# Patient Record
Sex: FEMALE | Race: WHITE | NOT HISPANIC OR LATINO | Employment: OTHER | URBAN - METROPOLITAN AREA
[De-identification: names, ages, dates, MRNs, and addresses within clinical notes are randomized per-mention and may not be internally consistent; named-entity substitution may affect disease eponyms.]

---

## 2023-04-01 ENCOUNTER — APPOINTMENT (OUTPATIENT)
Dept: RADIOLOGY | Facility: MEDICAL CENTER | Age: 33
End: 2023-04-01
Attending: EMERGENCY MEDICINE
Payer: MEDICAID

## 2023-04-01 ENCOUNTER — HOSPITAL ENCOUNTER (EMERGENCY)
Facility: MEDICAL CENTER | Age: 33
End: 2023-04-01
Attending: EMERGENCY MEDICINE
Payer: MEDICAID

## 2023-04-01 VITALS
WEIGHT: 115 LBS | DIASTOLIC BLOOD PRESSURE: 88 MMHG | TEMPERATURE: 99 F | SYSTOLIC BLOOD PRESSURE: 136 MMHG | OXYGEN SATURATION: 100 % | HEART RATE: 90 BPM | RESPIRATION RATE: 15 BRPM

## 2023-04-01 DIAGNOSIS — H53.9 VISUAL DISTURBANCE: ICD-10-CM

## 2023-04-01 DIAGNOSIS — R20.0 NUMBNESS: ICD-10-CM

## 2023-04-01 LAB
ABO GROUP BLD: NORMAL
ALBUMIN SERPL BCP-MCNC: 4.3 G/DL (ref 3.2–4.9)
ALBUMIN/GLOB SERPL: 1.3 G/DL
ALP SERPL-CCNC: 41 U/L (ref 30–99)
ALT SERPL-CCNC: 13 U/L (ref 2–50)
ANION GAP SERPL CALC-SCNC: 15 MMOL/L (ref 7–16)
APTT PPP: 25.5 SEC (ref 24.7–36)
AST SERPL-CCNC: 19 U/L (ref 12–45)
BASOPHILS # BLD AUTO: 0.7 % (ref 0–1.8)
BASOPHILS # BLD: 0.07 K/UL (ref 0–0.12)
BILIRUB SERPL-MCNC: 0.3 MG/DL (ref 0.1–1.5)
BLD GP AB SCN SERPL QL: NORMAL
BUN SERPL-MCNC: 10 MG/DL (ref 8–22)
CALCIUM ALBUM COR SERPL-MCNC: 9.1 MG/DL (ref 8.5–10.5)
CALCIUM SERPL-MCNC: 9.3 MG/DL (ref 8.5–10.5)
CHLORIDE SERPL-SCNC: 105 MMOL/L (ref 96–112)
CO2 SERPL-SCNC: 20 MMOL/L (ref 20–33)
CREAT SERPL-MCNC: 0.71 MG/DL (ref 0.5–1.4)
EKG IMPRESSION: NORMAL
EOSINOPHIL # BLD AUTO: 0.12 K/UL (ref 0–0.51)
EOSINOPHIL NFR BLD: 1.2 % (ref 0–6.9)
ERYTHROCYTE [DISTWIDTH] IN BLOOD BY AUTOMATED COUNT: 43.3 FL (ref 35.9–50)
GFR SERPLBLD CREATININE-BSD FMLA CKD-EPI: 113 ML/MIN/1.73 M 2
GLOBULIN SER CALC-MCNC: 3.4 G/DL (ref 1.9–3.5)
GLUCOSE SERPL-MCNC: 87 MG/DL (ref 65–99)
HCG SERPL QL: NEGATIVE
HCT VFR BLD AUTO: 43.3 % (ref 37–47)
HGB BLD-MCNC: 14.8 G/DL (ref 12–16)
IMM GRANULOCYTES # BLD AUTO: 0.03 K/UL (ref 0–0.11)
IMM GRANULOCYTES NFR BLD AUTO: 0.3 % (ref 0–0.9)
INR PPP: 1 (ref 0.87–1.13)
LYMPHOCYTES # BLD AUTO: 2.28 K/UL (ref 1–4.8)
LYMPHOCYTES NFR BLD: 23.4 % (ref 22–41)
MCH RBC QN AUTO: 31.9 PG (ref 27–33)
MCHC RBC AUTO-ENTMCNC: 34.2 G/DL (ref 33.6–35)
MCV RBC AUTO: 93.3 FL (ref 81.4–97.8)
MONOCYTES # BLD AUTO: 0.51 K/UL (ref 0–0.85)
MONOCYTES NFR BLD AUTO: 5.2 % (ref 0–13.4)
NEUTROPHILS # BLD AUTO: 6.74 K/UL (ref 2–7.15)
NEUTROPHILS NFR BLD: 69.2 % (ref 44–72)
NRBC # BLD AUTO: 0 K/UL
NRBC BLD-RTO: 0 /100 WBC
PLATELET # BLD AUTO: 236 K/UL (ref 164–446)
PMV BLD AUTO: 11.2 FL (ref 9–12.9)
POTASSIUM SERPL-SCNC: 3.9 MMOL/L (ref 3.6–5.5)
PROT SERPL-MCNC: 7.7 G/DL (ref 6–8.2)
PROTHROMBIN TIME: 13.1 SEC (ref 12–14.6)
RBC # BLD AUTO: 4.64 M/UL (ref 4.2–5.4)
RH BLD: NORMAL
SODIUM SERPL-SCNC: 140 MMOL/L (ref 135–145)
TROPONIN T SERPL-MCNC: <6 NG/L (ref 6–19)
WBC # BLD AUTO: 9.8 K/UL (ref 4.8–10.8)

## 2023-04-01 PROCEDURE — 86900 BLOOD TYPING SEROLOGIC ABO: CPT

## 2023-04-01 PROCEDURE — 700105 HCHG RX REV CODE 258: Performed by: EMERGENCY MEDICINE

## 2023-04-01 PROCEDURE — 85730 THROMBOPLASTIN TIME PARTIAL: CPT

## 2023-04-01 PROCEDURE — 99285 EMERGENCY DEPT VISIT HI MDM: CPT

## 2023-04-01 PROCEDURE — 71045 X-RAY EXAM CHEST 1 VIEW: CPT

## 2023-04-01 PROCEDURE — 84703 CHORIONIC GONADOTROPIN ASSAY: CPT

## 2023-04-01 PROCEDURE — 84484 ASSAY OF TROPONIN QUANT: CPT

## 2023-04-01 PROCEDURE — 86901 BLOOD TYPING SEROLOGIC RH(D): CPT

## 2023-04-01 PROCEDURE — 70496 CT ANGIOGRAPHY HEAD: CPT

## 2023-04-01 PROCEDURE — 36415 COLL VENOUS BLD VENIPUNCTURE: CPT

## 2023-04-01 PROCEDURE — 0042T CT-CEREBRAL PERFUSION ANALYSIS: CPT

## 2023-04-01 PROCEDURE — 80053 COMPREHEN METABOLIC PANEL: CPT

## 2023-04-01 PROCEDURE — 85025 COMPLETE CBC W/AUTO DIFF WBC: CPT

## 2023-04-01 PROCEDURE — 85610 PROTHROMBIN TIME: CPT

## 2023-04-01 PROCEDURE — 94760 N-INVAS EAR/PLS OXIMETRY 1: CPT

## 2023-04-01 PROCEDURE — 700111 HCHG RX REV CODE 636 W/ 250 OVERRIDE (IP): Performed by: EMERGENCY MEDICINE

## 2023-04-01 PROCEDURE — 96365 THER/PROPH/DIAG IV INF INIT: CPT | Mod: XU

## 2023-04-01 PROCEDURE — 70450 CT HEAD/BRAIN W/O DYE: CPT

## 2023-04-01 PROCEDURE — 70498 CT ANGIOGRAPHY NECK: CPT

## 2023-04-01 PROCEDURE — 96366 THER/PROPH/DIAG IV INF ADDON: CPT

## 2023-04-01 PROCEDURE — 700117 HCHG RX CONTRAST REV CODE 255

## 2023-04-01 PROCEDURE — 96375 TX/PRO/DX INJ NEW DRUG ADDON: CPT | Mod: XU

## 2023-04-01 PROCEDURE — 86850 RBC ANTIBODY SCREEN: CPT

## 2023-04-01 PROCEDURE — 93005 ELECTROCARDIOGRAM TRACING: CPT

## 2023-04-01 RX ORDER — KETOROLAC TROMETHAMINE 30 MG/ML
15 INJECTION, SOLUTION INTRAMUSCULAR; INTRAVENOUS ONCE
Status: COMPLETED | OUTPATIENT
Start: 2023-04-01 | End: 2023-04-01

## 2023-04-01 RX ORDER — MAGNESIUM SULFATE HEPTAHYDRATE 40 MG/ML
2 INJECTION, SOLUTION INTRAVENOUS ONCE
Status: COMPLETED | OUTPATIENT
Start: 2023-04-01 | End: 2023-04-01

## 2023-04-01 RX ORDER — SODIUM CHLORIDE 9 MG/ML
1000 INJECTION, SOLUTION INTRAVENOUS ONCE
Status: COMPLETED | OUTPATIENT
Start: 2023-04-01 | End: 2023-04-01

## 2023-04-01 RX ADMIN — IOHEXOL 80 ML: 350 INJECTION, SOLUTION INTRAVENOUS at 16:47

## 2023-04-01 RX ADMIN — IOHEXOL 40 ML: 350 INJECTION, SOLUTION INTRAVENOUS at 16:40

## 2023-04-01 RX ADMIN — MAGNESIUM SULFATE HEPTAHYDRATE 2 G: 40 INJECTION, SOLUTION INTRAVENOUS at 17:37

## 2023-04-01 RX ADMIN — KETOROLAC TROMETHAMINE 15 MG: 30 INJECTION, SOLUTION INTRAMUSCULAR at 19:00

## 2023-04-01 RX ADMIN — SODIUM CHLORIDE 1000 ML: 9 INJECTION, SOLUTION INTRAVENOUS at 17:32

## 2023-04-01 NOTE — ED TRIAGE NOTES
Pt to triage .  Chief Complaint   Patient presents with    Visual Problems     Visual changes right eye     Numbness     Pt c/o numbness to bilateral hands     Other     Difficulty speaking since 2 pm

## 2023-04-01 NOTE — ED PROVIDER NOTES
ER Provider Note    Scribed for Shira Ma M.d. by Johnathan Myrick. 4/1/2023  4:02 PM    Primary Care Provider: No primary care provider noted.    CHIEF COMPLAINT  Chief Complaint   Patient presents with    Visual Problems     Visual changes right eye     Numbness     Pt c/o numbness to bilateral hands     Other     Difficulty speaking since 2 pm      EXTERNAL RECORDS REVIEWED  Outpatient Notes reviewed outpatient note from January 22.  Reports patient has a history of recurrent UTIs.  Reviewed notes from 2018 from a neurology office in [place].  There is a diagnosis of migraine with aura.  She has a history of myofascial pain as well she did physical therapy for pain in her neck.  She had daily headaches at that time.  She was on magnesium for migraine prophylaxis.  Note from 2017 from neurology.  In this note she was referred to neurology for a transient neurological episode.  The prior November she had crystallize colors in her right eye and then lost half of her vision in her right eye.  Then she had numbness in her arm and tingling in her hand and shoulder.  She had difficulties with her speech at that time as well and was aware of this issue.  Tonight hours after this episode she had significant pounding in her head which lasted multiple hours.  The arm numbness lasted 6 hours.  She had associated neck pain at that time as well.  At that time they were concerned for cervical radiculopathy and referred her to physical therapy.  She did have MRIs of her C-spine and brain at that time that showed some minimal central disc protrusion without stenosis at C4 5-6 7.  MRI of the brain showed sinusitis otherwise normal.  EMG was normal.    HPI/ROS  LIMITATION TO HISTORY   Select: : None  OUTSIDE HISTORIAN(S):  Significant other boyfriend at bedside to confirm sequence of events and collateral information as detailed above    Aminata Lorenzana is a 33 y.o. female with a history of stroke in 2017 who presents to the ED  as a Stroke Assessment complaining of for evaluation of resolved right sided visual changes onset 1400 today. The patient states she also has bilateral hand numbness, left sided neck pain, left sided jaw pain, nausea, and difficulty speaking, but denies any vomiting, or changes in sensation to her legs. She notes that her symptoms began with seeing black in her right eye. Her boyfriend attempted to assess her pupils, and notes that her right eye was unresponsive to light for a short time. She was slow to respond to questions of orientation, but was able to answer all of the questions. Her visual changes have now resolved. She states that her previous stroke, which occurred in [place], affected her left side. She is unsure if her left sided symptoms are new or chronic. She has a distant history of migraines, but has not had any for a few years. Per nursing, she had equal  strength in triage, but was brought back as a code stroke due to her visual changes and difficulty speaking. No alleviating or exacerbating factors were noted. Her only daily medications include birth control. She has no known drug allergies. She denies any recent illness.    PAST MEDICAL HISTORY  Past Medical History:   Diagnosis Date    Stroke (HCC)      SURGICAL HISTORY  History reviewed. No pertinent surgical history.    FAMILY HISTORY  History reviewed. No pertinent family history.    SOCIAL HISTORY   reports that she has never smoked. She has never used smokeless tobacco. She reports current alcohol use. She reports that she does not use drugs.    CURRENT MEDICATIONS  No current outpatient medications    ALLERGIES  Patient has no known allergies.    PHYSICAL EXAM  BP (!) 142/103   Pulse (!) 104   Temp 36.8 °C (98.2 °F) (Temporal)   Resp 16   Wt 52.2 kg (115 lb)   LMP 01/27/2023 (Approximate) Comment: irregular due to birth control  SpO2 100%   Constitutional: Alert in no apparent distress.  HENT: No signs of trauma, Bilateral  external ears normal, Nose normal.   Eyes: Pupils are equal and reactive, Conjunctiva normal, Non-icteric.   Neck:  No stridor.   Cardiovascular: Regular rate and rhythm, no murmurs.   Thorax & Lungs: Normal breath sounds, No respiratory distress, No wheezing, No chest tenderness.   Abdomen: Bowel sounds normal, Soft, No tenderness, No masses, No peritoneal signs.  Skin: Warm, Dry, No erythema, No rash.   Musculoskeletal:  No major deformities noted.   Neurologic: Alert, cranial nerves intact strength in all 4 extremities intact patient reports altered sensation of her bilateral hands.  Her NIH stroke scale is 1.    DIAGNOSTIC STUDIES    Labs:   Results for orders placed or performed during the hospital encounter of 04/01/23   CBC WITH DIFFERENTIAL   Result Value Ref Range    WBC 9.8 4.8 - 10.8 K/uL    RBC 4.64 4.20 - 5.40 M/uL    Hemoglobin 14.8 12.0 - 16.0 g/dL    Hematocrit 43.3 37.0 - 47.0 %    MCV 93.3 81.4 - 97.8 fL    MCH 31.9 27.0 - 33.0 pg    MCHC 34.2 33.6 - 35.0 g/dL    RDW 43.3 35.9 - 50.0 fL    Platelet Count 236 164 - 446 K/uL    MPV 11.2 9.0 - 12.9 fL    Neutrophils-Polys 69.20 44.00 - 72.00 %    Lymphocytes 23.40 22.00 - 41.00 %    Monocytes 5.20 0.00 - 13.40 %    Eosinophils 1.20 0.00 - 6.90 %    Basophils 0.70 0.00 - 1.80 %    Immature Granulocytes 0.30 0.00 - 0.90 %    Nucleated RBC 0.00 /100 WBC    Neutrophils (Absolute) 6.74 2.00 - 7.15 K/uL    Lymphs (Absolute) 2.28 1.00 - 4.80 K/uL    Monos (Absolute) 0.51 0.00 - 0.85 K/uL    Eos (Absolute) 0.12 0.00 - 0.51 K/uL    Baso (Absolute) 0.07 0.00 - 0.12 K/uL    Immature Granulocytes (abs) 0.03 0.00 - 0.11 K/uL    NRBC (Absolute) 0.00 K/uL   COMP METABOLIC PANEL   Result Value Ref Range    Sodium 140 135 - 145 mmol/L    Potassium 3.9 3.6 - 5.5 mmol/L    Chloride 105 96 - 112 mmol/L    Co2 20 20 - 33 mmol/L    Anion Gap 15.0 7.0 - 16.0    Glucose 87 65 - 99 mg/dL    Bun 10 8 - 22 mg/dL    Creatinine 0.71 0.50 - 1.40 mg/dL    Calcium 9.3 8.5 - 10.5  mg/dL    AST(SGOT) 19 12 - 45 U/L    ALT(SGPT) 13 2 - 50 U/L    Alkaline Phosphatase 41 30 - 99 U/L    Total Bilirubin 0.3 0.1 - 1.5 mg/dL    Albumin 4.3 3.2 - 4.9 g/dL    Total Protein 7.7 6.0 - 8.2 g/dL    Globulin 3.4 1.9 - 3.5 g/dL    A-G Ratio 1.3 g/dL   PROTHROMBIN TIME   Result Value Ref Range    PT 13.1 12.0 - 14.6 sec    INR 1.00 0.87 - 1.13   APTT   Result Value Ref Range    APTT 25.5 24.7 - 36.0 sec   COD (ADULT)   Result Value Ref Range    ABO Grouping Only A     Rh Grouping Only NEG     Antibody Screen-Cod NEG    TROPONIN   Result Value Ref Range    Troponin T <6 6 - 19 ng/L   CORRECTED CALCIUM   Result Value Ref Range    Correct Calcium 9.1 8.5 - 10.5 mg/dL   ESTIMATED GFR   Result Value Ref Range    GFR (CKD-EPI) 113 >60 mL/min/1.73 m 2   BETA-HCG QUALITATIVE SERUM   Result Value Ref Range    Beta-Hcg Qualitative Serum Negative Negative   EKG (NOW)   Result Value Ref Range    Report       [facility] Emergency Dept.    Test Date:  2023  Pt Name:    KIRBY PIERRE                 Department: ER  MRN:        [id number]                      Room:        07  Gender:     Female                       Technician: [id number]  :        1990                   Requested By:JUVENTINO HANKINS  Order #:    [id number]                    Reading MD: JUVENTINO HANKINS    Measurements  Intervals                                Axis  Rate:       86                           P:          12  AR:         113                          QRS:        71  QRSD:       68                           T:          43  QT:         375  QTc:        449    Interpretive Statements  Sinus rhythm  Borderline short AR interval  No previous ECG available for comparison  Impression: Sinus rhythm without evidence of ischemia.  Electronically Signed On 2023 17:48:42 PDT by JUVENTINO HANKINS         EKG:   I have independently interpreted this EKG as detailed above.      Radiology:   The attending emergency physician has  independently interpreted the diagnostic imaging associated with this visit and am waiting the final reading from the radiologist.   Preliminary interpretation is a follows: CT-Head appears within normal limits.  Radiologist interpretation:   DX-CHEST-PORTABLE (1 VIEW)   Final Result      1.  There is no acute cardiopulmonary process.      CT-CTA NECK WITH & W/O-POST PROCESSING   Final Result      1.  Normal CTA of the neck.   2.  Incidental 3 mm right lobe thyroid gland nodule. Due to the small subcentimeter size no follow-up imaging is indicated per ACR guidelines.      CT-CTA HEAD WITH & W/O-POST PROCESS   Final Result      CT angiogram of the [place] within normal limits.      CT-CEREBRAL PERFUSION ANALYSIS   Final Result      1.  Cerebral blood flow less than 30% likely representing completed infarct = 0 mL.      2.  T Max more than 6 seconds likely representing combination of completed infarct and ischemia = 0 mL.      3.  Mismatched volume likely representing ischemic brain/penumbra = 0 mL.      Please note that the cerebral perfusion was performed on the limited brain tissue around the basal ganglia region. Infarct/ischemia outside the CT perfusion sections can be missed in this study.      CT-HEAD W/O   Final Result      1.  Head CT without contrast within normal limits. No evidence of acute cerebral infarction, hemorrhage or mass lesion.           COURSE & MEDICAL DECISION MAKING     4:07 PM - Patient was evaluated at the charge desk. Ordered for Dx-Chest, CT-Head w/o, Ct-Cerebral perfusion Analysis, CT-CTA Head w/ & w/o post process, CT-CTA Neck w/ & w/o post process, CBC w/diff, CMP, PTT,  APTT, COD, Troponin, and EKG to evaluate. Patient verbalizes understanding and support with my plan of care.     ED Observation Status? Yes; I am placing the patient in to an observation status due to a diagnostic uncertainty as well as therapeutic intensity. Patient placed in observation status at 4:07 PM,  4/1/2023.     Observation plan is as follows: Imaging, interventions reassessment.    Upon Reevaluation, the patient's condition has: Improved; and will be discharged.    Patient discharged from ED Observation status at 5:30 PM (Time) 4/1/2023 (Date).     INITIAL ASSESSMENT, COURSE AND PLAN  Care Narrative: This is a 33-year-old female that presents for loss of vision of her right eye and altered sensation in bilateral hands.  Differential includes but is not limited to complex migraine, demyelinating disease less likely stroke.  CTA of the head neck will be performed given she has neck pain to evaluate for any possible vertebral artery dissection or abnormality.    4:41 PM - The patient will be medicated with Magnesium Sulfate IVPB 2 g, Toradol 15 mg injection, and NS infusion 1000 mL for her symptoms.    5:06 PM - Preliminary review of CT-Head performed by myself at this time as detailed above.    5:30 PM - Patient was reevaluated at bedside. She states that she is feeling improved, her symptoms have significantly improved her speech is much better, but still has a mild headache. Informed the patient that I was able to access her records from [place]. Discussed lab and radiology results with the patient and informed them that her CT shows no acute evidence of a stroke, and given her current symptomatic presentation there is no indication for MRI. I discussed plan for discharge and follow up as outlined below. The patient is stable for discharge at this time and will return for any new or worsening symptoms. Patient verbalizes understanding and support with my plan for discharge.      BP (!) 157/94   Pulse 94   Temp 36.8 °C (98.2 °F) (Temporal)   Resp 16   Wt 52.2 kg (115 lb)   LMP 01/27/2023 (Approximate) Comment: irregular due to birth control  SpO2 99%       HYDRATION: Based on the patient's presentation of Dehydration and Other headache the patient was given IV fluids. IV Hydration was used because oral  hydration was not adequate alone. Upon recheck following hydration, the patient was improved.         DISPOSITION AND DISCUSSIONS    Labs are obtained she has a normal CMP, normal LFTs, normal CBC without evidence of anemia or infection.  Chest x-ray does not show any evidence of cardiopulmonary process.  CTA of the head and neck did not show any evidence of large vessel occlusion.  On reevaluation her symptoms had essentially disappeared except for now having a headache.  I do think that her symptoms are more consistent with a complex migraine.  She was treated for this with some pain medication and magnesium.  She was improved even before these interventions from a neurologic standpoint and therefore I do think she can be discharged.  I did note patient to be ambulating out of the department on her own without difficulty.  I did refer patient to outpatient primary care as she does not have one established.  She is agreeable to this plan.    I have discussed management of the patient with the following physicians and JIGNESH's:  None    Discussion of management with other QHP or appropriate source(s): None     Escalation of care considered, and ultimately not performed: acute inpatient care management, however at this time, the patient is most appropriate for outpatient management.    Barriers to care at this time, including but not limited to: Patient does not have established PCP.     Decision tools and prescription drugs considered including, but not limited to: NIH Stroke Scale 1 .    The patient will return for new or worsening symptoms and is stable at the time of discharge. Patient was given return precautions. Patient and/or family member verbalizes understanding and will comply. I reviewed the [place] Prescription Monitoring Program, and the patient had no records available.     DISPOSITION:  Patient will be discharged home in stable condition.    FOLLOW UP:  [facility], Emergency Dept  [id number]  [facility] [address]  [phone]    Return to the emergency department for worsening or recurrent symptoms, severe headaches, persistent vomiting or other concerns.    FINAL DIANGOSIS  1. Numbness    2. Visual disturbance         IJohnathan (Scribe), am scribing for, and in the presence of, Shira Ma M.D..    Electronically signed by: Johnathan Myrick (Raizaiblashanda), 4/1/2023    IShira M.D. personally performed the services described in this documentation, as scribed by Johnathan Myrick in my presence, and it is both accurate and complete.     The note accurately reflects work and decisions made by me.  Shira Ma M.D.  4/1/2023  10:29 PM

## 2023-04-25 ENCOUNTER — OFFICE VISIT (OUTPATIENT)
Dept: MEDICAL GROUP | Facility: MEDICAL CENTER | Age: 33
End: 2023-04-25
Attending: NURSE PRACTITIONER
Payer: MEDICAID

## 2023-04-25 VITALS
OXYGEN SATURATION: 98 % | DIASTOLIC BLOOD PRESSURE: 78 MMHG | HEART RATE: 91 BPM | WEIGHT: 117.5 LBS | BODY MASS INDEX: 17.81 KG/M2 | RESPIRATION RATE: 18 BRPM | TEMPERATURE: 97.8 F | HEIGHT: 68 IN | SYSTOLIC BLOOD PRESSURE: 104 MMHG

## 2023-04-25 DIAGNOSIS — Z76.89 ENCOUNTER TO ESTABLISH CARE: ICD-10-CM

## 2023-04-25 DIAGNOSIS — G43.909 MIGRAINE WITHOUT STATUS MIGRAINOSUS, NOT INTRACTABLE, UNSPECIFIED MIGRAINE TYPE: ICD-10-CM

## 2023-04-25 DIAGNOSIS — M54.2 NECK PAIN: ICD-10-CM

## 2023-04-25 DIAGNOSIS — Z01.419 WELL WOMAN EXAM: ICD-10-CM

## 2023-04-25 DIAGNOSIS — R39.9 UTI SYMPTOMS: ICD-10-CM

## 2023-04-25 LAB
APPEARANCE UR: YELLOW
BILIRUB UR STRIP-MCNC: NORMAL MG/DL
COLOR UR AUTO: NORMAL
GLUCOSE UR STRIP.AUTO-MCNC: NORMAL MG/DL
KETONES UR STRIP.AUTO-MCNC: NORMAL MG/DL
LEUKOCYTE ESTERASE UR QL STRIP.AUTO: NORMAL
NITRITE UR QL STRIP.AUTO: NORMAL
PH UR STRIP.AUTO: 5 [PH] (ref 5–8)
PROT UR QL STRIP: NORMAL MG/DL
RBC UR QL AUTO: NORMAL
SP GR UR STRIP.AUTO: 1.03
UROBILINOGEN UR STRIP-MCNC: 0.2 MG/DL

## 2023-04-25 PROCEDURE — 99214 OFFICE O/P EST MOD 30 MIN: CPT | Performed by: NURSE PRACTITIONER

## 2023-04-25 PROCEDURE — 99204 OFFICE O/P NEW MOD 45 MIN: CPT | Performed by: NURSE PRACTITIONER

## 2023-04-25 PROCEDURE — 81002 URINALYSIS NONAUTO W/O SCOPE: CPT | Performed by: NURSE PRACTITIONER

## 2023-04-25 RX ORDER — SUMATRIPTAN 50 MG/1
50 TABLET, FILM COATED ORAL
Qty: 10 TABLET | Refills: 3 | Status: SHIPPED | OUTPATIENT
Start: 2023-04-25

## 2023-04-25 ASSESSMENT — PATIENT HEALTH QUESTIONNAIRE - PHQ9: CLINICAL INTERPRETATION OF PHQ2 SCORE: 0

## 2023-04-25 ASSESSMENT — FIBROSIS 4 INDEX: FIB4 SCORE: 0.78

## 2023-04-25 NOTE — LETTER
[facility]  TRISH Galan.  [address]  [place] [address]  Fax: [phone]   Authorization for Release/Disclosure of   Protected Health Information   Name: KIRBY PIERRE : 1990 SSN: [id number]   Address: [facility] [place] [address] Phone:    [phone] (home)    I authorize the entity listed below to release/disclose the PHI below to:   [facility]/ALIYA Galan and ALIYA Galan   Provider or Entity Name:  [facility]   Address   City, [facility], [place], [place]  Phone:      Fax:     Reason for request: continuity of care   Information to be released:    [  ] LAST COLONOSCOPY,  including any PATH REPORT and follow-up  [  ] LAST FIT/COLOGUARD RESULT [  ] LAST DEXA  [  ] LAST MAMMOGRAM  [  ] LAST PAP  [  ] LAST LABS [  ] RETINA EXAM REPORT  [  ] IMMUNIZATION RECORDS  [  xx] Release all info      [  ] Check here and initial the line next to each item to release ALL health information INCLUDING  _____ Care and treatment for drug and / or alcohol abuse  _____ HIV testing, infection status, or AIDS  _____ Genetic Testing    DATES OF SERVICE OR TIME PERIOD TO BE DISCLOSED: _____________  I understand and acknowledge that:  * This Authorization may be revoked at any time by you in writing, except if your health information has already been used or disclosed.  * Your health information that will be used or disclosed as a result of you signing this authorization could be re-disclosed by the recipient. If this occurs, your re-disclosed health information may no longer be protected by State or Federal laws.  * You may refuse to sign this Authorization. Your refusal will not affect your ability to obtain treatment.  * This Authorization becomes effective upon signing and will  on (date) __________.      If no date is indicated, this Authorization will  one (1) year from the signature date.    Name: Kirby Pierre  Signature: Date:   2023      PLEASE FAX REQUESTED RECORDS BACK TO: [phone]

## 2023-04-26 PROBLEM — G43.909 MIGRAINE WITHOUT STATUS MIGRAINOSUS, NOT INTRACTABLE: Status: ACTIVE | Noted: 2023-04-26

## 2023-04-26 PROBLEM — Z76.89 ENCOUNTER TO ESTABLISH CARE: Status: ACTIVE | Noted: 2023-04-26

## 2023-04-26 PROBLEM — M54.2 NECK PAIN: Status: ACTIVE | Noted: 2023-04-26

## 2023-04-26 PROBLEM — R39.9 UTI SYMPTOMS: Status: ACTIVE | Noted: 2023-04-26

## 2023-04-26 NOTE — PROGRESS NOTES
No chief complaint on file.      Subjective:     HPI:   Aminata Lorenzana is a 33 y.o. female here to discuss the evaluation and management of:        Problem   Neck Pain    Patient states she has a history of her neck condition that makes her neck a little unstable at times and gets hard to hold her head up.  Patient would like to go to physical therapy to see exercises to strengthen the area     Uti Symptoms    Patient states she has longstanding history of UTIs and kidney infections.  Patient states she can usually feel when it is coming on and did have a little bit of burning sensation with urination so she would like to see if a UTI is showing in her urine.     Encounter to Establish Care    Patient here to establish care.  Patient states she was previously seen in [place] for primary care and that all of her records are there.  Patient states that she has been diagnosed with some neck instability and had previously gone to physical therapy for this.  She also has migraines, and frequent UTIs.     Migraine Without Status Migrainosus, Not Intractable    Patient states she has history of migraines and has had a stroke previously.  Patient states she has never been tried on anything for migraine  however she has tried Topamax in the past for preventative measures and did not tolerate this medication.  She did see a neurologist at the time of her stroke but has not been followed by neurologist for headaches.         ROS  See HPI       No Known Allergies    Current medicines (including changes today)  Current Outpatient Medications   Medication Sig Dispense Refill    SUMAtriptan (IMITREX) 50 MG Tab Take 1 Tablet by mouth one time as needed for Migraine for up to 1 dose. May repeat dose one time after 2 hours 10 Tablet 3     No current facility-administered medications for this visit.       Social History     Tobacco Use    Smoking status: Never    Smokeless tobacco: Never   Vaping Use    Vaping Use:  "Never used   Substance Use Topics    Alcohol use: Yes     Comment: [email or web address]    Drug use: Yes     Types: Marijuana     Comment: ocass       Patient Active Problem List    Diagnosis Date Noted    Neck pain 04/26/2023    UTI symptoms 04/26/2023    Encounter to establish care 04/26/2023    Migraine without status migrainosus, not intractable 04/26/2023       Family History   Problem Relation Age of Onset    Diabetes Maternal Grandmother     Diabetes Paternal Grandfather           Objective:     /78 (BP Location: Right arm, Patient Position: Sitting, BP Cuff Size: Adult)   Pulse 91   Temp 36.6 °C (97.8 °F) (Temporal)   Resp 18   Ht 1.727 m (5' 8\")   Wt 53.3 kg (117 lb 8 oz)   SpO2 98%  Body mass index is 17.87 kg/m².    Physical Exam:  Physical Exam  Vitals reviewed.   Constitutional:       General: She is awake.      Appearance: Normal appearance. She is well-developed.   HENT:      Head: Normocephalic.   Eyes:      Conjunctiva/sclera: Conjunctivae normal.   Cardiovascular:      Rate and Rhythm: Normal rate and regular rhythm.      Heart sounds: Normal heart sounds.   Pulmonary:      Effort: Pulmonary effort is normal. No respiratory distress.      Breath sounds: Normal breath sounds. No wheezing.   Musculoskeletal:      Cervical back: Neck supple.   Skin:     General: Skin is warm and dry.   Neurological:      Mental Status: She is alert and oriented to person, place, and time.   Psychiatric:         Mood and Affect: Mood normal.         Behavior: Behavior normal. Behavior is cooperative.       Assessment and Plan:     The following treatment plan was discussed:    Problem List Items Addressed This Visit       Neck pain     Ongoing-  Referral to physical therapy placed to help with neck strengthening exercises         Relevant Orders    Referral to Physical Therapy    UTI symptoms     Ongoing-  POCT urinalysis completed in clinic and was negative for infection  Encourage patient to continue to drink extra " water and stay well-hydrated  We will follow-up with me for any changes.         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  Patient would like to discuss her options with an OB/GYN and if possible get a second confirmation that this is not a condition that will allow her to have children and then proceed with possible hysterectomy.  Preventative screening labs will be completed when patient follows up with me in 6 months  Referral to OB/GYN placed to discuss diagnosis and possible intervention/birth control options  Records release signed for previous records from [place]           Migraine without status migrainosus, not intractable     Ongoing-  Patient described her headaches/migraines being accompanied with an aura and would like to try some abortive medications  Sumatriptan prescribed for patient and instructions provided for use  Patient will follow back up with me if this is not effective we will consider referral to neurology headache clinic  Encourage patient to avoid any triggers for her migraines         Relevant Medications    SUMAtriptan (IMITREX) 50 MG Tab     Other Visit Diagnoses       Well woman exam        Relevant Orders    Referral to OB/Gyn            Any change or worsening of signs or symptoms, patient encouraged to follow-up or report to emergency room for further evaluation. Patient verbalizes understanding and agrees.    Follow-Up: Follow-up with me in 6 months      PLEASE NOTE: This dictation was created using voice recognition software. I have made every reasonable attempt to correct obvious errors, but I expect that there are errors of grammar and  possibly content that I did not discover before finalizing the note.

## 2023-04-26 NOTE — ASSESSMENT & PLAN NOTE
Ongoing-  POCT urinalysis completed in clinic and was negative for infection  Encourage patient to continue to drink extra water and stay well-hydrated  We will follow-up with me for any changes.

## 2023-04-26 NOTE — ASSESSMENT & PLAN NOTE
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  Patient would like to discuss her options with an OB/GYN and if possible get a second confirmation that this is not a condition that will allow her to have children and then proceed with possible hysterectomy.  Preventative screening labs will be completed when patient follows up with me in 6 months  Referral to OB/GYN placed to discuss diagnosis and possible intervention/birth control options  Records release signed for previous records from [place]

## 2023-04-26 NOTE — ASSESSMENT & PLAN NOTE
Ongoing-  Patient described her headaches/migraines being accompanied with an aura and would like to try some abortive medications  Sumatriptan prescribed for patient and instructions provided for use  Patient will follow back up with me if this is not effective we will consider referral to neurology headache clinic  Encourage patient to avoid any triggers for her migraines

## 2023-05-03 ENCOUNTER — PHYSICAL THERAPY (OUTPATIENT)
Dept: PHYSICAL THERAPY | Facility: MEDICAL CENTER | Age: 33
End: 2023-05-03
Attending: NURSE PRACTITIONER
Payer: MEDICAID

## 2023-05-03 DIAGNOSIS — M54.2 NECK PAIN: ICD-10-CM

## 2023-05-03 PROCEDURE — 97163 PT EVAL HIGH COMPLEX 45 MIN: CPT

## 2023-05-03 ASSESSMENT — ENCOUNTER SYMPTOMS
PAIN TIMING: ALL DAY
QUALITY: SHOOTING
PAIN SCALE AT HIGHEST: 10
ALLEVIATING FACTORS: MASSAGE
PAIN SCALE AT LOWEST: 5
QUALITY: ACHING
PAIN SCALE: 7
MIGRAINE HEADACHES: 1
EXACERBATED BY: LIFTING
ALLEVIATING FACTORS: HEATING PAD
QUALITY: DULL ACHE
EXACERBATED BY: SITTING
QUALITY: KNIFE-LIKE
QUALITY: SHARP
QUALITY: PRESSURE

## 2023-05-03 NOTE — OP THERAPY EVALUATION
"  Outpatient Physical Therapy  INITIAL EVALUATION    [facility] Outpatient Physical Therapy  [address]  [place] [address]  Phone:  [phone]  Fax:  [phone]    Date of Evaluation: 2023    Patient: Rashmi Lorenzana  YOB: 1990  MRN: [id number]     Referring Provider: ALIYA Galan  [address] [address]  [place] [address]   Referring Diagnosis Neck pain [M54.2]     Time Calculation                 Chief Complaint: No chief complaint on file.    Visit Diagnoses     ICD-10-CM   1. Neck pain  M54.2       Date of onset of impairment: No data found    Subjective:   History of Present Illness:     Date of onset:  5/3/1994    Mechanism of injury:  Pt reports she went to the ED on the  which she reports having had migraines ever since the . Pt reports neck issues her whole life. She reports her \"ribs going out of place and feels like it is hitting her heart\". Pt reports neck pain for the vast majority of her life. Pt reports she has a \"reverse curvature of her spine\" and other curvature of her spine. Pt reports jaw pain and hearing issues (fullness of the ear). Pt reports long standing hx of aura migraines. Pt reports an electric pain from her finger to her heart.   Headaches:  migraine headaches  Pain:     Current pain ratin    At best pain ratin    At worst pain rating:  10    Quality:  Aching, knife-like, sharp, pressure, dull ache and shooting    Pain timing:  All day    Relieving factors:  Heating pad and massage    Aggravating factors:  Sitting and lifting    Pain Comments::  Stretching of the lower extremities will increase back and neck pain, specifically putting her legs up against the wall.   Social Support:     Lives in:  Multiple-level home  Hand dominance:  Right  Patient Goals:     Patient goals for therapy:  Decreased pain    Past Medical History:   Diagnosis Date    Stroke (HCC)      No past surgical " "history on file.  Social History     Tobacco Use    Smoking status: Never    Smokeless tobacco: Never   Substance Use Topics    Alcohol use: Yes     Comment: ocass     Family and Occupational History     Socioeconomic History    Marital status: Unknown     Spouse name: Not on file    Number of children: Not on file    Years of education: Not on file    Highest education level: Not on file   Occupational History    Not on file       Objective     Observations   Central spine     Positive for cervical kyphosis.    Neurological Testing     Myotome testing   Cervical (left)   All left cervical myotomes within normal limits    Cervical (right)   All right cervical myotomes within normal limits    General Comments     Spine Comments   DNF test: 11.37          Therapeutic Exercises (CPT 65789):     1. Cervical AROM, Flexion, Ext, SB, Rotation (x10 ea. direction), Within pain free/near pain free limits (HEP)    2. Rows, OTB 2x12, HEP    3. Chin tuck with lift, 10x5\" holds, HEP      Therapeutic Exercise Summary: Pt was educated today regarding current condition, expectations for rehab potential and appropriate exercise program for home. HEP was given to patient in print out form and instructions were communicated to pt.       Access Code: [email or web address]    Exercises  - Seated Cervical Rotation AROM  - 1-2 x daily - 4-5 x weekly - 2 sets - 10-15 reps  - Seated Cervical Extension AROM  - 1-2 x daily - 4-5 x weekly - 2 sets - 10-15 reps  - Seated Cervical Flexion AROM  - 1-2 x daily - 4-5 x weekly - 2 sets - 10-15 reps  - Seated Cervical Sidebending AROM  - 1-2 x daily - 4-5 x weekly - 2 sets - 10-15 reps  - Supine Deep Neck Flexor Training - Repetitions  - 1-2 x daily - 4-5 x weekly - 2 sets - 10-15 reps - 5 hold  - Standing Shoulder Row with Anchored Resistance  - 1-2 x daily - 4-5 x weekly - 2 sets - 15 reps    Time-based treatments/modalities:           Assessment, Response and Plan:   Impairments: hypersensitivity    Assessment " details:  Pt is a 34 y/o F presenting to PT with signs and symptoms consistent with Chronic neck and low back pain. Pt has deficits of hypersensitivity, aura migraines, fear avoidant behavior, and reduced cervical spine deep neck flexor strength and stability. Given pt's age, overall health, current condition and adherence to PT recommendations, anticipated duration of episode is 15 weeks.      Prognosis: fair    Goals:   Short Term Goals:   1. Pt will achieve a 20 or lower on NDI  2. Pt will be able to achieve arom of cervical spine within normal limits in all planes pain free or near pain free (2/10 or lower on numeric pain rating scale)  3. Pt will be able to perform DNF test for 15 seconds or greater  Short term goal time span:  4-6 weeks      Long Term Goals:    1. Pt will achieve a 10 or lower on NDI  2. Pt will be able to perform DNF test within age appropriate normative value or greater  Long term goal time span:  2-4 months    Plan:   Therapy options:  Physical therapy treatment to continue  Planned therapy interventions:  Neuromuscular Re-education (CPT 25740), Therapeutic Activities (CPT 02331) and Therapeutic Exercise (CPT 78779)  Frequency:  2x week  Duration in weeks:  15  Discussed with:  Patient    Functional Assessment Used        Referring provider co-signature:  I have reviewed this plan of care and my co-signature certifies the need for services.    Certification Period: 05/03/2023 to  Other 8/16/23    Physician Signature: ________________________________ Date: ______________

## 2023-05-16 ENCOUNTER — HOSPITAL ENCOUNTER (OUTPATIENT)
Facility: MEDICAL CENTER | Age: 33
End: 2023-05-16
Attending: NURSE PRACTITIONER
Payer: MEDICAID

## 2023-05-16 ENCOUNTER — GYNECOLOGY VISIT (OUTPATIENT)
Dept: OBGYN | Facility: CLINIC | Age: 33
End: 2023-05-16
Payer: MEDICAID

## 2023-05-16 VITALS
DIASTOLIC BLOOD PRESSURE: 68 MMHG | HEIGHT: 68 IN | WEIGHT: 118 LBS | SYSTOLIC BLOOD PRESSURE: 102 MMHG | BODY MASS INDEX: 17.88 KG/M2

## 2023-05-16 DIAGNOSIS — G43.109 MIGRAINE WITH AURA AND WITHOUT STATUS MIGRAINOSUS, NOT INTRACTABLE: ICD-10-CM

## 2023-05-16 DIAGNOSIS — N94.6 PAINFUL MENSTRUAL PERIODS: ICD-10-CM

## 2023-05-16 DIAGNOSIS — Z01.419 WELL WOMAN EXAM WITH ROUTINE GYNECOLOGICAL EXAM: Primary | ICD-10-CM

## 2023-05-16 DIAGNOSIS — N88.9 ABNORMALITY OF UTERINE CERVIX: ICD-10-CM

## 2023-05-16 DIAGNOSIS — N92.0 MENORRHAGIA WITH REGULAR CYCLE: ICD-10-CM

## 2023-05-16 PROBLEM — G43.909 MIGRAINE WITHOUT STATUS MIGRAINOSUS, NOT INTRACTABLE: Status: RESOLVED | Noted: 2023-04-26 | Resolved: 2023-05-16

## 2023-05-16 PROCEDURE — 87491 CHLMYD TRACH DNA AMP PROBE: CPT

## 2023-05-16 PROCEDURE — 87624 HPV HI-RISK TYP POOLED RSLT: CPT

## 2023-05-16 PROCEDURE — 3074F SYST BP LT 130 MM HG: CPT | Performed by: NURSE PRACTITIONER

## 2023-05-16 PROCEDURE — 87591 N.GONORRHOEAE DNA AMP PROB: CPT

## 2023-05-16 PROCEDURE — G0101 CA SCREEN;PELVIC/BREAST EXAM: HCPCS | Performed by: NURSE PRACTITIONER

## 2023-05-16 PROCEDURE — 3078F DIAST BP <80 MM HG: CPT | Performed by: NURSE PRACTITIONER

## 2023-05-16 PROCEDURE — 88175 CYTOPATH C/V AUTO FLUID REDO: CPT

## 2023-05-16 RX ORDER — LEVONORGESTREL AND ETHINYL ESTRADIOL 0.1-0.02MG
1 KIT ORAL DAILY
Qty: 28 TABLET | Refills: 1 | Status: SHIPPED | OUTPATIENT
Start: 2023-05-16 | End: 2023-07-25

## 2023-05-16 RX ORDER — LEVONORGESTREL AND ETHINYL ESTRADIOL 0.1-0.02MG
1 KIT ORAL DAILY
COMMUNITY
End: 2023-05-16 | Stop reason: SDUPTHER

## 2023-05-16 RX ORDER — LEVONORGESTREL AND ETHINYL ESTRADIOL 0.1-0.02MG
1 KIT ORAL
COMMUNITY
Start: 2023-02-07 | End: 2023-07-25

## 2023-05-16 RX ORDER — ACETAMINOPHEN 500 MG
1 TABLET ORAL EVERY EVENING
Status: ON HOLD | COMMUNITY
End: 2024-01-18

## 2023-05-16 ASSESSMENT — FIBROSIS 4 INDEX: FIB4 SCORE: 0.78

## 2023-05-16 NOTE — PROGRESS NOTES
Pt here for new pt appt  Pt states she is experiencing heavy periods and would like to discuss get a hysterectomy  Pharmacy verified  Good #: [phone] (home)    LMP: 4/10  PAP: today  BC: OCP

## 2023-05-16 NOTE — PROGRESS NOTES
"Aminata Lorenzana is a 33 y.o. y.o. female who presents for her Gynecologic Exam        HPI Comments: Pt presents for well woman exam. Pt has complaints related to heavy bleeding with periods and pain. Patient's last menstrual period was 04/10/2023 (exact date).    Rashmi is here today to establish care and discuss heavy painful periods.  She is a G0. Desires children, but was told she couldn't have kids. Unsure of etiology.  She is currently sexually active with 1 male partner. No concerns.  Denies any breast changes, although states she has always had fibrous breasts.  Denies any hx of STI or other infection.  She states she has irregular periods. She uses Aviane for period regulation. States she gets a period about every 2-3 months and that they are heavy and crampy. States she has painful cramping 1 week before her period, then has a 7 day period with heavy bleeding (changing a super tampon every 30 min-1 hour) for the entire cycle. She has painful cramping during her period, and then also has 1 week of painful cramping after bleeding. States that this medication has helped her the most.    States her mother had similar issues and had a partial hysterectomy which she says has helped.  She was told in [place] that she has a \"septate or bicornuate uterus\", but is unsure which one.    She has a history of migraines, possible aura. Takes medication for this. States her BURAK doesn't affect her headaches or migraines at all. She does state that she went to the ED 2 times due to neurologic symptoms, but was told everything was normal.    Review of Systems   Pertinent positives documented in HPI and all other systems reviewed & are negative    All PMH, PSH, allergies, social history and FH reviewed and updated today:  Past Medical History:   Diagnosis Date    Stroke (HCC)      History reviewed. No pertinent surgical history.  [place] (diagnostic), [place] meal, Camphor, and Cinnamon  Social History     Socioeconomic " "History    Marital status: Unknown   Tobacco Use    Smoking status: Never    Smokeless tobacco: Never   Vaping Use    Vaping Use: Never used   Substance and Sexual Activity    Alcohol use: Yes     Comment: ocass    Drug use: Yes     Types: Marijuana     Comment: ocass    Sexual activity: Yes     Partners: Male     Birth control/protection: Pill     Family History   Problem Relation Age of Onset    Stroke Father     Diabetes Maternal Grandmother     Diabetes Paternal Grandfather      Medications:   Current Outpatient Medications Ordered in Epic   Medication Sig Dispense Refill    Probiotic, Lactobacillus, Cap Probiotic      levonorgestrel-ethinyl estradiol (AVIANE) 0.1-20 MG-MCG per tablet Take 1 Tablet by mouth every day.      SUMAtriptan (IMITREX) 50 MG Tab Take 1 Tablet by mouth one time as needed for Migraine for up to 1 dose. May repeat dose one time after 2 hours 10 Tablet 3    levonorgestrel-ethinyl estradiol (LUTERA) 0.1-20 MG-MCG per tablet Take 1 Tablet by mouth every day. (Patient not taking: Reported on 5/16/2023)       No current [facility]-ordered facility-administered medications on file.          Objective:   Vital measurements:  /68   Ht 5' 8\"   Wt 118 lb   Body mass index is 17.94 kg/m². (Goal BM I>18 <25)    Physical Exam     Chaperone offered: yes    Nursing note and vitals reviewed.  Constitutional: She is oriented to person, place, and time. She appears well-developed and well-nourished. No distress.     HEENT:   Head: Normocephalic and atraumatic.   Right Ear: External ear normal.   Left Ear: External ear normal.   Nose: Nose normal.   Eyes: Conjunctivae and EOM are normal. Pupils are equal, round, and reactive to light. No scleral icterus.     Neck: Normal range of motion. Neck supple. No tracheal deviation present. No thyromegaly present.     Pulmonary/Chest: Effort normal and breath sounds normal. No respiratory distress. She has no wheezes. She has no rales. She exhibits no tenderness. "     Cardiovascular: Regular, rate and rhythm. No JVD.    Abdominal: Soft. Bowel sounds are normal. She exhibits no distension and no mass. No tenderness. She has no rebound and no guarding.     Breast:  Symmetrical, normal consistency without masses., No dimpling or skin changes, Normal nipples without discharge, no axillary lymphadenopathy, negative, Inspection negative. No nipple discharge or bleeding, No masses, positive fibrocystic changes    Genitourinary:  Pelvic exam was performed with patient supine.  External genitalia with no abnormal pigmentation, labial fusion,rash, tenderness, lesion or injury to the labia bilaterally.  Vagina is moist with no lesions, foul discharge, erythema, tenderness or bleeding. No foreign body around the vagina or signs of injury.   Cervix exhibits no motion tenderness, no discharge and no friability.   Uterus is slightly ante-verted, not deviated, not enlarged, not fixed and not tender.  Right adnexum displays no mass, no tenderness and no fullness. Left adnexum displays no mass, no tenderness and no fullness.   Bladder exhibits no tenderness and is normal is size and contour. No prolapse noted  Urethra is non tender and no discharge noted.  Anus is without hemorrhoids or prolapse noted. No bleeding on exam.    Musculoskeletal: Normal range of motion. She exhibits no edema and no tenderness.     Lymphadenopathy: She has no cervical adenopathy.     Neurological: She is alert and oriented to person, place, and time. She exhibits normal muscle tone.     Skin: Skin is warm and dry. No rash noted. She is not diaphoretic. No erythema. No pallor.     Psychiatric: She has a normal mood and affect. Her behavior is normal. Judgment and thought content normal.        Assessment:     1. Well woman exam with routine gynecological exam  THINPREP PAP W/HPV AND CTNG      2. Migraine with aura and without status migrainosus, not intractable        3. Menorrhagia with regular cycle  US-PELVIC  COMPLETE (TRANSABDOMINAL/TRANSVAGINAL) (COMBO)    TSH+PRL+FSH+TESTT+LH+T4F+DH      4. Painful menstrual periods  US-PELVIC COMPLETE (TRANSABDOMINAL/TRANSVAGINAL) (COMBO)    TSH+PRL+FSH+TESTT+LH+T4F+DH      5. Abnormality of uterine cervix  US-PELVIC COMPLETE (TRANSABDOMINAL/TRANSVAGINAL) (COMBO)        Reviewed other methods to help with bleeding. She is trying to avoid too many medications. She really would like a partial hysterectomy.  She has some desire for child bearing if possible, but doesn't want to go to great lengths or workup for the fertility. Would rather take care of the periods.    Discussed that she should attempt to change her BCM to a progesterone only method based on her history, but she states she has seen many many other doctors who have endorsed the low dose estrogen.  Options reviewed. Will provide refill x 2, and no others. She will need to see an MD to discuss her options.    Plan:   Pap and physical exam performed  Monthly SBE.  Counseling: breast self exam, STD prevention, use and side effects of OCPs, family planning choices, osteoporosis, and adequate intake of calcium and vitamin D  Encourage exercise and proper diet.  Mammograms starting @ age 40 annually.  See medications and orders placed in encounter report.    Will call with abnormal results  Follow up with MD for treatment options    Magda INGRAMM, APRN

## 2023-05-17 DIAGNOSIS — Z01.419 WELL WOMAN EXAM WITH ROUTINE GYNECOLOGICAL EXAM: ICD-10-CM

## 2023-05-18 LAB
C TRACH DNA GENITAL QL NAA+PROBE: NEGATIVE
CYTOLOGY REG CYTOL: NORMAL
HPV HR 12 DNA CVX QL NAA+PROBE: NEGATIVE
HPV16 DNA SPEC QL NAA+PROBE: NEGATIVE
HPV18 DNA SPEC QL NAA+PROBE: NEGATIVE
N GONORRHOEA DNA GENITAL QL NAA+PROBE: NEGATIVE
SPECIMEN SOURCE: NORMAL
SPECIMEN SOURCE: NORMAL

## 2023-05-23 ENCOUNTER — PHYSICAL THERAPY (OUTPATIENT)
Dept: PHYSICAL THERAPY | Facility: MEDICAL CENTER | Age: 33
End: 2023-05-23
Attending: NURSE PRACTITIONER
Payer: MEDICAID

## 2023-05-23 DIAGNOSIS — M54.2 NECK PAIN: ICD-10-CM

## 2023-05-23 PROCEDURE — 97110 THERAPEUTIC EXERCISES: CPT

## 2023-05-23 PROCEDURE — 97112 NEUROMUSCULAR REEDUCATION: CPT

## 2023-05-23 NOTE — OP THERAPY DAILY TREATMENT
"  Outpatient Physical Therapy  DAILY TREATMENT     [facility] Outpatient Physical Therapy  [address]  [place] [address]  Phone:  [phone]  Fax:  [phone]    Date: 05/23/2023    Patient: Rashmi Lorenzana  YOB: 1990  MRN: [id number]     Time Calculation                   Chief Complaint: Neck Problem    Visit #: 2    SUBJECTIVE:  Pt reports minimal improvement since their last PT session. She reports that she is going through a phase of her migraine \"cycle\" which she reports is limiting her ability to comfortably move her cervical spine. Pt reports home exercise program is going ok.            Therapeutic Exercises (CPT 69760):     1. Cervical AROM, Flexion, Ext, SB, Rotation (x10 ea. direction), Within pain free/near pain free limits (HEP)    2. Rows, OTB 2x12, HEP    3. Chin tuck with lift, 10x5\" holds, HEP    4. SNAGs, HEP    5. Levator scap stretch, 3x10\" holds, HEP    6. shrugs, 3# 2x10, HEP      Therapeutic Exercise Summary: Pt was educated today regarding expectations for \"poking into pain\" and avoiding 6-10/10 painful motions.      Access Code: [email or web address]    Exercises  - Seated Cervical Rotation AROM  - 1-2 x daily - 4-5 x weekly - 2 sets - 10-15 reps  - Seated Cervical Extension AROM  - 1-2 x daily - 4-5 x weekly - 2 sets - 10-15 reps  - Seated Cervical Flexion AROM  - 1-2 x daily - 4-5 x weekly - 2 sets - 10-15 reps  - Seated Cervical Sidebending AROM  - 1-2 x daily - 4-5 x weekly - 2 sets - 10-15 reps  - Supine Deep Neck Flexor Training - Repetitions  - 1-2 x daily - 4-5 x weekly - 2 sets - 10-15 reps - 5 hold  - Standing Shoulder Row with Anchored Resistance  - 1-2 x daily - 4-5 x weekly - 2 sets - 15 reps    Therapeutic Treatments and Modalities:     1. Neuromuscular Re-education (CPT 29919), manual facilitation techniques, Shoulder shrugs manually resisted, manual prom with manual resisted holds.    Time-based treatments/modalities:     "       Pain rating (1-10) before treatment:  5    ASSESSMENT:   Response to treatment: Pt had good tolerance for today's PT session. Pt will continue to benefit from skilled PT to address aforementioned deficits.      PLAN/RECOMMENDATIONS:   Plan for treatment: therapy treatment to continue next visit.  Planned interventions for next visit: continue with current treatment, neuromuscular re-education (CPT 14131), therapeutic activities (CPT 86540), and therapeutic exercise (CPT 57781).

## 2023-05-25 ENCOUNTER — APPOINTMENT (OUTPATIENT)
Dept: PHYSICAL THERAPY | Facility: MEDICAL CENTER | Age: 33
End: 2023-05-25
Attending: NURSE PRACTITIONER
Payer: MEDICAID

## 2023-05-30 ENCOUNTER — APPOINTMENT (OUTPATIENT)
Dept: PHYSICAL THERAPY | Facility: MEDICAL CENTER | Age: 33
End: 2023-05-30
Attending: NURSE PRACTITIONER
Payer: MEDICAID

## 2023-06-08 ENCOUNTER — APPOINTMENT (OUTPATIENT)
Dept: RADIOLOGY | Facility: MEDICAL CENTER | Age: 33
End: 2023-06-08
Attending: NURSE PRACTITIONER
Payer: MEDICAID

## 2023-06-08 DIAGNOSIS — N92.0 MENORRHAGIA WITH REGULAR CYCLE: ICD-10-CM

## 2023-06-08 DIAGNOSIS — N94.6 PAINFUL MENSTRUAL PERIODS: ICD-10-CM

## 2023-06-08 DIAGNOSIS — N88.9 ABNORMALITY OF UTERINE CERVIX: ICD-10-CM

## 2023-06-08 PROCEDURE — 76830 TRANSVAGINAL US NON-OB: CPT

## 2023-06-09 DIAGNOSIS — N83.202 LEFT OVARIAN CYST: ICD-10-CM

## 2023-06-12 ENCOUNTER — TELEPHONE (OUTPATIENT)
Dept: OBGYN | Facility: CLINIC | Age: 33
End: 2023-06-12
Payer: MEDICAID

## 2023-06-12 ENCOUNTER — HOSPITAL ENCOUNTER (EMERGENCY)
Facility: MEDICAL CENTER | Age: 33
End: 2023-06-12
Attending: EMERGENCY MEDICINE
Payer: MEDICAID

## 2023-06-12 VITALS
RESPIRATION RATE: 17 BRPM | SYSTOLIC BLOOD PRESSURE: 116 MMHG | WEIGHT: 119.93 LBS | DIASTOLIC BLOOD PRESSURE: 79 MMHG | HEART RATE: 87 BPM | TEMPERATURE: 97.9 F | HEIGHT: 68 IN | OXYGEN SATURATION: 97 % | BODY MASS INDEX: 18.18 KG/M2

## 2023-06-12 DIAGNOSIS — B08.4 HAND, FOOT AND MOUTH DISEASE: ICD-10-CM

## 2023-06-12 DIAGNOSIS — M79.10 MYALGIA: ICD-10-CM

## 2023-06-12 DIAGNOSIS — B34.9 VIRAL ILLNESS: ICD-10-CM

## 2023-06-12 PROCEDURE — A9270 NON-COVERED ITEM OR SERVICE: HCPCS | Performed by: EMERGENCY MEDICINE

## 2023-06-12 PROCEDURE — 99283 EMERGENCY DEPT VISIT LOW MDM: CPT

## 2023-06-12 PROCEDURE — 700102 HCHG RX REV CODE 250 W/ 637 OVERRIDE(OP): Performed by: EMERGENCY MEDICINE

## 2023-06-12 RX ORDER — NAPROXEN 500 MG/1
500 TABLET ORAL 2 TIMES DAILY WITH MEALS
Qty: 10 TABLET | Refills: 0 | Status: SHIPPED | OUTPATIENT
Start: 2023-06-12 | End: 2023-06-17

## 2023-06-12 RX ADMIN — LIDOCAINE HYDROCHLORIDE 30 ML: 20 SOLUTION OROPHARYNGEAL at 01:42

## 2023-06-12 ASSESSMENT — FIBROSIS 4 INDEX: FIB4 SCORE: 0.78

## 2023-06-12 NOTE — ED TRIAGE NOTES
"Chief Complaint   Patient presents with    Tingling    Hand Pain     Painful bumps on hands started yesterday. The bumps started spreading on the feet and mouth. The pt also reports associated whole body tingling and nausea. The pt denies any other flu-like symptoms. The pt denies coming into contact with allergen. Bumps are red, small, but non-swelling. Tongue is painful, no mouth swelling normal.       Pt ambulatory to triage. Pt A&Ox4, for the above complaint.     Pt to lobby . Pt educated on alerting staff in changes to condition. Pt verbalized understanding.     BP (!) 140/90   Pulse 91   Temp 36.3 °C (97.4 °F) (Oral)   Resp 18   Ht 1.727 m (5' 8\")   Wt 54.4 kg (119 lb 14.9 oz)   LMP 04/10/2023 (Exact Date) Comment: Irregular, heavy periods  SpO2 96%   BMI 18.24 kg/m²     "

## 2023-06-12 NOTE — ED PROVIDER NOTES
ED Provider Note    CHIEF COMPLAINT  Chief Complaint   Patient presents with    Tingling    Hand Pain     Painful bumps on hands started yesterday. The bumps started spreading on the feet and mouth. The pt also reports associated whole body tingling and nausea. The pt denies any other flu-like symptoms. The pt denies coming into contact with allergen. Bumps are red, small, but non-swelling. Tongue is painful, no mouth swelling normal.     EXTERNAL RECORDS REVIEWED  Outpatient Notes prior outpatient encounters and gynecological visits for work-up of some left ovarian cyst pain/discomfort.    HPI/ROS  LIMITATION TO HISTORY   Select: : None  OUTSIDE HISTORIAN(S):  Significant other at bedside    Aminata Lorenzana is a 33 y.o. female who presents to the emergency room for evaluation of several areas of painful lesions on her hands and feet in addition to development of painful lesions in her mouth.  She has had small amount of nauseousness, body aches and chills with no measured fevers.  She did have sick exposure to a child within the last week who had similar like symptoms.  She has not had any dysuria, no vomiting, no headaches, neck pain.    PAST MEDICAL HISTORY   has a past medical history of Stroke ([facility]).    SURGICAL HISTORY  patient denies any surgical history    FAMILY HISTORY  Family History   Problem Relation Age of Onset    Stroke Father     Diabetes Maternal Grandmother     Diabetes Paternal Grandfather        SOCIAL HISTORY  Social History     Tobacco Use    Smoking status: Never    Smokeless tobacco: Never   Vaping Use    Vaping Use: Never used   Substance and Sexual Activity    Alcohol use: Yes     Comment: ocass    Drug use: Yes     Types: Marijuana     Comment: ocass    Sexual activity: Yes     Partners: Male     Birth control/protection: Pill       CURRENT MEDICATIONS  Home Medications       Reviewed by Eliud Nolan R.N. (Registered Nurse) on 06/12/23 at 0049  Med List Status: Partial     Medication  "Last Dose Status   levonorgestrel-ethinyl estradiol (AVIANE) 0.1-20 MG-MCG per tablet  Active   levonorgestrel-ethinyl estradiol (LUTERA) 0.1-20 MG-MCG per tablet  Active   Probiotic, Lactobacillus, Cap  Active   SUMAtriptan (IMITREX) 50 MG Tab  Active                    ALLERGIES  Allergies   Allergen Reactions    [place] (Diagnostic) Anxiety, Hives, Itching, Rash, Shortness of Breath and Swelling     Other reaction(s): Unspecified    [place] Meal Unspecified    Camphor      Other reaction(s): Unspecified    Cinnamon Anxiety, Hives, Itching, Rash, Shortness of Breath and Swelling       PHYSICAL EXAM  VITAL SIGNS: /79   Pulse 84   Temp 36.3 °C (97.4 °F) (Oral)   Resp 18   Ht 1.727 m (5' 8\")   Wt 54.4 kg (119 lb 14.9 oz)   LMP 04/10/2023 (Exact Date) Comment: Irregular, heavy periods  SpO2 97%   BMI 18.24 kg/m²    Genl: F sitting in chair comfortably, speaking clearly, appears in no acute distress   Head: NC/AT   ENT: Mucous membranes moist, posterior pharynx notable for multiple areas of herpangina on the soft palate.  Posterior oropharynx does not have any asymmetry, sublingual tissues are soft, uvula midline, nares patent bilaterally   Eyes: Normal sclera, pupils equal round reactive to light  Neck: Supple, FROM, subtle submandibular bilateral LAD appreciated   Pulmonary: Lungs are clear to auscultation bilaterally  Chest: No TTP  CV:  RRR, no murmur appreciated, pulses 2+ in both upper and lower extremities,  Abdomen: soft, NT/ND; no rebound/guarding  Musculoskeletal: Pain free ROM of the neck. Moving upper and lower extremities and spontaneous in coordinated fashion  Skin: Central clear lesions with surrounding red ring are noted on palms of hands and feet.  No purpura, no petechiae, no lesions are noted over joint surfaces.  No pallor or jaundice.  No cyanosis.  Warm and dry.     DIAGNOSTIC STUDIES / PROCEDURES    COURSE & MEDICAL DECISION MAKING    ED Observation Status? No; Patient does not meet " criteria for ED Observation.     INITIAL ASSESSMENT, COURSE AND PLAN  Care Narrative: Patient presents emergency room for symptoms as described above.  She is nontoxic, afebrile, had recent viral-like symptomology and now presents with lesions that appear to be both herpangina and proximal like on hands and feet.  This fits with a likely hand-foot-and-mouth distribution and I do not see other evolving findings to suggest that there is signs of an acute concurrent bacterial infection.  She remains nontoxic, she tolerates p.o. GI cocktail well with some alleviation of her throat pain.  She will be given prescription for Magic mouthwash, advised about the likely timing and natural course of this and the lack of current need for any antibiosis.  Outpatient follow-up would be recommended if she has pain and discomfort that continues past the usual 7 days, she finds that she is having any neck pain, headaches, vision changes or any evolving changes with fevers and increased heart rate I would recommend reevaluation here in the emergency department.  Questions are addressed with her and left one at bedside and they are discharged home in stable condition.    DISPOSITION AND DISCUSSIONS  I have discussed management of the patient with the following physicians and JIGNESH's:  none    Discussion of management with other QHP or appropriate source(s): None     Escalation of care considered, and ultimately not performed:IV fluids, blood analysis, and diagnostic imaging    FINAL DIAGNOSIS  1. Hand, foot and mouth disease    2. Viral illness    3. Myalgia         Electronically signed by: Kang Luis M.D., 6/12/2023 1:20 AM

## 2023-06-12 NOTE — ED NOTES
Pt signed and given d/c papers. Discussed x1 rx sent to pref pharmacy and 1 paper rx going w/ pt. Also discussed f/u and standard hygiene to prevent viral spread. Pt denies questions/concerns. Pt ambulated out of the dept w/ steady gait A&O x4 w/ all belongings accompanied by SO

## 2023-06-12 NOTE — TELEPHONE ENCOUNTER
----- Message from Magda Pepper C.N.M. sent at 6/9/2023  8:18 AM PDT -----  I see she has follow up already.  Her ultrasound did show the bicornuate uterus. She also has a left sided ovarian cyst that needs to be rechecked. I am putting in another US order to rechecked. Have her schedule this repeat US in 1 month.  Thanks    Magda      06/12/23  7413 Left message for pt to call back regarding US results.   1601 pt called back and notified as above. Pt verbalized understanding and stated she was aware of the bicornuate uterus. [facility]/u [facility], phone# provided . Pt agreed and verbalized understanding.

## 2023-07-05 ENCOUNTER — GYNECOLOGY VISIT (OUTPATIENT)
Dept: OBGYN | Facility: CLINIC | Age: 33
End: 2023-07-05
Payer: MEDICAID

## 2023-07-05 VITALS — SYSTOLIC BLOOD PRESSURE: 98 MMHG | DIASTOLIC BLOOD PRESSURE: 68 MMHG | BODY MASS INDEX: 17.7 KG/M2 | WEIGHT: 116.4 LBS

## 2023-07-05 DIAGNOSIS — G43.109 MIGRAINE WITH AURA AND WITHOUT STATUS MIGRAINOSUS, NOT INTRACTABLE: ICD-10-CM

## 2023-07-05 DIAGNOSIS — Q51.9 UTERINE ANOMALY: ICD-10-CM

## 2023-07-05 DIAGNOSIS — N92.0 MENORRHAGIA WITH REGULAR CYCLE: Primary | ICD-10-CM

## 2023-07-05 DIAGNOSIS — N94.6 DYSMENORRHEA: ICD-10-CM

## 2023-07-05 PROCEDURE — 3074F SYST BP LT 130 MM HG: CPT | Performed by: OBSTETRICS & GYNECOLOGY

## 2023-07-05 PROCEDURE — 99214 OFFICE O/P EST MOD 30 MIN: CPT | Performed by: OBSTETRICS & GYNECOLOGY

## 2023-07-05 PROCEDURE — 3078F DIAST BP <80 MM HG: CPT | Performed by: OBSTETRICS & GYNECOLOGY

## 2023-07-05 ASSESSMENT — FIBROSIS 4 INDEX: FIB4 SCORE: 0.78

## 2023-07-05 NOTE — PROGRESS NOTES
Patient here for GYN   Last seen on: 5/16/23  Pt states is here for lab results and more information on hysterectomy.   Pharmacy verified:  # [phone]

## 2023-07-05 NOTE — PROGRESS NOTES
"GYN Surgical Consult Note    CC:   Considering hysterectomy    HPI:   Patient is a 33 y.o.  who presents complaining of heavy and painful menses all her life.  She describes 1 week of pain prior to menses, intense pain and \"waterfall\" bleeding during menses and then persistent pain afterwards.  She does report that these problems are \"better\" on her current combined oral contraceptive however, she carries a diagnosis of migraines with aura and has been told multiple times she shouldn't even be on COCs.  Several migraines have presented with \"stroke like symptoms\" where patient was unable to form appropriate words and sentences. She had extensive workup in [place] and it seems complex migraine was the final diagnosis. She does not see neurology and \"doesn't want to take medications\" but states the migraines have not really been better or worse during the time she's been on contraceptives.    She has a suspected uterine anomaly (stated to be bicornuate on US however uterine septum cannot be excluded on basic US and appearance of images does look more concerning for  cavities) so this would make IUD contraindicated for her.  She admits she has never tried depo but \"has no interest in that\" and expressly states today she does not want to be on hormonal contraception the rest of her life.      She has not had any children and in some sense does desire pregnancy but has been told \"She is unable to get pregnant\" though she is not sure why other than the uterine anomaly.  She does not seem to have seen YOBANI in the past or actually explored complete fertility workup and uterine evaluation and we discussed today this is a consideration.    Nonetheless, she comes to me today stating she does NOT desire children and is ready to be off hormonal contraception but does not want to go back to the severe pain and bleeding that comes with being off contraception.    She is in a relationship of 9mo and states her " "partner has 2 kids of his own so she is satisfied with this as her form of parenthood.  She does question me at conclusion of visit however if I agreed that she could \"never get pregnant.\"      GYNECOLOGIC HISTORY:   Current Sexual Activity: yes  History of sexually transmitted diseases? No  Abnormal discharge? No     Menstrual History  Patient's last menstrual period was No LMP recorded.  Periods are regular  q 28 days, lasting 4 days on contraception but 7-9 days off    Clots or heavy flow:  when not on contraception  Intermenstrual bleeding/spotting: No  Sexual problems: No  Significant pelvic pain: No  Dysmenorrhea: Yes, severe, when not on contraception  Bothersome menopausal symptoms: No     Contraception  Aviane     Pap History  Last Pap: 2023 - wnl  Hx Moderate or Severe Dysplasia : No     Sexually active:    Social History     Substance and Sexual Activity   Sexual Activity Yes    Partners: Male    Birth control/protection: Pill       OBSTETRIC HISTORY:  OB History    Para Term  AB Living   0 0 0 0 0 0   SAB IAB Ectopic Molar Multiple Live Births   0 0 0 0 0 0       MEDICAL HISTORY:  Past Medical History:   Diagnosis Date    Migraine with aura     presented with \"stroke like symptoms\"    Stroke ([facility]) 2017       SURGICAL HISTORY:  No past surgical history on file.    FAMILY HISTORY:  Family History   Problem Relation Age of Onset    Stroke Father     Diabetes Maternal Grandmother     Diabetes Paternal Grandfather        ALLERGIES / REACTIONS:  Allergies   Allergen Reactions    [place] (Diagnostic) Anxiety, Hives, Itching, Rash, Shortness of Breath and Swelling     Other reaction(s): Unspecified    [place] Meal Unspecified    Camphor      Other reaction(s): Unspecified    Cinnamon Anxiety, Hives, Itching, Rash, Shortness of Breath and Swelling       SOCIAL HISTORY:   reports that she has never smoked. She has never used smokeless tobacco. She reports current alcohol use. She reports current drug " use. Drug: Marijuana.    ROS:   Positive ROS: none  Gen: no fevers or chills, no significant weight loss or gain, excessive fatigue  Respiratory:  no cough or dyspnea  Cardiac:  no chest pain, no palpitations, no syncope  Breast: no breast discharge, pain, lump or skin changes  GI:  no heartburn, no abdominal pain, no nausea or vomiting  Urinary: no dysuria, urgency, frequency, incontinence   Psych: no depression or anxiety  Neuro: no migraines with aura, fainting spells, numbness or tingling  Extremities: no joint pain, persistently swollen ankles, recurrent leg cramps       PHYSICAL EXAMINATION:  Vital Signs:   Vitals:    07/05/23 1103   BP: 98/68   Weight: 116 lb 6.4 oz     Body mass index is 17.7 kg/m².  Constitutional: The patient is well developed and well nourished.  Psychiatric: Patient is oriented to time place and person.   Skin: No rash observed.  Neck: Neck appears symmetric.  Respiratory: normal effort  Abdomen: Soft, non-tender.  Pelvic Exam: deferred today  Extremeties: Legs are symmetric and without tenderness. There is no edema present.    IMAGING:  HISTORY/REASON FOR EXAM:  Pain; Painful, heavy bleeding. Possible septate/bicornuate uterus (        TECHNIQUE/EXAM DESCRIPTION:  Transabdominal and transvaginal pelvic ultrasound.     COMPARISON:   None     FINDINGS:  Both transabdominal and transvaginal scanning were performed to optimally visualize the pelvis.     UTERUS:  The uterus is bicornuate and measures 3.61 cm x 8.57 cm x 6.48 cm.  The uterine myometrium is within normal limits.  The endometrial echo complex measures 0.33 cm on the right and 0.25 cm on the left  The endometrium is unremarkable in appearance and thickness for age and menstrual status.        OVARIES:  The right ovary measures 1.07 cm x 2.63 cm x 1.16 cm. Duplex Doppler examination of the right ovary shows normal waveforms.     The left ovary measures 4.25 cm x 4.34 cm x 4.66 cm. Duplex Doppler examination of the left ovary  "shows normal waveforms. There is a 3.8 x 4.1 x 4.1 cm left ovarian cyst    There is no free fluid seen.     IMPRESSION:     1.  Bicornuate uterus.     2.  4.1 cm left ovarian cyst. Consider follow-up ultrasound in one month.      ASSESSMENT AND PLAN:  33 y.o.       1. Menorrhagia with regular cycle   - while pt is controlled on combine OCPs, these are CONTRAINDICATED with her complex migraine with aura history   - progestin only pills or depo or implant would be next option for her but pt seems resistant to the idea of wanting to control this with hormones for the rest of her reproductive life   - she expresses certainly (initially) to be done childbearing and this was discussed in detail today   - discussed that a comprehensive (best with YOBANI) workup with be needed to determine the extent of her uterine anomaly and whether it truly precludes pregnancy and/or could be treated. Encouraged patient to consider this thoughtfully as hysterectomy is obviously a form of permanent sterilization.     - sterilization consent signed today. Discussed this has to be on file for 30 days prior to surgery per Medicaid   - IUD contraindicated and due to uterine anomaly and specific complaints, I do not think ablation is appropriate for this patient   -pt encouraged to consider childbearing carefully or consider complete workup if she wants her question of \"possibility\" answered more thoroughly prior to hysterectomy   - reviewed robotic hysterectomy route and recovery. Will review in detail at preop visit    2. Dysmenorrhea   - as above. Controlled on combined OCP which are contraindicated. Does not want to stop at this time unless stopping for hysterectomy. At this time, she declines trial of progestin only hormonal forms    3. Uterine anomaly   - discussed HSG or hysteroscopy needed to determine presence of or extent of cavity abnormality    4. Migraine with aura and without status migrainosus, not intractable   - should not " be on estrogen containing contraceptives        Kiarra Branch D.O.

## 2023-07-11 ENCOUNTER — PATIENT MESSAGE (OUTPATIENT)
Dept: OBGYN | Facility: CLINIC | Age: 33
End: 2023-07-11
Payer: MEDICAID

## 2023-07-11 DIAGNOSIS — N92.0 MENORRHAGIA WITH REGULAR CYCLE: ICD-10-CM

## 2023-07-11 DIAGNOSIS — Z30.41 ENCOUNTER FOR SURVEILLANCE OF CONTRACEPTIVE PILLS: ICD-10-CM

## 2023-07-11 DIAGNOSIS — Q51.9 UTERINE ANOMALY: Primary | ICD-10-CM

## 2023-07-11 DIAGNOSIS — N94.6 PAINFUL MENSTRUAL PERIODS: ICD-10-CM

## 2023-07-25 RX ORDER — LEVONORGESTREL AND ETHINYL ESTRADIOL 0.1-0.02MG
1 KIT ORAL DAILY
Qty: 28 TABLET | Refills: 5 | Status: SHIPPED | OUTPATIENT
Start: 2023-07-25 | End: 2023-09-12 | Stop reason: SDUPTHER

## 2023-07-31 ENCOUNTER — APPOINTMENT (OUTPATIENT)
Dept: RADIOLOGY | Facility: MEDICAL CENTER | Age: 33
End: 2023-07-31
Attending: NURSE PRACTITIONER
Payer: MEDICAID

## 2023-08-22 ENCOUNTER — TELEPHONE (OUTPATIENT)
Dept: PHYSICAL THERAPY | Facility: MEDICAL CENTER | Age: 33
End: 2023-08-22
Payer: MEDICAID

## 2023-08-22 NOTE — OP THERAPY DISCHARGE SUMMARY
Outpatient Physical Therapy  DISCHARGE SUMMARY NOTE      [facility] Outpatient Physical Therapy  [address]  [place] [address]  Phone:  [phone]  Fax:  [phone]    Date of Visit: 08/22/2023    Patient: Rashmi Lorenzana  YOB: 1990  MRN: [id number]     Referring Provider: No referring provider defined for this encounter.   Referring Diagnosis No admission diagnoses are documented for this encounter.         Functional Assessment Used        Your patient is being discharged from Physical Therapy with the following comments:   Patient cancelled or missed more than 2 scheduled appointments/non-compliant    Comments:  Pt failed to comply with attendance policy.     Limitations Remaining:  Pt status unknown at this time.    Recommendations:  Discharge    Musa Mckeon, PT    Date: 8/22/2023

## 2023-09-12 DIAGNOSIS — Z30.41 ENCOUNTER FOR SURVEILLANCE OF CONTRACEPTIVE PILLS: ICD-10-CM

## 2023-09-12 DIAGNOSIS — N92.0 MENORRHAGIA WITH REGULAR CYCLE: ICD-10-CM

## 2023-09-12 DIAGNOSIS — N94.6 PAINFUL MENSTRUAL PERIODS: ICD-10-CM

## 2023-09-15 ENCOUNTER — PATIENT MESSAGE (OUTPATIENT)
Dept: OBGYN | Facility: CLINIC | Age: 33
End: 2023-09-15
Payer: MEDICAID

## 2023-09-18 ENCOUNTER — PATIENT MESSAGE (OUTPATIENT)
Dept: OBGYN | Facility: CLINIC | Age: 33
End: 2023-09-18
Payer: MEDICAID

## 2023-09-18 DIAGNOSIS — Q51.9 UTERINE ANOMALY: ICD-10-CM

## 2023-09-18 RX ORDER — LEVONORGESTREL AND ETHINYL ESTRADIOL 0.1-0.02MG
1 KIT ORAL DAILY
Qty: 28 TABLET | Refills: 5 | Status: SHIPPED | OUTPATIENT
Start: 2023-09-18 | End: 2023-10-25 | Stop reason: SDUPTHER

## 2023-10-25 DIAGNOSIS — Z30.41 ENCOUNTER FOR SURVEILLANCE OF CONTRACEPTIVE PILLS: ICD-10-CM

## 2023-10-25 DIAGNOSIS — N92.0 MENORRHAGIA WITH REGULAR CYCLE: ICD-10-CM

## 2023-10-25 DIAGNOSIS — N94.6 PAINFUL MENSTRUAL PERIODS: ICD-10-CM

## 2023-11-02 RX ORDER — LEVONORGESTREL AND ETHINYL ESTRADIOL 0.1-0.02MG
1 KIT ORAL DAILY
Qty: 28 TABLET | Refills: 5 | Status: SHIPPED | OUTPATIENT
Start: 2023-11-02 | End: 2024-01-09 | Stop reason: SDUPTHER

## 2023-11-09 ENCOUNTER — HOSPITAL ENCOUNTER (OUTPATIENT)
Dept: LAB | Facility: MEDICAL CENTER | Age: 33
End: 2023-11-09
Attending: OBSTETRICS & GYNECOLOGY
Payer: MEDICAID

## 2023-11-09 ENCOUNTER — HOSPITAL ENCOUNTER (OUTPATIENT)
Dept: RADIOLOGY | Facility: MEDICAL CENTER | Age: 33
End: 2023-11-09
Attending: OBSTETRICS & GYNECOLOGY
Payer: MEDICAID

## 2023-11-09 DIAGNOSIS — Q51.9 UTERINE ANOMALY: ICD-10-CM

## 2023-11-09 LAB — HCG SERPL QL: NEGATIVE

## 2023-11-09 PROCEDURE — 36415 COLL VENOUS BLD VENIPUNCTURE: CPT

## 2023-11-09 PROCEDURE — 84703 CHORIONIC GONADOTROPIN ASSAY: CPT

## 2023-11-09 PROCEDURE — 76830 TRANSVAGINAL US NON-OB: CPT

## 2023-11-10 ENCOUNTER — GYNECOLOGY VISIT (OUTPATIENT)
Dept: OBGYN | Facility: CLINIC | Age: 33
End: 2023-11-10
Payer: MEDICAID

## 2023-11-10 VITALS — BODY MASS INDEX: 17.94 KG/M2 | WEIGHT: 118 LBS

## 2023-11-10 DIAGNOSIS — Q51.9 UTERINE ANOMALY: Primary | ICD-10-CM

## 2023-11-10 PROCEDURE — 99212 OFFICE O/P EST SF 10 MIN: CPT | Performed by: OBSTETRICS & GYNECOLOGY

## 2023-11-10 ASSESSMENT — FIBROSIS 4 INDEX: FIB4 SCORE: 0.78

## 2023-11-10 NOTE — PROGRESS NOTES
"GYN FOLLOW UP VISIT    CC:  US followup       HPI: Patient is a 33 y.o.  who presents for follow up for after anticipated SHG she was supposed to have yesterday.  However I received call from Radiology stating SHG would not be able to determine difference between septate and bicornuate uterus and MRI was recommend.  Patient still just \"wants to know\" whether her uterine cavity would be amenable to pregnancy or not (she was once told it wouldn't be) before she decides to have hysterectomy.  I requested radiology perform the test as I believed it would answer our question but pt was additionally counseled by radiology that the test was painful and invasive and would not give her the answer she desired so pt then opted to defer. She is here today requesting MRI.       ROS:   General: denies fever / chills  HEENT: denies sore throat:  CV: denies chest pain:  Repiratory: denies shortness of breath  GI: denies abdominal pain  : denies dysuria:    PFSH:  I personally reviewed the past medical and surgical histories.       PHYSICAL EXAMINATION:  Vital Signs:   Vitals:    11/10/23 1120   Weight: 118 lb     Body mass index is 17.94 kg/m².    Gen: appears well, NAD  Respiratory: normal effort    Exam deferred    ASSESSMENT AND PLAN:  33 y.o.    1. Uterine anomaly   - pt still wants better understanding of her uterine cavity so we elected today via shared decision making to order the pelvic MRI   - she is still strongly considering hysterectomy but just wants to make sure she has all her answers before making that choice   - she is continuing her current OCP    - MR-PELVIS WITH; Future      Kiarra Branch D.O.    "

## 2023-12-10 ENCOUNTER — HOSPITAL ENCOUNTER (OUTPATIENT)
Dept: RADIOLOGY | Facility: MEDICAL CENTER | Age: 33
End: 2023-12-10
Attending: OBSTETRICS & GYNECOLOGY
Payer: MEDICAID

## 2023-12-10 DIAGNOSIS — Q51.9 UTERINE ANOMALY: ICD-10-CM

## 2023-12-14 ENCOUNTER — HOSPITAL ENCOUNTER (OUTPATIENT)
Dept: RADIOLOGY | Facility: MEDICAL CENTER | Age: 33
End: 2023-12-14
Attending: OBSTETRICS & GYNECOLOGY
Payer: MEDICAID

## 2023-12-14 PROCEDURE — 700117 HCHG RX CONTRAST REV CODE 255: Mod: UD | Performed by: OBSTETRICS & GYNECOLOGY

## 2023-12-14 PROCEDURE — A9579 GAD-BASE MR CONTRAST NOS,1ML: HCPCS | Mod: UD | Performed by: OBSTETRICS & GYNECOLOGY

## 2023-12-14 PROCEDURE — 72197 MRI PELVIS W/O & W/DYE: CPT

## 2023-12-14 RX ADMIN — GADOTERIDOL 10 ML: 279.3 INJECTION, SOLUTION INTRAVENOUS at 15:04

## 2023-12-19 ENCOUNTER — OFFICE VISIT (OUTPATIENT)
Dept: MEDICAL GROUP | Facility: MEDICAL CENTER | Age: 33
End: 2023-12-19
Attending: NURSE PRACTITIONER
Payer: MEDICAID

## 2023-12-19 ENCOUNTER — HOSPITAL ENCOUNTER (OUTPATIENT)
Dept: RADIOLOGY | Facility: MEDICAL CENTER | Age: 33
End: 2023-12-19
Attending: NURSE PRACTITIONER
Payer: MEDICAID

## 2023-12-19 VITALS
HEART RATE: 113 BPM | BODY MASS INDEX: 19.22 KG/M2 | TEMPERATURE: 99 F | RESPIRATION RATE: 16 BRPM | SYSTOLIC BLOOD PRESSURE: 100 MMHG | OXYGEN SATURATION: 97 % | HEIGHT: 65 IN | WEIGHT: 115.4 LBS | DIASTOLIC BLOOD PRESSURE: 60 MMHG

## 2023-12-19 DIAGNOSIS — R52 BODY ACHES: ICD-10-CM

## 2023-12-19 DIAGNOSIS — R05.9 COUGH, UNSPECIFIED TYPE: ICD-10-CM

## 2023-12-19 DIAGNOSIS — J06.9 UPPER RESPIRATORY TRACT INFECTION, UNSPECIFIED TYPE: ICD-10-CM

## 2023-12-19 DIAGNOSIS — H92.09 EARACHE: ICD-10-CM

## 2023-12-19 DIAGNOSIS — R50.9 FEVER, UNSPECIFIED FEVER CAUSE: ICD-10-CM

## 2023-12-19 DIAGNOSIS — R09.89 RUNNY NOSE: ICD-10-CM

## 2023-12-19 LAB
FLUAV RNA SPEC QL NAA+PROBE: NEGATIVE
FLUBV RNA SPEC QL NAA+PROBE: NEGATIVE
RSV RNA SPEC QL NAA+PROBE: NEGATIVE
SARS-COV-2 RNA RESP QL NAA+PROBE: NEGATIVE

## 2023-12-19 PROCEDURE — 71046 X-RAY EXAM CHEST 2 VIEWS: CPT

## 2023-12-19 PROCEDURE — 3078F DIAST BP <80 MM HG: CPT | Performed by: NURSE PRACTITIONER

## 2023-12-19 PROCEDURE — 99213 OFFICE O/P EST LOW 20 MIN: CPT | Performed by: NURSE PRACTITIONER

## 2023-12-19 PROCEDURE — 0241U POCT CEPHEID COV-2, FLU A/B, RSV - PCR: CPT | Performed by: NURSE PRACTITIONER

## 2023-12-19 PROCEDURE — 99214 OFFICE O/P EST MOD 30 MIN: CPT | Performed by: NURSE PRACTITIONER

## 2023-12-19 PROCEDURE — 3074F SYST BP LT 130 MM HG: CPT | Performed by: NURSE PRACTITIONER

## 2023-12-19 RX ORDER — IPRATROPIUM BROMIDE AND ALBUTEROL SULFATE 2.5; .5 MG/3ML; MG/3ML
3 SOLUTION RESPIRATORY (INHALATION) EVERY 6 HOURS PRN
Qty: 50 EACH | Refills: 1 | Status: SHIPPED | OUTPATIENT
Start: 2023-12-19

## 2023-12-19 RX ORDER — BENZONATATE 100 MG/1
100 CAPSULE ORAL 2 TIMES DAILY PRN
Qty: 20 CAPSULE | Refills: 0 | Status: SHIPPED | OUTPATIENT
Start: 2023-12-19 | End: 2024-01-16

## 2023-12-19 RX ORDER — GUAIFENESIN 1200 MG/1
1200 TABLET, EXTENDED RELEASE ORAL EVERY 12 HOURS
Qty: 20 TABLET | Refills: 0 | Status: SHIPPED | OUTPATIENT
Start: 2023-12-19

## 2023-12-19 RX ORDER — ALBUTEROL SULFATE 90 UG/1
2 AEROSOL, METERED RESPIRATORY (INHALATION) EVERY 6 HOURS PRN
Qty: 8.5 G | Refills: 1 | Status: SHIPPED | OUTPATIENT
Start: 2023-12-19

## 2023-12-19 RX ORDER — ALBUTEROL SULFATE 90 UG/1
2 AEROSOL, METERED RESPIRATORY (INHALATION) EVERY 6 HOURS PRN
Qty: 8.5 G | Refills: 1 | Status: SHIPPED | OUTPATIENT
Start: 2023-12-19 | End: 2023-12-19

## 2023-12-19 ASSESSMENT — FIBROSIS 4 INDEX: FIB4 SCORE: 0.78

## 2023-12-19 NOTE — LETTER
December 19, 2023    To Whom It May Concern:         This is confirmation that Aminata Lorenzana attended her scheduled appointment with THU GalanRАЛЕКСАНДР on 12/19/23. Please excuse from work 12/18- 12/19. She may return to work when she is afebrile and has improved.          If you have any questions please do not hesitate to call me at the phone number listed below.    Sincerely,          PATRICIO GalanP.RJbN.  [phone]

## 2023-12-26 ENCOUNTER — OFFICE VISIT (OUTPATIENT)
Dept: MEDICAL GROUP | Facility: MEDICAL CENTER | Age: 33
End: 2023-12-26
Attending: FAMILY MEDICINE
Payer: MEDICAID

## 2023-12-26 ENCOUNTER — HOSPITAL ENCOUNTER (OUTPATIENT)
Facility: MEDICAL CENTER | Age: 33
End: 2023-12-26
Attending: FAMILY MEDICINE
Payer: MEDICAID

## 2023-12-26 VITALS
WEIGHT: 114 LBS | RESPIRATION RATE: 16 BRPM | OXYGEN SATURATION: 99 % | TEMPERATURE: 98.6 F | SYSTOLIC BLOOD PRESSURE: 100 MMHG | DIASTOLIC BLOOD PRESSURE: 70 MMHG | HEART RATE: 84 BPM | BODY MASS INDEX: 18.99 KG/M2 | HEIGHT: 65 IN

## 2023-12-26 DIAGNOSIS — T36.95XA ANTIBIOTIC-INDUCED YEAST INFECTION: ICD-10-CM

## 2023-12-26 DIAGNOSIS — N30.00 ACUTE CYSTITIS WITHOUT HEMATURIA: ICD-10-CM

## 2023-12-26 DIAGNOSIS — J20.9 ACUTE BRONCHITIS, UNSPECIFIED ORGANISM: ICD-10-CM

## 2023-12-26 DIAGNOSIS — B37.9 ANTIBIOTIC-INDUCED YEAST INFECTION: ICD-10-CM

## 2023-12-26 LAB
APPEARANCE UR: ABNORMAL
BACTERIA #/AREA URNS HPF: ABNORMAL /HPF
BILIRUB UR QL STRIP.AUTO: NEGATIVE
COLOR UR: YELLOW
EPI CELLS #/AREA URNS HPF: ABNORMAL /HPF
GLUCOSE UR STRIP.AUTO-MCNC: NEGATIVE MG/DL
HYALINE CASTS #/AREA URNS LPF: ABNORMAL /LPF
KETONES UR STRIP.AUTO-MCNC: 15 MG/DL
LEUKOCYTE ESTERASE UR QL STRIP.AUTO: ABNORMAL
MICRO URNS: ABNORMAL
NITRITE UR QL STRIP.AUTO: NEGATIVE
PH UR STRIP.AUTO: 6 [PH] (ref 5–8)
PROT UR QL STRIP: NEGATIVE MG/DL
RBC # URNS HPF: ABNORMAL /HPF
RBC UR QL AUTO: NEGATIVE
SP GR UR STRIP.AUTO: 1.02
UROBILINOGEN UR STRIP.AUTO-MCNC: 0.2 MG/DL
WBC #/AREA URNS HPF: ABNORMAL /HPF

## 2023-12-26 PROCEDURE — 3074F SYST BP LT 130 MM HG: CPT | Performed by: FAMILY MEDICINE

## 2023-12-26 PROCEDURE — 3078F DIAST BP <80 MM HG: CPT | Performed by: FAMILY MEDICINE

## 2023-12-26 PROCEDURE — 81001 URINALYSIS AUTO W/SCOPE: CPT

## 2023-12-26 PROCEDURE — 99214 OFFICE O/P EST MOD 30 MIN: CPT | Performed by: FAMILY MEDICINE

## 2023-12-26 PROCEDURE — 99213 OFFICE O/P EST LOW 20 MIN: CPT | Performed by: FAMILY MEDICINE

## 2023-12-26 RX ORDER — LEVOFLOXACIN 750 MG/1
750 TABLET, FILM COATED ORAL DAILY
Qty: 7 TABLET | Refills: 0 | Status: ON HOLD | OUTPATIENT
Start: 2023-12-26 | End: 2024-01-18

## 2023-12-26 RX ORDER — FLUCONAZOLE 150 MG/1
TABLET ORAL
Qty: 2 TABLET | Refills: 0 | Status: ON HOLD | OUTPATIENT
Start: 2023-12-26 | End: 2024-01-18

## 2023-12-26 ASSESSMENT — FIBROSIS 4 INDEX: FIB4 SCORE: 0.78

## 2023-12-26 NOTE — PROGRESS NOTES
"Subjective   Chief Complaint   Patient presents with    Cough     Fever,nasal congestion    Dysuria    Pharyngitis    Nausea        HPI:   Patient presents today for persistent cough and fever.  States she is on week 3 of illness.  Patient was evaluated by her PCP a week ago and was prescribed symptomatic treatment with Mucinex, Tessalon Perles, and DuoNeb.  She reports symptoms have been minimally effective.  She endorses previous history of frequent upper respiratory infections since childhood.  States fever of 102 overnight.  Improved with over-the-counter antipyretics.  She is tolerating p.o.  She is also concerned about UTI due to malodor.  States symptoms developed 4 days ago.  She does have a history of recurrent UTI and symptoms are similar to what she has been experiencing.  Cultures and sensitivity information available in chart.  States trigger includes sexual activity.  She also has a history of antibiotic induced yeast infections.    Health Maintenance Due   Topic Date Due    HIV Screening  Never done    Hepatitis B Vaccine (Hep B) (1 of 3 - 3-dose series) Never done    Hepatitis C Screening  Never done    COVID-19 Vaccine (1) Never done    Chickenpox Vaccine (Varicella) (1 of 2 - 2-dose childhood series) Never done    Influenza Vaccine (1) 09/01/2023       Objective   Social History     Tobacco Use    Smoking status: Never    Smokeless tobacco: Never   Vaping Use    Vaping Use: Never used   Substance Use Topics    Alcohol use: Yes     Comment: ocass    Drug use: Yes     Types: Marijuana     Comment: ocass       Exam:  /70   Pulse 84   Temp 37 °C (98.6 °F)   Resp 16   Ht 1.651 m (5' 5\")   Wt 51.7 kg (114 lb)   SpO2 99%   BMI 18.97 kg/m²     Physical Exam  Constitutional: Alert, no distress  Skin: No rashes in visible areas  Eye: Conjunctiva clear, lids normal  Ears: TM within normal limits   Respiratory: Unlabored respiratory effort, intermittent cough  MSK: Normal gait, moves all " extremities  Psych: Alert and oriented x3, normal affect and mood    Allergies   Allergen Reactions    [place] (Diagnostic) Anxiety, Hives, Itching, Rash, Shortness of Breath and Swelling     Other reaction(s): Unspecified    [place] Meal Unspecified    Camphor      Other reaction(s): Unspecified    Cinnamon Anxiety, Hives, Itching, Rash, Shortness of Breath and Swelling       [id number] - [place], [place] - [address]  [address] [place] [address]  Phone: [phone] Fax: [phone]    Current Outpatient Medications   Medication Sig Dispense Refill    fluconazole (DIFLUCAN) 150 MG tablet Take one tablet orally for yeast infection, if symptoms persist, may repeat treatment after 72 hours. 2 Tablet 0    levoFLOXacin (LEVAQUIN) 750 MG tablet Take 1 Tablet by mouth every day. 7 Tablet 0    levonorgestrel-ethinyl estradiol (AVIANE) 0.1-20 MG-MCG per tablet Take 1 Tablet by mouth every day. 28 Tablet 5    ipratropium-albuterol (DUONEB) 0.5-2.5 (3) MG/3ML nebulizer solution Take 3 mL by nebulization every 6 hours as needed for Shortness of Breath. 50 Each 1    guaiFENesin ER 1200 MG TABLET SR 12 HR Take 1 Tablet by mouth every 12 hours. 20 Tablet 0    benzonatate (TESSALON) 100 MG Cap Take 1 Capsule by mouth 2 times a day as needed for Cough. 20 Capsule 0    albuterol 108 (90 Base) MCG/ACT Aero Soln inhalation aerosol Inhale 2 Puffs every 6 hours as needed for Shortness of Breath. 8.5 g 1    Probiotic, Lactobacillus, Cap Probiotic      SUMAtriptan (IMITREX) 50 MG Tab Take 1 Tablet by mouth one time as needed for Migraine for up to 1 dose. May repeat dose one time after 2 hours 10 Tablet 3     No current facility-administered medications for this visit.       Assessment & Plan    33 y.o. female with the following -     1. Acute cystitis without hematuria  - Acute on chronic  - Based on history and presentation recommended treatment with Levaquin for empiric treatment of UTI as well as bronchitis  - URINALYSIS,CULTURE IF  INDICATED; Future  - levoFLOXacin (LEVAQUIN) 750 MG tablet; Take 1 Tablet by mouth every day.  Dispense: 7 Tablet; Refill: 0    2. Acute bronchitis, unspecified organism  - Acute and persistent. Reviewed recent Cxr and respiratory panel within normal limits. However given persistent symptoms and fevers discussed reasonable to treat with above given concern for concurrent UTI    3. Antibiotic-induced yeast infection  - fluconazole (DIFLUCAN) 150 MG tablet; Take one tablet orally for yeast infection, if symptoms persist, may repeat treatment after 72 hours.  Dispense: 2 Tablet; Refill: 0    ER/Call/Return precautions discussed. She verbalized understanding and agreed with plan.    Return if symptoms worsen or fail to improve.    Please note that this dictation was created using voice recognition software. I have made every reasonable attempt to correct obvious errors, but I expect that there are errors of grammar and possibly content that I did not discover before finalizing the note.

## 2023-12-30 ENCOUNTER — HOSPITAL ENCOUNTER (EMERGENCY)
Facility: MEDICAL CENTER | Age: 33
End: 2023-12-30
Attending: EMERGENCY MEDICINE
Payer: MEDICAID

## 2023-12-30 VITALS
DIASTOLIC BLOOD PRESSURE: 73 MMHG | WEIGHT: 112.88 LBS | BODY MASS INDEX: 18.81 KG/M2 | HEART RATE: 79 BPM | TEMPERATURE: 98.3 F | RESPIRATION RATE: 18 BRPM | HEIGHT: 65 IN | SYSTOLIC BLOOD PRESSURE: 122 MMHG | OXYGEN SATURATION: 97 %

## 2023-12-30 DIAGNOSIS — R10.9 LEFT FLANK PAIN: ICD-10-CM

## 2023-12-30 LAB
ALBUMIN SERPL BCP-MCNC: 4.3 G/DL (ref 3.2–4.9)
ALBUMIN/GLOB SERPL: 1.3 G/DL
ALP SERPL-CCNC: 51 U/L (ref 30–99)
ALT SERPL-CCNC: 15 U/L (ref 2–50)
ANION GAP SERPL CALC-SCNC: 14 MMOL/L (ref 7–16)
APPEARANCE UR: CLEAR
AST SERPL-CCNC: 15 U/L (ref 12–45)
BASOPHILS # BLD AUTO: 0.7 % (ref 0–1.8)
BASOPHILS # BLD: 0.06 K/UL (ref 0–0.12)
BILIRUB SERPL-MCNC: 0.3 MG/DL (ref 0.1–1.5)
BILIRUB UR QL STRIP.AUTO: NEGATIVE
BUN SERPL-MCNC: 12 MG/DL (ref 8–22)
CALCIUM ALBUM COR SERPL-MCNC: 9.1 MG/DL (ref 8.5–10.5)
CALCIUM SERPL-MCNC: 9.3 MG/DL (ref 8.5–10.5)
CHLORIDE SERPL-SCNC: 104 MMOL/L (ref 96–112)
CO2 SERPL-SCNC: 22 MMOL/L (ref 20–33)
COLOR UR: YELLOW
CREAT SERPL-MCNC: 0.68 MG/DL (ref 0.5–1.4)
EOSINOPHIL # BLD AUTO: 0.07 K/UL (ref 0–0.51)
EOSINOPHIL NFR BLD: 0.8 % (ref 0–6.9)
ERYTHROCYTE [DISTWIDTH] IN BLOOD BY AUTOMATED COUNT: 42.3 FL (ref 35.9–50)
GFR SERPLBLD CREATININE-BSD FMLA CKD-EPI: 116 ML/MIN/1.73 M 2
GLOBULIN SER CALC-MCNC: 3.3 G/DL (ref 1.9–3.5)
GLUCOSE SERPL-MCNC: 85 MG/DL (ref 65–99)
GLUCOSE UR STRIP.AUTO-MCNC: NEGATIVE MG/DL
HCG SERPL QL: NEGATIVE
HCT VFR BLD AUTO: 42.7 % (ref 37–47)
HGB BLD-MCNC: 14.4 G/DL (ref 12–16)
IMM GRANULOCYTES # BLD AUTO: 0.03 K/UL (ref 0–0.11)
IMM GRANULOCYTES NFR BLD AUTO: 0.4 % (ref 0–0.9)
KETONES UR STRIP.AUTO-MCNC: NEGATIVE MG/DL
LEUKOCYTE ESTERASE UR QL STRIP.AUTO: NEGATIVE
LIPASE SERPL-CCNC: 53 U/L (ref 11–82)
LYMPHOCYTES # BLD AUTO: 1.93 K/UL (ref 1–4.8)
LYMPHOCYTES NFR BLD: 23.3 % (ref 22–41)
MCH RBC QN AUTO: 31.2 PG (ref 27–33)
MCHC RBC AUTO-ENTMCNC: 33.7 G/DL (ref 32.2–35.5)
MCV RBC AUTO: 92.4 FL (ref 81.4–97.8)
MICRO URNS: NORMAL
MONOCYTES # BLD AUTO: 0.45 K/UL (ref 0–0.85)
MONOCYTES NFR BLD AUTO: 5.4 % (ref 0–13.4)
NEUTROPHILS # BLD AUTO: 5.74 K/UL (ref 1.82–7.42)
NEUTROPHILS NFR BLD: 69.4 % (ref 44–72)
NITRITE UR QL STRIP.AUTO: NEGATIVE
NRBC # BLD AUTO: 0 K/UL
NRBC BLD-RTO: 0 /100 WBC (ref 0–0.2)
PH UR STRIP.AUTO: 6.5 [PH] (ref 5–8)
PLATELET # BLD AUTO: 297 K/UL (ref 164–446)
PMV BLD AUTO: 11 FL (ref 9–12.9)
POTASSIUM SERPL-SCNC: 4 MMOL/L (ref 3.6–5.5)
PROT SERPL-MCNC: 7.6 G/DL (ref 6–8.2)
PROT UR QL STRIP: NEGATIVE MG/DL
RBC # BLD AUTO: 4.62 M/UL (ref 4.2–5.4)
RBC UR QL AUTO: NEGATIVE
SODIUM SERPL-SCNC: 140 MMOL/L (ref 135–145)
SP GR UR STRIP.AUTO: 1.02
UROBILINOGEN UR STRIP.AUTO-MCNC: 0.2 MG/DL
WBC # BLD AUTO: 8.3 K/UL (ref 4.8–10.8)

## 2023-12-30 PROCEDURE — 85025 COMPLETE CBC W/AUTO DIFF WBC: CPT

## 2023-12-30 PROCEDURE — 36415 COLL VENOUS BLD VENIPUNCTURE: CPT

## 2023-12-30 PROCEDURE — 84703 CHORIONIC GONADOTROPIN ASSAY: CPT

## 2023-12-30 PROCEDURE — 83690 ASSAY OF LIPASE: CPT

## 2023-12-30 PROCEDURE — 80053 COMPREHEN METABOLIC PANEL: CPT

## 2023-12-30 PROCEDURE — 99284 EMERGENCY DEPT VISIT MOD MDM: CPT

## 2023-12-30 PROCEDURE — 81003 URINALYSIS AUTO W/O SCOPE: CPT

## 2023-12-30 ASSESSMENT — FIBROSIS 4 INDEX: FIB4 SCORE: 0.78

## 2023-12-30 ASSESSMENT — PAIN DESCRIPTION - PAIN TYPE: TYPE: ACUTE PAIN

## 2023-12-31 PROBLEM — J06.9 UPPER RESPIRATORY TRACT INFECTION: Status: ACTIVE | Noted: 2023-12-31

## 2023-12-31 NOTE — ED TRIAGE NOTES
Chief Complaint   Patient presents with    Flank Pain     L sided. Pt reports  history of multiple UTI and kidney infections.      Pt ambulatory to triage for above complaint. Pt reports she got a UTI 5 days ago and that she got a kidney infection starting yesterday. Pt denies any painful urination but reports smell, and cloudy characteristics.

## 2023-12-31 NOTE — ED NOTES
Patient has been provided written and verbal education on flank pain and when to return to ED for worsening s/s including fever, chills, fainting, increased pain. She is ambulatory on DC.

## 2023-12-31 NOTE — ED PROVIDER NOTES
"ED Provider Note    CHIEF COMPLAINT  Chief Complaint   Patient presents with    Flank Pain     L sided. Pt reports  history of multiple UTI and kidney infections.        EXTERNAL RECORDS REVIEWED  12/26/2023, outpatient office visit, initiation of empiric Levaquin for UTI and bronchitis    HPI/ROS    Aminata Lorenzana is a 33 y.o. female who presents for evaluation of ongoing left-sided flank pain.  She reports a history of reoccurring \"kidney infections\" and went to her PCP where she was prescribed Levaquin a couple days ago.  She states her symptoms have not improved.  She only has a flank pain, no burning or blood with urination, she is been nauseated since taking the antibiotics.  No fever.    PAST MEDICAL HISTORY   has a past medical history of Migraine with aura and Stroke ([facility]) (2017).    SURGICAL HISTORY  patient denies any surgical history    FAMILY HISTORY  Family History   Problem Relation Age of Onset    Stroke Father     Diabetes Maternal Grandmother     Diabetes Paternal Grandfather        SOCIAL HISTORY  Social History     Tobacco Use    Smoking status: Never    Smokeless tobacco: Never   Vaping Use    Vaping Use: Never used   Substance and Sexual Activity    Alcohol use: Not Currently     Comment: ocass    Drug use: Yes     Types: Marijuana     Comment: ocass    Sexual activity: Yes     Partners: Male     Birth control/protection: Pill       CURRENT MEDICATIONS  Home Medications       Reviewed by Dee Vasquez R.N. (Registered Nurse) on 12/30/23 at 1625  Med List Status: Not Addressed     Medication Last Dose Status   albuterol 108 (90 Base) MCG/ACT Aero Soln inhalation aerosol  Active   benzonatate (TESSALON) 100 MG Cap  Active   fluconazole (DIFLUCAN) 150 MG tablet  Active   guaiFENesin ER 1200 MG TABLET SR 12 HR  Active   ipratropium-albuterol (DUONEB) 0.5-2.5 (3) MG/3ML nebulizer solution  Active   levoFLOXacin (LEVAQUIN) 750 MG tablet  Active   levonorgestrel-ethinyl estradiol (AVIANE) " "0.1-20 MG-MCG per tablet  Active   Probiotic, Lactobacillus, Cap  Active   SUMAtriptan (IMITREX) 50 MG Tab  Active                    ALLERGIES  Allergies   Allergen Reactions    [place] (Diagnostic) Anxiety, Hives, Itching, Rash, Shortness of Breath and Swelling     Other reaction(s): Unspecified    [place] Meal Unspecified    Camphor      Other reaction(s): Unspecified    Cinnamon Anxiety, Hives, Itching, Rash, Shortness of Breath and Swelling       PHYSICAL EXAM  VITAL SIGNS: /73   Pulse 79   Temp 36.8 °C (98.3 °F) (Temporal)   Resp 18   Ht 1.651 m (5' 5\")   Wt 51.2 kg (112 lb 14 oz)   LMP 10/16/2023   SpO2 97%   BMI 18.78 kg/m²    Constitutional: Well appearing patient in no acute distress.  Awake and alert, not toxic nor ill in appearance.  HENT: Normocephalic, no obvious evidence of acute trauma.  Eyes: No scleral icterus. Normal conjunctiva   Thorax & Lungs: Normal nonlabored respirations. I appreciate no wheezing, rhonchi or rales. There is normal air movement.  Upon cardiac ascultation I appreciate a regular heart rhythm and a normal rate.   Abdomen: The abdomen is not visibly distended. Upon palpation, I find it to be without tenderness.  No mass appreciated.  Tenderness to palpation involving the region of the left flank  Skin: The exposed portions of skin reveal no obvious rash or other abnormalities.      DIAGNOSTIC STUDIES / PROCEDURES    LABS  Results for orders placed or performed during the hospital encounter of 12/30/23   CBC WITH DIFFERENTIAL   Result Value Ref Range    WBC 8.3 4.8 - 10.8 K/uL    RBC 4.62 4.20 - 5.40 M/uL    Hemoglobin 14.4 12.0 - 16.0 g/dL    Hematocrit 42.7 37.0 - 47.0 %    MCV 92.4 81.4 - 97.8 fL    MCH 31.2 27.0 - 33.0 pg    MCHC 33.7 32.2 - 35.5 g/dL    RDW 42.3 35.9 - 50.0 fL    Platelet Count 297 164 - 446 K/uL    MPV 11.0 9.0 - 12.9 fL    Neutrophils-Polys 69.40 44.00 - 72.00 %    Lymphocytes 23.30 22.00 - 41.00 %    Monocytes 5.40 0.00 - 13.40 %    Eosinophils " 0.80 0.00 - 6.90 %    Basophils 0.70 0.00 - 1.80 %    Immature Granulocytes 0.40 0.00 - 0.90 %    Nucleated RBC 0.00 0.00 - 0.20 /100 WBC    Neutrophils (Absolute) 5.74 1.82 - 7.42 K/uL    Lymphs (Absolute) 1.93 1.00 - 4.80 K/uL    Monos (Absolute) 0.45 0.00 - 0.85 K/uL    Eos (Absolute) 0.07 0.00 - 0.51 K/uL    Baso (Absolute) 0.06 0.00 - 0.12 K/uL    Immature Granulocytes (abs) 0.03 0.00 - 0.11 K/uL    NRBC (Absolute) 0.00 K/uL   COMP METABOLIC PANEL   Result Value Ref Range    Sodium 140 135 - 145 mmol/L    Potassium 4.0 3.6 - 5.5 mmol/L    Chloride 104 96 - 112 mmol/L    Co2 22 20 - 33 mmol/L    Anion Gap 14.0 7.0 - 16.0    Glucose 85 65 - 99 mg/dL    Bun 12 8 - 22 mg/dL    Creatinine 0.68 0.50 - 1.40 mg/dL    Calcium 9.3 8.5 - 10.5 mg/dL    Correct Calcium 9.1 8.5 - 10.5 mg/dL    AST(SGOT) 15 12 - 45 U/L    ALT(SGPT) 15 2 - 50 U/L    Alkaline Phosphatase 51 30 - 99 U/L    Total Bilirubin 0.3 0.1 - 1.5 mg/dL    Albumin 4.3 3.2 - 4.9 g/dL    Total Protein 7.6 6.0 - 8.2 g/dL    Globulin 3.3 1.9 - 3.5 g/dL    A-G Ratio 1.3 g/dL   LIPASE   Result Value Ref Range    Lipase 53 11 - 82 U/L   HCG QUAL SERUM   Result Value Ref Range    Beta-Hcg Qualitative Serum Negative Negative   URINALYSIS,CULTURE IF INDICATED    Specimen: Urine   Result Value Ref Range    Color Yellow     Character Clear     Specific Gravity 1.023 <1.035    Ph 6.5 5.0 - 8.0    Glucose Negative Negative mg/dL    Ketones Negative Negative mg/dL    Protein Negative Negative mg/dL    Bilirubin Negative Negative    Urobilinogen, Urine 0.2 Negative    Nitrite Negative Negative    Leukocyte Esterase Negative Negative    Occult Blood Negative Negative    Micro Urine Req see below    ESTIMATED GFR   Result Value Ref Range    GFR (CKD-EPI) 116 >60 mL/min/1.73 m 2        COURSE & MEDICAL DECISION MAKING    ED Observation Status? No; Patient does not meet criteria for ED Observation.     INITIAL ASSESSMENT, COURSE AND PLAN  Care Narrative: 33-year-old female  with left flank pain that is reproducible on exam, 4 days ago started on an empiric course of Levaquin for UTI and bronchitis.  She otherwise has a benign examination.  Her vital signs are within normal limits, she did have some initial tachycardia but that resolved.  Her blood work is stone cold normal.  No leukocytosis.  Urinalysis is completely normal.  At this point, no further antibiotics are indicated.  No indication for renal imaging given the reproducibility of her flank pain on palpation.  She be discharged home in stable condition with strict return instructions to follow-up with a primary care physician    DISPOSITION AND DISCUSSIONS    Decision tools and prescription drugs considered including, but not limited to: I did consider a prescription for analgesia although at this point I think she can stick to over-the-counter analgesics.    FINAL DIAGNOSIS  1. Left flank pain           Electronically signed by: Luis Carlos Byrne M.D., 12/30/2023 6:47 PM

## 2024-01-01 NOTE — PROGRESS NOTES
Chief Complaint   Patient presents with    Runny Nose    Cough    Fever    Medication Refill       Subjective:     HPI:   Aminata Lorenzana is a 33 y.o. female here to discuss the evaluation and management of:      Problem   Upper Respiratory Tract Infection    Patient here with Uri symptoms. Complaints of runny nose, congestion, fever, body aches, and cough. She is concerned that she may be developing Pneumonia. These symptoms have been going on for a few days.          ROS  See HPI     Allergies   Allergen Reactions    [place] (Diagnostic) Anxiety, Hives, Itching, Rash, Shortness of Breath and Swelling     Other reaction(s): Unspecified    [place] Meal Unspecified    Camphor      Other reaction(s): Unspecified    [place] Anxiety, Hives, Itching, Rash, Shortness of Breath and Swelling       Current medicines (including changes today)  Current Outpatient Medications   Medication Sig Dispense Refill    ipratropium-albuterol (DUONEB) 0.5-2.5 (3) MG/3ML nebulizer solution Take 3 mL by nebulization every 6 hours as needed for Shortness of Breath. 50 Each 1    guaiFENesin ER 1200 MG TABLET SR 12 HR Take 1 Tablet by mouth every 12 hours. 20 Tablet 0    benzonatate (TESSALON) 100 MG Cap Take 1 Capsule by mouth 2 times a day as needed for Cough. 20 Capsule 0    albuterol 108 (90 Base) MCG/ACT Aero Soln inhalation aerosol Inhale 2 Puffs every 6 hours as needed for Shortness of Breath. 8.5 g 1    levonorgestrel-ethinyl estradiol (AVIANE) 0.1-20 MG-MCG per tablet Take 1 Tablet by mouth every day. 28 Tablet 5    fluconazole (DIFLUCAN) 150 MG tablet Take one tablet orally for yeast infection, if symptoms persist, may repeat treatment after 72 hours. 2 Tablet 0    levoFLOXacin (LEVAQUIN) 750 MG tablet Take 1 Tablet by mouth every day. 7 Tablet 0    Probiotic, Lactobacillus, Cap Probiotic      SUMAtriptan (IMITREX) 50 MG Tab Take 1 Tablet by mouth one time as needed for Migraine for up to 1 dose. May repeat dose one time after 2  "hours 10 Tablet 3     No current facility-administered medications for this visit.       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    Migraine with aura and without status migrainosus, not intractable 05/16/2023    Menorrhagia with regular cycle 05/16/2023    Painful menstrual periods 05/16/2023    Neck pain 04/26/2023    UTI symptoms 04/26/2023    Encounter to establish care 04/26/2023       Family History   Problem Relation Age of Onset    Stroke Father     Diabetes Maternal Grandmother     Diabetes Paternal Grandfather           Objective:     /60 (BP Location: Left arm, Patient Position: Sitting, BP Cuff Size: Adult)   Pulse (!) 113   Temp 37.2 °C (99 °F) (Temporal)   Resp 16   Ht 1.651 m (5' 5\")   Wt 52.3 kg (115 lb 6.4 oz)   SpO2 97%  Body mass index is 19.2 kg/m².    Physical Exam:  Physical Exam  Vitals reviewed.   Constitutional:       General: She is awake.      Appearance: Normal appearance. She is well-developed.   HENT:      Head: Normocephalic.      Nose: Congestion present.      Mouth/Throat:      Mouth: Mucous membranes are moist.      Pharynx: Posterior oropharyngeal erythema present. No oropharyngeal exudate.   Eyes:      Conjunctiva/sclera: Conjunctivae normal.   Cardiovascular:      Rate and Rhythm: Normal rate and regular rhythm.      Heart sounds: Normal heart sounds.   Pulmonary:      Effort: Pulmonary effort is normal. No respiratory distress.      Breath sounds: Normal breath sounds. No wheezing or rhonchi.   Musculoskeletal:      Cervical back: Neck supple.   Skin:     General: Skin is warm and dry.   Neurological:      Mental Status: She " is alert and oriented to person, place, and time.   Psychiatric:         Mood and Affect: Mood normal.         Behavior: Behavior normal. Behavior is cooperative.              Assessment and Plan:     The following treatment plan was discussed:    Problem List Items Addressed This Visit       Upper respiratory tract infection     Acute-  Discussed with patient that her symptoms are likely viral   Covid/ Flu/ and Rsv swab negative in clinic.   DX chest 2 view imaging - if suggestive of pneumonia will treat with antibiotics.   Discussed with patient that we will hold off on antibiotics at this time and treat symptoms as it is likely viral   Patient provided tessalon to help with cough at night.   Encouraged to use mucinex and stay well hydrated          Relevant Medications    ipratropium-albuterol (DUONEB) 0.5-2.5 (3) MG/3ML nebulizer solution    guaiFENesin ER 1200 MG TABLET SR 12 HR    albuterol 108 (90 Base) MCG/ACT Aero Soln inhalation aerosol    Other Relevant Orders    DX-CHEST-2 VIEWS (Completed)     Other Visit Diagnoses       Runny nose        Relevant Orders    POCT Cepheid CoV-2, Flu A/B, RSV - PCR (Completed)    Cough, unspecified type        Relevant Medications    benzonatate (TESSALON) 100 MG Cap    Other Relevant Orders    POCT Cepheid CoV-2, Flu A/B, RSV - PCR (Completed)    Fever, unspecified fever cause        Relevant Orders    POCT Cepheid CoV-2, Flu A/B, RSV - PCR (Completed)    Body aches        Relevant Orders    POCT Cepheid CoV-2, Flu A/B, RSV - PCR (Completed)    Earache        Relevant Orders    POCT Cepheid CoV-2, Flu A/B, RSV - PCR (Completed)            Any change or worsening of signs or symptoms, patient encouraged to follow-up or report to emergency room for further evaluation. Patient verbalizes understanding and agrees.    Follow-Up: Follow up as needed       PLEASE NOTE: This dictation was created using voice recognition software. I have made every reasonable attempt to correct  obvious errors, but I expect that there are errors of grammar and possibly content that I did not discover before finalizing the note.

## 2024-01-01 NOTE — ASSESSMENT & PLAN NOTE
Acute-  Discussed with patient that her symptoms are likely viral   Covid/ Flu/ and Rsv swab negative in clinic.   DX chest 2 view imaging - if suggestive of pneumonia will treat with antibiotics.   Discussed with patient that we will hold off on antibiotics at this time and treat symptoms as it is likely viral   Patient provided tessalon to help with cough at night.   Encouraged to use mucinex and stay well hydrated

## 2024-01-08 ENCOUNTER — PATIENT MESSAGE (OUTPATIENT)
Dept: OBGYN | Facility: CLINIC | Age: 34
End: 2024-01-08
Payer: MEDICAID

## 2024-01-08 DIAGNOSIS — N92.0 MENORRHAGIA WITH REGULAR CYCLE: ICD-10-CM

## 2024-01-08 DIAGNOSIS — Z30.41 ENCOUNTER FOR SURVEILLANCE OF CONTRACEPTIVE PILLS: ICD-10-CM

## 2024-01-08 DIAGNOSIS — N94.6 PAINFUL MENSTRUAL PERIODS: ICD-10-CM

## 2024-01-09 RX ORDER — LEVONORGESTREL AND ETHINYL ESTRADIOL 0.1-0.02MG
1 KIT ORAL DAILY
Qty: 84 TABLET | Refills: 5 | Status: SHIPPED | OUTPATIENT
Start: 2024-01-09 | End: 2024-02-10 | Stop reason: SDUPTHER

## 2024-01-16 ENCOUNTER — OFFICE VISIT (OUTPATIENT)
Dept: MEDICAL GROUP | Facility: MEDICAL CENTER | Age: 34
DRG: 193 | End: 2024-01-16
Attending: FAMILY MEDICINE
Payer: MEDICAID

## 2024-01-16 ENCOUNTER — HOSPITAL ENCOUNTER (INPATIENT)
Facility: MEDICAL CENTER | Age: 34
LOS: 2 days | DRG: 193 | End: 2024-01-18
Attending: EMERGENCY MEDICINE | Admitting: STUDENT IN AN ORGANIZED HEALTH CARE EDUCATION/TRAINING PROGRAM
Payer: MEDICAID

## 2024-01-16 ENCOUNTER — APPOINTMENT (OUTPATIENT)
Dept: RADIOLOGY | Facility: MEDICAL CENTER | Age: 34
DRG: 193 | End: 2024-01-16
Attending: EMERGENCY MEDICINE
Payer: MEDICAID

## 2024-01-16 VITALS
DIASTOLIC BLOOD PRESSURE: 78 MMHG | BODY MASS INDEX: 18.89 KG/M2 | HEART RATE: 156 BPM | WEIGHT: 113.4 LBS | OXYGEN SATURATION: 96 % | TEMPERATURE: 99.7 F | SYSTOLIC BLOOD PRESSURE: 140 MMHG | RESPIRATION RATE: 20 BRPM | HEIGHT: 65 IN

## 2024-01-16 DIAGNOSIS — R00.0 TACHYCARDIA: ICD-10-CM

## 2024-01-16 DIAGNOSIS — J18.9 PNEUMONIA DUE TO INFECTIOUS ORGANISM, UNSPECIFIED LATERALITY, UNSPECIFIED PART OF LUNG: ICD-10-CM

## 2024-01-16 DIAGNOSIS — J45.901 MILD ASTHMA WITH ACUTE EXACERBATION, UNSPECIFIED WHETHER PERSISTENT: ICD-10-CM

## 2024-01-16 DIAGNOSIS — J06.9 UPPER RESPIRATORY TRACT INFECTION, UNSPECIFIED TYPE: ICD-10-CM

## 2024-01-16 DIAGNOSIS — J21.0 RSV (ACUTE BRONCHIOLITIS DUE TO RESPIRATORY SYNCYTIAL VIRUS): ICD-10-CM

## 2024-01-16 LAB
ALBUMIN SERPL BCP-MCNC: 4.3 G/DL (ref 3.2–4.9)
ALBUMIN/GLOB SERPL: 1.1 G/DL
ALP SERPL-CCNC: 68 U/L (ref 30–99)
ALT SERPL-CCNC: 6 U/L (ref 2–50)
ANION GAP SERPL CALC-SCNC: 17 MMOL/L (ref 7–16)
APPEARANCE UR: CLEAR
AST SERPL-CCNC: 15 U/L (ref 12–45)
BACTERIA #/AREA URNS HPF: ABNORMAL /HPF
BASOPHILS # BLD AUTO: 0.5 % (ref 0–1.8)
BASOPHILS # BLD: 0.06 K/UL (ref 0–0.12)
BILIRUB SERPL-MCNC: 0.6 MG/DL (ref 0.1–1.5)
BILIRUB UR QL STRIP.AUTO: NEGATIVE
BUN SERPL-MCNC: 4 MG/DL (ref 8–22)
CALCIUM ALBUM COR SERPL-MCNC: 9.2 MG/DL (ref 8.5–10.5)
CALCIUM SERPL-MCNC: 9.4 MG/DL (ref 8.5–10.5)
CHLORIDE SERPL-SCNC: 100 MMOL/L (ref 96–112)
CO2 SERPL-SCNC: 19 MMOL/L (ref 20–33)
COLOR UR: YELLOW
CREAT SERPL-MCNC: 0.76 MG/DL (ref 0.5–1.4)
D DIMER PPP IA.FEU-MCNC: 1.39 UG/ML (FEU) (ref 0–0.5)
EKG IMPRESSION: NORMAL
EOSINOPHIL # BLD AUTO: 0.04 K/UL (ref 0–0.51)
EOSINOPHIL NFR BLD: 0.3 % (ref 0–6.9)
EPI CELLS #/AREA URNS HPF: ABNORMAL /HPF
ERYTHROCYTE [DISTWIDTH] IN BLOOD BY AUTOMATED COUNT: 43.3 FL (ref 35.9–50)
FLUAV RNA SPEC QL NAA+PROBE: NEGATIVE
FLUAV RNA SPEC QL NAA+PROBE: NEGATIVE
FLUBV RNA SPEC QL NAA+PROBE: NEGATIVE
FLUBV RNA SPEC QL NAA+PROBE: NEGATIVE
GFR SERPLBLD CREATININE-BSD FMLA CKD-EPI: 104 ML/MIN/1.73 M 2
GLOBULIN SER CALC-MCNC: 4 G/DL (ref 1.9–3.5)
GLUCOSE SERPL-MCNC: 99 MG/DL (ref 65–99)
GLUCOSE UR STRIP.AUTO-MCNC: NEGATIVE MG/DL
HCG SERPL QL: NEGATIVE
HCT VFR BLD AUTO: 43.9 % (ref 37–47)
HGB BLD-MCNC: 15.2 G/DL (ref 12–16)
HYALINE CASTS #/AREA URNS LPF: ABNORMAL /LPF
IMM GRANULOCYTES # BLD AUTO: 0.03 K/UL (ref 0–0.11)
IMM GRANULOCYTES NFR BLD AUTO: 0.2 % (ref 0–0.9)
KETONES UR STRIP.AUTO-MCNC: ABNORMAL MG/DL
LACTATE SERPL-SCNC: 1.9 MMOL/L (ref 0.5–2)
LACTATE SERPL-SCNC: 2.3 MMOL/L (ref 0.5–2)
LEUKOCYTE ESTERASE UR QL STRIP.AUTO: ABNORMAL
LYMPHOCYTES # BLD AUTO: 0.66 K/UL (ref 1–4.8)
LYMPHOCYTES NFR BLD: 5.5 % (ref 22–41)
MAGNESIUM SERPL-MCNC: 1.7 MG/DL (ref 1.5–2.5)
MCH RBC QN AUTO: 31.9 PG (ref 27–33)
MCHC RBC AUTO-ENTMCNC: 34.6 G/DL (ref 32.2–35.5)
MCV RBC AUTO: 92.2 FL (ref 81.4–97.8)
MICRO URNS: ABNORMAL
MONOCYTES # BLD AUTO: 0.47 K/UL (ref 0–0.85)
MONOCYTES NFR BLD AUTO: 3.9 % (ref 0–13.4)
NEUTROPHILS # BLD AUTO: 10.81 K/UL (ref 1.82–7.42)
NEUTROPHILS NFR BLD: 89.6 % (ref 44–72)
NITRITE UR QL STRIP.AUTO: NEGATIVE
NRBC # BLD AUTO: 0 K/UL
NRBC BLD-RTO: 0 /100 WBC (ref 0–0.2)
PH UR STRIP.AUTO: 7 [PH] (ref 5–8)
PLATELET # BLD AUTO: 163 K/UL (ref 164–446)
PMV BLD AUTO: 11.4 FL (ref 9–12.9)
POTASSIUM SERPL-SCNC: 3.4 MMOL/L (ref 3.6–5.5)
PROCALCITONIN SERPL-MCNC: 0.06 NG/ML
PROT SERPL-MCNC: 8.3 G/DL (ref 6–8.2)
PROT UR QL STRIP: NEGATIVE MG/DL
RBC # BLD AUTO: 4.76 M/UL (ref 4.2–5.4)
RBC # URNS HPF: ABNORMAL /HPF
RBC UR QL AUTO: NEGATIVE
RSV RNA SPEC QL NAA+PROBE: POSITIVE
RSV RNA SPEC QL NAA+PROBE: POSITIVE
SARS-COV-2 RNA RESP QL NAA+PROBE: NEGATIVE
SARS-COV-2 RNA RESP QL NAA+PROBE: NOTDETECTED
SODIUM SERPL-SCNC: 136 MMOL/L (ref 135–145)
SP GR UR STRIP.AUTO: 1.02
TROPONIN T SERPL-MCNC: <6 NG/L (ref 6–19)
TROPONIN T SERPL-MCNC: <6 NG/L (ref 6–19)
UROBILINOGEN UR STRIP.AUTO-MCNC: 0.2 MG/DL
WBC # BLD AUTO: 12.1 K/UL (ref 4.8–10.8)
WBC #/AREA URNS HPF: ABNORMAL /HPF

## 2024-01-16 PROCEDURE — 36415 COLL VENOUS BLD VENIPUNCTURE: CPT

## 2024-01-16 PROCEDURE — 84145 PROCALCITONIN (PCT): CPT

## 2024-01-16 PROCEDURE — 700117 HCHG RX CONTRAST REV CODE 255: Performed by: EMERGENCY MEDICINE

## 2024-01-16 PROCEDURE — 3077F SYST BP >= 140 MM HG: CPT | Performed by: FAMILY MEDICINE

## 2024-01-16 PROCEDURE — 93005 ELECTROCARDIOGRAM TRACING: CPT | Performed by: EMERGENCY MEDICINE

## 2024-01-16 PROCEDURE — 99214 OFFICE O/P EST MOD 30 MIN: CPT | Performed by: FAMILY MEDICINE

## 2024-01-16 PROCEDURE — 700101 HCHG RX REV CODE 250: Mod: UD | Performed by: EMERGENCY MEDICINE

## 2024-01-16 PROCEDURE — 87205 SMEAR GRAM STAIN: CPT

## 2024-01-16 PROCEDURE — 700105 HCHG RX REV CODE 258: Performed by: STUDENT IN AN ORGANIZED HEALTH CARE EDUCATION/TRAINING PROGRAM

## 2024-01-16 PROCEDURE — 87070 CULTURE OTHR SPECIMN AEROBIC: CPT

## 2024-01-16 PROCEDURE — 85025 COMPLETE CBC W/AUTO DIFF WBC: CPT

## 2024-01-16 PROCEDURE — 94640 AIRWAY INHALATION TREATMENT: CPT

## 2024-01-16 PROCEDURE — 770020 HCHG ROOM/CARE - TELE (206)

## 2024-01-16 PROCEDURE — 84703 CHORIONIC GONADOTROPIN ASSAY: CPT

## 2024-01-16 PROCEDURE — 99213 OFFICE O/P EST LOW 20 MIN: CPT | Performed by: FAMILY MEDICINE

## 2024-01-16 PROCEDURE — 81001 URINALYSIS AUTO W/SCOPE: CPT

## 2024-01-16 PROCEDURE — 84484 ASSAY OF TROPONIN QUANT: CPT

## 2024-01-16 PROCEDURE — 80053 COMPREHEN METABOLIC PANEL: CPT

## 2024-01-16 PROCEDURE — 0241U HCHG SARS-COV-2 COVID-19 NFCT DS RESP RNA 4 TRGT ED POC: CPT

## 2024-01-16 PROCEDURE — 0241U POCT CEPHEID COV-2, FLU A/B, RSV - PCR: CPT | Performed by: FAMILY MEDICINE

## 2024-01-16 PROCEDURE — 99285 EMERGENCY DEPT VISIT HI MDM: CPT

## 2024-01-16 PROCEDURE — A9270 NON-COVERED ITEM OR SERVICE: HCPCS | Mod: UD | Performed by: EMERGENCY MEDICINE

## 2024-01-16 PROCEDURE — 93005 ELECTROCARDIOGRAM TRACING: CPT

## 2024-01-16 PROCEDURE — 700105 HCHG RX REV CODE 258: Mod: UD | Performed by: EMERGENCY MEDICINE

## 2024-01-16 PROCEDURE — 87040 BLOOD CULTURE FOR BACTERIA: CPT | Mod: 91

## 2024-01-16 PROCEDURE — 83605 ASSAY OF LACTIC ACID: CPT

## 2024-01-16 PROCEDURE — 700111 HCHG RX REV CODE 636 W/ 250 OVERRIDE (IP): Mod: JZ,UD | Performed by: EMERGENCY MEDICINE

## 2024-01-16 PROCEDURE — 71045 X-RAY EXAM CHEST 1 VIEW: CPT

## 2024-01-16 PROCEDURE — 96365 THER/PROPH/DIAG IV INF INIT: CPT

## 2024-01-16 PROCEDURE — 700102 HCHG RX REV CODE 250 W/ 637 OVERRIDE(OP): Mod: UD | Performed by: EMERGENCY MEDICINE

## 2024-01-16 PROCEDURE — 83735 ASSAY OF MAGNESIUM: CPT

## 2024-01-16 PROCEDURE — 8E0ZXY6 ISOLATION: ICD-10-PCS | Performed by: EMERGENCY MEDICINE

## 2024-01-16 PROCEDURE — 87086 URINE CULTURE/COLONY COUNT: CPT

## 2024-01-16 PROCEDURE — 71275 CT ANGIOGRAPHY CHEST: CPT

## 2024-01-16 PROCEDURE — 85379 FIBRIN DEGRADATION QUANT: CPT

## 2024-01-16 PROCEDURE — 3078F DIAST BP <80 MM HG: CPT | Performed by: FAMILY MEDICINE

## 2024-01-16 RX ORDER — ENOXAPARIN SODIUM 100 MG/ML
40 INJECTION SUBCUTANEOUS DAILY
Status: DISCONTINUED | OUTPATIENT
Start: 2024-01-16 | End: 2024-01-18 | Stop reason: HOSPADM

## 2024-01-16 RX ORDER — SODIUM CHLORIDE, SODIUM LACTATE, POTASSIUM CHLORIDE, CALCIUM CHLORIDE 600; 310; 30; 20 MG/100ML; MG/100ML; MG/100ML; MG/100ML
INJECTION, SOLUTION INTRAVENOUS CONTINUOUS
Status: DISCONTINUED | OUTPATIENT
Start: 2024-01-16 | End: 2024-01-18 | Stop reason: HOSPADM

## 2024-01-16 RX ORDER — SODIUM CHLORIDE, SODIUM LACTATE, POTASSIUM CHLORIDE, AND CALCIUM CHLORIDE .6; .31; .03; .02 G/100ML; G/100ML; G/100ML; G/100ML
30 INJECTION, SOLUTION INTRAVENOUS ONCE
Status: COMPLETED | OUTPATIENT
Start: 2024-01-16 | End: 2024-01-16

## 2024-01-16 RX ORDER — AZITHROMYCIN 250 MG/1
500 TABLET, FILM COATED ORAL ONCE
Status: COMPLETED | OUTPATIENT
Start: 2024-01-16 | End: 2024-01-16

## 2024-01-16 RX ORDER — IBUPROFEN 800 MG/1
800 TABLET ORAL ONCE
Status: COMPLETED | OUTPATIENT
Start: 2024-01-16 | End: 2024-01-16

## 2024-01-16 RX ORDER — LEVALBUTEROL INHALATION SOLUTION 1.25 MG/3ML
1.25 SOLUTION RESPIRATORY (INHALATION)
Status: COMPLETED | OUTPATIENT
Start: 2024-01-16 | End: 2024-01-16

## 2024-01-16 RX ADMIN — SODIUM CHLORIDE, POTASSIUM CHLORIDE, SODIUM LACTATE AND CALCIUM CHLORIDE 1542 ML: 600; 310; 30; 20 INJECTION, SOLUTION INTRAVENOUS at 17:57

## 2024-01-16 RX ADMIN — LEVALBUTEROL 1.25 MG: 1.25 SOLUTION RESPIRATORY (INHALATION) at 18:56

## 2024-01-16 RX ADMIN — SODIUM CHLORIDE, POTASSIUM CHLORIDE, SODIUM LACTATE AND CALCIUM CHLORIDE: 600; 310; 30; 20 INJECTION, SOLUTION INTRAVENOUS at 23:06

## 2024-01-16 RX ADMIN — IBUPROFEN 800 MG: 800 TABLET, FILM COATED ORAL at 17:55

## 2024-01-16 RX ADMIN — AZITHROMYCIN DIHYDRATE 500 MG: 250 TABLET, FILM COATED ORAL at 21:10

## 2024-01-16 RX ADMIN — AMPICILLIN AND SULBACTAM 3 G: 1; 2 INJECTION, POWDER, FOR SOLUTION INTRAMUSCULAR; INTRAVENOUS at 21:12

## 2024-01-16 RX ADMIN — IOHEXOL 60 ML: 350 INJECTION, SOLUTION INTRAVENOUS at 20:00

## 2024-01-16 ASSESSMENT — PATIENT HEALTH QUESTIONNAIRE - PHQ9
SUM OF ALL RESPONSES TO PHQ9 QUESTIONS 1 AND 2: 0
1. LITTLE INTEREST OR PLEASURE IN DOING THINGS: NOT AT ALL
2. FEELING DOWN, DEPRESSED, IRRITABLE, OR HOPELESS: NOT AT ALL
CLINICAL INTERPRETATION OF PHQ2 SCORE: 0

## 2024-01-16 ASSESSMENT — LIFESTYLE VARIABLES
EVER HAD A DRINK FIRST THING IN THE MORNING TO STEADY YOUR NERVES TO GET RID OF A HANGOVER: NO
CONSUMPTION TOTAL: NEGATIVE
EVER FELT BAD OR GUILTY ABOUT YOUR DRINKING: NO
TOTAL SCORE: 0
ALCOHOL_USE: NO
TOTAL SCORE: 0
AVERAGE NUMBER OF DAYS PER WEEK YOU HAVE A DRINK CONTAINING ALCOHOL: 0
HAVE PEOPLE ANNOYED YOU BY CRITICIZING YOUR DRINKING: NO
ON A TYPICAL DAY WHEN YOU DRINK ALCOHOL HOW MANY DRINKS DO YOU HAVE: 0
HAVE YOU EVER FELT YOU SHOULD CUT DOWN ON YOUR DRINKING: NO
HOW MANY TIMES IN THE PAST YEAR HAVE YOU HAD 5 OR MORE DRINKS IN A DAY: 0
TOTAL SCORE: 0

## 2024-01-16 ASSESSMENT — COGNITIVE AND FUNCTIONAL STATUS - GENERAL
DAILY ACTIVITIY SCORE: 24
SUGGESTED CMS G CODE MODIFIER DAILY ACTIVITY: CH
SUGGESTED CMS G CODE MODIFIER MOBILITY: CH
MOBILITY SCORE: 24

## 2024-01-16 ASSESSMENT — FIBROSIS 4 INDEX
FIB4 SCORE: 1.35
FIB4 SCORE: 0.47
FIB4 SCORE: 0.47

## 2024-01-16 NOTE — ED TRIAGE NOTES
Chief Complaint   Patient presents with    Chest Pain     PT reports she had acute onset of CP yesterday evening, PT reports the CP + SOB has been increasing in intensity since yesterday. PT denies cardiac hx. PT reports she has had flu like symptom x 3 weeks prior to onset of severe CP. Hx of pneumonia leading to hospitalization in past.      PT ambulatory to triage for above complaint. GCS 15.     Pt is alert and oriented, speaking in full sentences, follows commands and responds appropriately to questions.     Pt placed in lobby. Pt educated on triage process. Pt encouraged to alert staff for any changes.     Patient and staff wearing appropriate PPE

## 2024-01-16 NOTE — PROGRESS NOTES
"Subjective   No chief complaint on file.       HPI:   Aminata presents today with partner Jung for persistent URI symptoms.  Patient reports subjective fever, heart racing  Feels like she can't catch a breath.  Endorses nasal congestion.  Previously was seen a few weeks ago with similar symptoms and also treated empirically for UTI  Completed course of Levaquin for treatment of UTI in setting of bronchitis  Reports symptoms have essentially persisted since previous visit and is acutely worsening in past few days  Has been taking Nyquil but feels like it causes  Endorses nausea but no vomiting.  Took a home covid test that was negative        Health Maintenance Due   Topic Date Due    HIV Screening  Never done    Hepatitis B Vaccine (Hep B) (1 of 3 - 3-dose series) Never done    Hepatitis C Screening  Never done    COVID-19 Vaccine (1) Never done    Chickenpox Vaccine (Varicella) (1 of 2 - 2-dose childhood series) Never done    Influenza Vaccine (1) 09/01/2023       Objective   Social History     Tobacco Use    Smoking status: Never    Smokeless tobacco: Never   Vaping Use    Vaping Use: Never used   Substance Use Topics    Alcohol use: Not Currently     Comment: ocass    Drug use: Yes     Types: Marijuana     Comment: ocass       Exam:  BP (!) 140/78   Pulse (!) 156   Temp 37.6 °C (99.7 °F)   Resp 20   Ht 1.651 m (5' 5\")   Wt 51.4 kg (113 lb 6.4 oz)   LMP 10/16/2023   SpO2 96%   BMI 18.87 kg/m²     Physical Exam  Constitutional: Alert, acutely ill  Skin: diaphretic  Eye: Conjunctiva clear, lids normal  Respiratory: Increased respiratory effort, some conversational dyspnea  CV: tachycardic, regular rhythm  MSK: Normal gait, moves all extremities  Psych: Alert and oriented x3, restless    Allergies   Allergen Reactions    [place] (Diagnostic) Anxiety, Hives, Itching, Rash, Shortness of Breath and Swelling     Other reaction(s): Unspecified    [place] Meal Unspecified    Camphor      Other reaction(s): Unspecified "    Cinnamon Anxiety, Hives, Itching, Rash, Shortness of Breath and Swelling       [id number] - [place], [place] - [address]  [address] [place] [address]  Phone: [phone] Fax: [phone]    Current Outpatient Medications   Medication Sig Dispense Refill    levonorgestrel-ethinyl estradiol (AVIANE) 0.1-20 MG-MCG per tablet Take 1 Tablet by mouth every day. To be taken continuously skipping placebo pills. 84 Tablet 5    fluconazole (DIFLUCAN) 150 MG tablet Take one tablet orally for yeast infection, if symptoms persist, may repeat treatment after 72 hours. 2 Tablet 0    levoFLOXacin (LEVAQUIN) 750 MG tablet Take 1 Tablet by mouth every day. 7 Tablet 0    ipratropium-albuterol (DUONEB) 0.5-2.5 (3) MG/3ML nebulizer solution Take 3 mL by nebulization every 6 hours as needed for Shortness of Breath. 50 Each 1    guaiFENesin ER 1200 MG TABLET SR 12 HR Take 1 Tablet by mouth every 12 hours. 20 Tablet 0    benzonatate (TESSALON) 100 MG Cap Take 1 Capsule by mouth 2 times a day as needed for Cough. 20 Capsule 0    albuterol 108 (90 Base) MCG/ACT Aero Soln inhalation aerosol Inhale 2 Puffs every 6 hours as needed for Shortness of Breath. 8.5 g 1    Probiotic, Lactobacillus, Cap Probiotic      SUMAtriptan (IMITREX) 50 MG Tab Take 1 Tablet by mouth one time as needed for Migraine for up to 1 dose. May repeat dose one time after 2 hours 10 Tablet 3     No current facility-administered medications for this visit.       Assessment & Plan    34 y.o. female with the following -     1. Upper respiratory tract infection, unspecified type  - Subacute issue, worsening.  - Concern on today's presentation for respiratory distress and worrisome vital signs with tachycardia; new compared to last office visit.   - Recommend urgent ER visit for further evaluation and management given chronicity and worsening of symptoms. Discussed would benefit from IV fluid therapy at least.  - Repeat POCT office done upon arrival; however patient  advised to go to local ER for more urgent evaluation.  - POCT Cepheid CoV-2, Flu A/B, RSV - PCR  - Call/Return precautions discussed. Patient and partner verbalized understanding and agreed with plan.    Return following ER visit.    Please note that this dictation was created using voice recognition software. I have made every reasonable attempt to correct obvious errors, but I expect that there are errors of grammar and possibly content that I did not discover before finalizing the note.

## 2024-01-17 PROBLEM — J45.901 ACUTE ASTHMA EXACERBATION: Status: ACTIVE | Noted: 2024-01-17

## 2024-01-17 PROBLEM — J21.0 RSV (ACUTE BRONCHIOLITIS DUE TO RESPIRATORY SYNCYTIAL VIRUS): Status: ACTIVE | Noted: 2024-01-17

## 2024-01-17 PROBLEM — J96.00 ACUTE RESPIRATORY FAILURE (HCC): Status: ACTIVE | Noted: 2024-01-17

## 2024-01-17 LAB
GRAM STN SPEC: NORMAL
PROCALCITONIN SERPL-MCNC: 0.08 NG/ML
SIGNIFICANT IND 70042: NORMAL
SITE SITE: NORMAL
SOURCE SOURCE: NORMAL

## 2024-01-17 PROCEDURE — 770020 HCHG ROOM/CARE - TELE (206)

## 2024-01-17 PROCEDURE — A9270 NON-COVERED ITEM OR SERVICE: HCPCS | Performed by: INTERNAL MEDICINE

## 2024-01-17 PROCEDURE — 700102 HCHG RX REV CODE 250 W/ 637 OVERRIDE(OP): Performed by: INTERNAL MEDICINE

## 2024-01-17 PROCEDURE — A9270 NON-COVERED ITEM OR SERVICE: HCPCS

## 2024-01-17 PROCEDURE — 99223 1ST HOSP IP/OBS HIGH 75: CPT | Performed by: STUDENT IN AN ORGANIZED HEALTH CARE EDUCATION/TRAINING PROGRAM

## 2024-01-17 PROCEDURE — 94640 AIRWAY INHALATION TREATMENT: CPT

## 2024-01-17 PROCEDURE — 84145 PROCALCITONIN (PCT): CPT

## 2024-01-17 PROCEDURE — 700105 HCHG RX REV CODE 258: Performed by: STUDENT IN AN ORGANIZED HEALTH CARE EDUCATION/TRAINING PROGRAM

## 2024-01-17 PROCEDURE — 700102 HCHG RX REV CODE 250 W/ 637 OVERRIDE(OP)

## 2024-01-17 PROCEDURE — 700101 HCHG RX REV CODE 250: Performed by: STUDENT IN AN ORGANIZED HEALTH CARE EDUCATION/TRAINING PROGRAM

## 2024-01-17 PROCEDURE — 700111 HCHG RX REV CODE 636 W/ 250 OVERRIDE (IP): Performed by: STUDENT IN AN ORGANIZED HEALTH CARE EDUCATION/TRAINING PROGRAM

## 2024-01-17 PROCEDURE — 36415 COLL VENOUS BLD VENIPUNCTURE: CPT

## 2024-01-17 PROCEDURE — A9270 NON-COVERED ITEM OR SERVICE: HCPCS | Performed by: STUDENT IN AN ORGANIZED HEALTH CARE EDUCATION/TRAINING PROGRAM

## 2024-01-17 PROCEDURE — 700102 HCHG RX REV CODE 250 W/ 637 OVERRIDE(OP): Performed by: STUDENT IN AN ORGANIZED HEALTH CARE EDUCATION/TRAINING PROGRAM

## 2024-01-17 RX ORDER — ALBUTEROL SULFATE 90 UG/1
2 AEROSOL, METERED RESPIRATORY (INHALATION)
Status: DISCONTINUED | OUTPATIENT
Start: 2024-01-17 | End: 2024-01-18

## 2024-01-17 RX ORDER — ZOLPIDEM TARTRATE 5 MG/1
5 TABLET ORAL ONCE
Status: ACTIVE | OUTPATIENT
Start: 2024-01-17 | End: 2024-01-18

## 2024-01-17 RX ORDER — PREDNISONE 20 MG/1
40 TABLET ORAL DAILY
Status: DISCONTINUED | OUTPATIENT
Start: 2024-01-17 | End: 2024-01-18 | Stop reason: HOSPADM

## 2024-01-17 RX ORDER — ALBUTEROL SULFATE 90 UG/1
2 AEROSOL, METERED RESPIRATORY (INHALATION)
Status: DISCONTINUED | OUTPATIENT
Start: 2024-01-17 | End: 2024-01-18 | Stop reason: HOSPADM

## 2024-01-17 RX ORDER — DOXYCYCLINE 100 MG/1
100 TABLET ORAL EVERY 12 HOURS
Status: DISCONTINUED | OUTPATIENT
Start: 2024-01-17 | End: 2024-01-18

## 2024-01-17 RX ORDER — ACETAMINOPHEN 325 MG/1
650 TABLET ORAL EVERY 4 HOURS PRN
Status: DISCONTINUED | OUTPATIENT
Start: 2024-01-17 | End: 2024-01-18 | Stop reason: HOSPADM

## 2024-01-17 RX ORDER — ALBUTEROL SULFATE 90 UG/1
AEROSOL, METERED RESPIRATORY (INHALATION)
Status: ACTIVE
Start: 2024-01-17 | End: 2024-01-17

## 2024-01-17 RX ORDER — HYDROXYZINE HYDROCHLORIDE 10 MG/1
10 TABLET, FILM COATED ORAL 3 TIMES DAILY PRN
Status: DISCONTINUED | OUTPATIENT
Start: 2024-01-17 | End: 2024-01-18 | Stop reason: HOSPADM

## 2024-01-17 RX ORDER — CHOLECALCIFEROL (VITAMIN D3) 125 MCG
5 CAPSULE ORAL NIGHTLY
Status: DISCONTINUED | OUTPATIENT
Start: 2024-01-17 | End: 2024-01-18 | Stop reason: HOSPADM

## 2024-01-17 RX ORDER — LEVALBUTEROL INHALATION SOLUTION 0.63 MG/3ML
0.63 SOLUTION RESPIRATORY (INHALATION)
Status: DISCONTINUED | OUTPATIENT
Start: 2024-01-17 | End: 2024-01-17

## 2024-01-17 RX ADMIN — ALBUTEROL SULFATE 2 PUFF: 90 AEROSOL, METERED RESPIRATORY (INHALATION) at 07:00

## 2024-01-17 RX ADMIN — AMPICILLIN AND SULBACTAM 3 G: 1; 2 INJECTION, POWDER, FOR SOLUTION INTRAMUSCULAR; INTRAVENOUS at 06:50

## 2024-01-17 RX ADMIN — METOPROLOL TARTRATE 12.5 MG: 25 TABLET, FILM COATED ORAL at 11:15

## 2024-01-17 RX ADMIN — DOXYCYCLINE 100 MG: 100 TABLET, FILM COATED ORAL at 17:26

## 2024-01-17 RX ADMIN — AMPICILLIN AND SULBACTAM 3 G: 1; 2 INJECTION, POWDER, FOR SOLUTION INTRAMUSCULAR; INTRAVENOUS at 11:18

## 2024-01-17 RX ADMIN — ACETAMINOPHEN 650 MG: 325 TABLET, FILM COATED ORAL at 01:40

## 2024-01-17 RX ADMIN — ACETAMINOPHEN 650 MG: 325 TABLET, FILM COATED ORAL at 11:15

## 2024-01-17 RX ADMIN — HYDROXYZINE HYDROCHLORIDE 10 MG: 10 TABLET ORAL at 09:02

## 2024-01-17 RX ADMIN — PREDNISONE 40 MG: 20 TABLET ORAL at 06:45

## 2024-01-17 RX ADMIN — DOXYCYCLINE 100 MG: 100 INJECTION, POWDER, LYOPHILIZED, FOR SOLUTION INTRAVENOUS at 05:46

## 2024-01-17 RX ADMIN — AMPICILLIN AND SULBACTAM 3 G: 1; 2 INJECTION, POWDER, FOR SOLUTION INTRAMUSCULAR; INTRAVENOUS at 01:45

## 2024-01-17 RX ADMIN — ACETAMINOPHEN 650 MG: 325 TABLET, FILM COATED ORAL at 17:25

## 2024-01-17 RX ADMIN — METOPROLOL TARTRATE 12.5 MG: 25 TABLET, FILM COATED ORAL at 17:26

## 2024-01-17 RX ADMIN — Medication 5 MG: at 01:56

## 2024-01-17 RX ADMIN — ALBUTEROL SULFATE 2 PUFF: 90 AEROSOL, METERED RESPIRATORY (INHALATION) at 14:30

## 2024-01-17 RX ADMIN — ALBUTEROL SULFATE 2 PUFF: 90 AEROSOL, METERED RESPIRATORY (INHALATION) at 19:04

## 2024-01-17 RX ADMIN — ALBUTEROL SULFATE 2 PUFF: 90 AEROSOL, METERED RESPIRATORY (INHALATION) at 02:52

## 2024-01-17 RX ADMIN — SODIUM CHLORIDE, POTASSIUM CHLORIDE, SODIUM LACTATE AND CALCIUM CHLORIDE: 600; 310; 30; 20 INJECTION, SOLUTION INTRAVENOUS at 12:28

## 2024-01-17 RX ADMIN — AMPICILLIN AND SULBACTAM 3 G: 1; 2 INJECTION, POWDER, FOR SOLUTION INTRAMUSCULAR; INTRAVENOUS at 17:29

## 2024-01-17 RX ADMIN — ALBUTEROL SULFATE 2 PUFF: 90 AEROSOL, METERED RESPIRATORY (INHALATION) at 10:12

## 2024-01-17 RX ADMIN — SODIUM CHLORIDE, POTASSIUM CHLORIDE, SODIUM LACTATE AND CALCIUM CHLORIDE: 600; 310; 30; 20 INJECTION, SOLUTION INTRAVENOUS at 23:17

## 2024-01-17 ASSESSMENT — COPD QUESTIONNAIRES
DO YOU EVER COUGH UP ANY MUCUS OR PHLEGM?: NO/ONLY WITH OCCASIONAL COLDS OR INFECTIONS
DURING THE PAST 4 WEEKS HOW MUCH DID YOU FEEL SHORT OF BREATH: NONE/LITTLE OF THE TIME
COPD SCREENING SCORE: 0
HAVE YOU SMOKED AT LEAST 100 CIGARETTES IN YOUR ENTIRE LIFE: NO/DON'T KNOW

## 2024-01-17 ASSESSMENT — PAIN DESCRIPTION - PAIN TYPE
TYPE: ACUTE PAIN
TYPE: DEEP SOMATIC PAIN

## 2024-01-17 ASSESSMENT — ENCOUNTER SYMPTOMS: SHORTNESS OF BREATH: 1

## 2024-01-17 ASSESSMENT — FIBROSIS 4 INDEX: FIB4 SCORE: 1.35

## 2024-01-17 NOTE — H&P
Hospital Medicine History & Physical Note    Date of Service  1/17/2024    Primary Care Physician  TRISH Galan.    Consultants  None     Code Status  Full Code    Chief Complaint  Chief Complaint   Patient presents with    Chest Pain     PT reports she had acute onset of CP yesterday evening, PT reports the CP + SOB has been increasing in intensity since yesterday. PT denies cardiac hx. PT reports she has had flu like symptom x 3 weeks prior to onset of severe CP. Hx of pneumonia leading to hospitalization in past.        History of Presenting Illness  Aminata Lorenzana is a 34 y.o. female with pmhx of asthma who presented 1/16/2024 with shortness of breath with flulike symptoms including cough, myalgias.  Patient reports she lives with 1 other person.  Reports she only uses asthma when she needs to.  In the ER, patient noted to be tachycardic chest x-ray done showing no acute abnormality.  Viral panel returned positive for RSV.  D-dimer sent was elevated prompting a CTA of the chest which was done showing atypical infiltrates hazy reticular pulmonary infiltrates.  Patient started on empiric antibiotics admitted to medicine for hypoxic respiratory failure secondary to TSV.     I discussed the plan of care with patient.    Review of Systems  Review of Systems   Respiratory:  Positive for shortness of breath.        Past Medical History   has a past medical history of Migraine with aura and Stroke ([facility]) (2017).    Surgical History   has no past surgical history on file.     Family History  family history includes Diabetes in her maternal grandmother and paternal grandfather; Stroke in her father.   Family history reviewed with patient. There is no family history that is pertinent to the chief complaint.     Social History   reports that she has never smoked. She has never used smokeless tobacco. She reports that she does not currently use alcohol. She reports current drug use. Drug:  Marijuana.    Allergies  Allergies   Allergen Reactions    [place] (Diagnostic) Hives, Shortness of Breath, Rash, Itching, Swelling and Anxiety     Other reaction(s): Unspecified    [place] Meal Unspecified          Cinnamon Hives, Shortness of Breath, Rash, Itching, Swelling and Anxiety             Medications  Prior to Admission Medications   Prescriptions Last Dose Informant Patient Reported? Taking?   Probiotic, Lactobacillus, Cap 1/15/2024 at PM Patient Yes No   Sig: Take 1 Capsule by mouth every evening.   SUMAtriptan (IMITREX) 50 MG Tab 1/15/2024 at PRN Patient No No   Sig: Take 1 Tablet by mouth one time as needed for Migraine for up to 1 dose. May repeat dose one time after 2 hours   albuterol 108 (90 Base) MCG/ACT Aero Soln inhalation aerosol 1/15/2024 at PRN Patient No No   Sig: Inhale 2 Puffs every 6 hours as needed for Shortness of Breath.   fluconazole (DIFLUCAN) 150 MG tablet 12/26/2023 at FINISHED Patient No No   Sig: Take one tablet orally for yeast infection, if symptoms persist, may repeat treatment after 72 hours.   Patient taking differently: Take 150 mg by mouth every 72 hours. Take one tablet orally for yeast infection, if symptoms persist, may repeat treatment after 72 hours.   guaiFENesin ER 1200 MG TABLET SR 12 HR > 2 WEEKS at PRN Patient No No   Sig: Take 1 Tablet by mouth every 12 hours.   ipratropium-albuterol (DUONEB) 0.5-2.5 (3) MG/3ML nebulizer solution 1/16/2024 at 0500 Patient No No   Sig: Take 3 mL by nebulization every 6 hours as needed for Shortness of Breath.   levoFLOXacin (LEVAQUIN) 750 MG tablet 1/1/2024 at FINISHED Patient No No   Sig: Take 1 Tablet by mouth every day.   levonorgestrel-ethinyl estradiol (AVIANE) 0.1-20 MG-MCG per tablet 1/15/2024 at PM Patient No No   Sig: Take 1 Tablet by mouth every day. To be taken continuously skipping placebo pills.      Facility-Administered Medications: None       Physical Exam  Temp:  [37.6 °C (99.7 °F)-37.9 °C (100.3 °F)] 37.9 °C  "(100.3 °F)  Pulse:  [117-156] 117  Resp:  [18-30] 22  BP: (118-140)/(76-96) 134/94  SpO2:  [91 %-97 %] 94 %  Blood Pressure: (!) 134/94   Temperature: 37.9 °C (100.3 °F)   Pulse: (!) 117   Respiration: (!) 22   Pulse Oximetry: 94 %       Physical Exam  HENT:      Head: Normocephalic and atraumatic.      Mouth/Throat:      Pharynx: Oropharynx is clear.   Eyes:      Extraocular Movements: Extraocular movements intact.   Cardiovascular:      Rate and Rhythm: Regular rhythm. Tachycardia present.   Pulmonary:      Breath sounds: Wheezing (minimal) present.   Neurological:      Mental Status: She is alert and oriented to person, place, and time.         Laboratory:  Recent Labs     01/16/24  1626   WBC 12.1*   RBC 4.76   HEMOGLOBIN 15.2   HEMATOCRIT 43.9   MCV 92.2   MCH 31.9   MCHC 34.6   RDW 43.3   PLATELETCT 163*   MPV 11.4     Recent Labs     01/16/24  1626   SODIUM 136   POTASSIUM 3.4*   CHLORIDE 100   CO2 19*   GLUCOSE 99   BUN 4*   CREATININE 0.76   CALCIUM 9.4     Recent Labs     01/16/24  1626   ALTSGPT 6   ASTSGOT 15   ALKPHOSPHAT 68   TBILIRUBIN 0.6   GLUCOSE 99         No results for input(s): \"NTPROBNP\" in the last 72 hours.      Recent Labs     01/16/24  1626 01/16/24  1800   TROPONINT <6 <6       Imaging:  CT-CTA CHEST PULMONARY ARTERY W/ RECONS   Final Result         1.  No pulmonary embolus appreciated.   2.  Hazy reticular pulmonary infiltrates, greatest in the lung bases, appearance favoring atypical infiltrates. Consider Covid infiltrates as clinically appropriate.   3.  Scattered hazy lingular and bilateral lower lobe infiltrates, could represent superimposed infiltrates.      DX-CHEST-PORTABLE (1 VIEW)   Final Result      No acute cardiac or pulmonary abnormalities are identified.          X-Ray:  I have personally reviewed the images and compared with prior images.    Assessment/Plan:  Justification for Admission Status  I anticipate this patient will require at least two midnights for appropriate " "medical management, necessitating inpatient admission because atypical pneumonia, RSV infection, hypoxia requiring oxygen supplementation     Patient will need a Telemetry bed on MEDICAL service .  The need is secondary to see above.    * Pneumonia due to organism- (present on admission)  Assessment & Plan  Continue Empiric Unasyn and Doxycycline given prolonged QT  F/u respiratory cultures   Check procal     Acute asthma exacerbation  Assessment & Plan  Suspect component of asthma exacerbation   Continue with albuterol MDI      Start on systemic steroids 40 mg prednisone x 5   Oxygen per protocol     Acute respiratory failure (HCC)  Assessment & Plan  Oxygen per protocol   Empiric abx       RSV (acute bronchiolitis due to respiratory syncytial virus)  Assessment & Plan  Continue with droplet, contact precautions  Empiric antibiotics  Check Pro-Rogelio, de-escalate based on clinical status        VTE prophylaxis: SCDs/TEDs    This note was generated using voice recognition software which is a small chance of losing errors of grammar and possibly guarded.  Occasional wrong word or \"sound alike\" substitutions may have occurred due to inherent limitation of voice recognition software.  Read the chart carefully recognize and context, where the substitutions have occurred.  I have made every reasonable attempt to find and correct any obvious errors, but expect that some may not be found prior to finalization of these notes.    "

## 2024-01-17 NOTE — ASSESSMENT & PLAN NOTE
Continue with droplet, contact precautions  Empiric antibiotics  Check Pro-Rogelio, de-escalate based on clinical status

## 2024-01-17 NOTE — CARE PLAN
The patient is Stable - Low risk of patient condition declining or worsening    Shift Goals  Clinical Goals: IV abx, IV fluids  Patient Goals: to get better    Progress made toward(s) clinical / shift goals:    Problem: Knowledge Deficit - Standard  Goal: Patient and family/care givers will demonstrate understanding of plan of care, disease process/condition, diagnostic tests and medications  Description: Target End Date:  1-3 days or as soon as patient condition allows    Document in Patient Education    1.  Patient and family/caregiver oriented to unit, equipment, visitation policy and means for communicating concern  2.  Complete/review Learning Assessment  3.  Assess knowledge level of disease process/condition, treatment plan, diagnostic tests and medications  4.  Explain disease process/condition, treatment plan, diagnostic tests and medications  Outcome: Progressing     Problem: Hemodynamics  Goal: Patient's hemodynamics, fluid balance and neurologic status will be stable or improve  Description: Target End Date:  Prior to discharge or change in level of care    Document on Assessment and I/O flowsheet templates    1.  Monitor vital signs, pulse oximetry and cardiac monitor per provider order and/or policy  2.  Maintain blood pressure per provider order  3.  Hemodynamic monitoring per provider order  4.  Manage IV fluids and IV infusions  5.  Monitor intake and output  6.  Daily weights per unit policy or provider order  7.  Assess peripheral pulses and capillary refill  8.  Assess color and body temperature  9.  Position patient for maximum circulation/cardiac output  10. Monitor for signs/symptoms of excessive bleeding  11. Assess mental status, restlessness and changes in level of consciousness  12. Monitor temperature and report fever or hypothermia to provider immediately. Consideration of targeted temperature management.  Outcome: Progressing     Problem: Urinary - Renal Perfusion  Goal: Ability to  achieve and maintain adequate renal perfusion and functioning will improve  Description: Target End Date:  Prior to discharge or change in level of care    Document on I/O and Assessment flowsheet    1.  Urine output will remain greater than 0.5ml/Kg/HR  2.  Monitor amount and/or characteristics of urine per order/policy. Specific gravity per order/policy  3.  Assess signs and symptoms of renal dysfunction  Outcome: Progressing     Problem: Respiratory  Goal: Patient will achieve/maintain optimum respiratory ventilation and gas exchange  Description: Target End Date:  Prior to discharge or change in level of care    Document on Assessment flowsheet    1.  Assess and monitor rate, rhythm, depth and effort of respiration  2.  Breath sounds assessed qshift and/or as needed  3.  Assess O2 saturation, administer/titrate oxygen as ordered  4.  Position patient for maximum ventilatory efficiency  5.  Turn, cough, and deep breath with splinting to improve effectiveness  6.  Collaborate with RT to administer medication/treatments per order  7.  Encourage use of incentive spirometer and encourage patient to cough after use and utilize splinting techniques if applicable  8.  Airway suctioning  9.  Monitor sputum production for changes in color, consistency and frequency  10. Perform frequent oral hygiene  11. Alternate physical activity with rest periods  Outcome: Progressing     Problem: Pain - Standard  Goal: Alleviation of pain or a reduction in pain to the patient’s comfort goal  Description: Target End Date:  Prior to discharge or change in level of care    Document on Vitals flowsheet    1.  Document pain using the appropriate pain scale per order or unit policy  2.  Educate and implement non-pharmacologic comfort measures (i.e. relaxation, distraction, massage, cold/heat therapy, etc.)  3.  Pain management medications as ordered  4.  Reassess pain after pain med administration per policy  5.  If opiods administered  assess patient's response to pain medication is appropriate per POSS sedation scale  6.  Follow pain management plan developed in collaboration with patient and interdisciplinary team (including palliative care or pain specialists if applicable)  Outcome: Progressing       Patient is not progressing towards the following goals:

## 2024-01-17 NOTE — RESPIRATORY CARE
Called to bedside to assess patient for dyspnea. Upon presentation she has increased work of breathing, tracheal tug and is speaking in short sentences. Breath sounds are clear throughout with diminished right lower lobe. She expressed that she feels like she is wheezing and cannot catch her breath. She has Xopenex PRN available on MAR, however she expresses that ANY nebulized medication increases her heart rate. She would like an Albuterol inhaler, which she uses at home with no issues.

## 2024-01-17 NOTE — DISCHARGE PLANNING
In the case of an emergency, pt's legal NOK is s/o [address]     RNCM met with pt at bedside and obtained the information used in this assessment. Pt verified accuracy of facesheet. Pt lives in a single story apt with s/o. Pt uses [facility]. Prior to current hospitalization, pt was completely independent in ADLS/IADLS. Pt drives and is able to attend necessary MD appointments.  Pt has a good support system. Pt denies any hx of substance use and denies any dx of mh. Owns no DME. S/O good support at home.    Care Transition Team Assessment    Information Source:pt  Orientation Level: Oriented X4  Information Given By: Patient  Informant's Name: Rashmi  Who is responsible for making decisions for patient? : Patient         Elopement Risk  Legal Hold: No  Ambulatory or Self Mobile in Wheelchair: No-Not an Elopement Risk  Elopement Risk: Not at Risk for Elopement    Interdisciplinary Discharge Planning  Does Admitting Nurse Feel This Could be a Complex Discharge?: No  Primary Care Physician: Rafael  Lives with - Patient's Self Care Capacity: Significant Other  Patient or legal guardian wants to designate a caregiver: No  Support Systems: Friends / Neighbors  Housing / Facility: 1 Story Apartment / Condo  Do You Take your Prescribed Medications Regularly: Yes  Mobility Issues: No  Prior Services: None  Durable Medical Equipment: Not Applicable    Discharge Preparedness  What is your plan after discharge?: Home with help  What are your discharge supports?: Partner  Prior Functional Level: Ambulatory, Drives Self, Independent with Activities of Daily Living, Independent with Medication Management  Difficulity with ADLs: None  Difficulity with IADLs: None    Functional Assesment  Prior Functional Level: Ambulatory, Drives Self, Independent with Activities of Daily Living, Independent with Medication Management    Finances  Prescription Coverage: Yes    Vision / Hearing Impairment  Vision Impairment :  No  Hearing Impairment : No              Domestic Abuse  Have you ever been the victim of abuse or violence?: No  Physical Abuse or Sexual Abuse: No  Verbal Abuse or Emotional Abuse: No  Possible Abuse/Neglect Reported to:: Not Applicable    Psychological Assessment  History of Substance Abuse: None  History of Psychiatric Problems: No         Anticipated Discharge Information  Discharge Disposition: Discharged to home/self care (01)

## 2024-01-17 NOTE — PROGRESS NOTES
Telemetry Shift Summary     Rhythm: ST  HR: 123-126    Measurements: .14/.05/.30              Normal Values  Rhythm: SR  HR:   Measurements: 0.12-0.20/0.08-0.10/0.30-0.52

## 2024-01-17 NOTE — ED PROVIDER NOTES
"ED Provider Note    CHIEF COMPLAINT  Chief Complaint   Patient presents with    Chest Pain     PT reports she had acute onset of CP yesterday evening, PT reports the CP + SOB has been increasing in intensity since yesterday. PT denies cardiac hx. PT reports she has had flu like symptom x 3 weeks prior to onset of severe CP. Hx of pneumonia leading to hospitalization in past.        EXTERNAL RECORDS REVIEWED  Outpatient Notes   [facility],    HPI/ROS  LIMITATION TO HISTORY   Select: : None  OUTSIDE HISTORIAN(S):  Significant other.  States patient has been feeling unwell for the last couple of weeks    Aminata Lorenzana is a 34 y.o. female who presents here for evaluation of wheezing, cough and chest pain.  Patient states that she has been feeling unwell for the last couple of weeks, with some low-grade fevers.  She has been tachycardic for the last few days, and states that she just been \"really short of breath.  Patient has no vomiting, no back pain, or abdominal pain.    PAST MEDICAL HISTORY   has a past medical history of Migraine with aura and Stroke ([facility]) (2017).    SURGICAL HISTORY  patient denies any surgical history    FAMILY HISTORY  Family History   Problem Relation Age of Onset    Stroke Father     Diabetes Maternal Grandmother     Diabetes Paternal Grandfather        SOCIAL HISTORY  Social History     Tobacco Use    Smoking status: Never    Smokeless tobacco: Never   Vaping Use    Vaping Use: Never used   Substance and Sexual Activity    Alcohol use: Not Currently     Comment: occ    Drug use: Yes     Types: Marijuana     Comment: occ    Sexual activity: Yes     Partners: Male     Birth control/protection: Pill       CURRENT MEDICATIONS  Home Medications       Reviewed by Ez Felix (Pharmacy Tech) on 01/16/24 at 221  Med List Status: Complete     Medication Last Dose Status   albuterol 108 (90 Base) MCG/ACT Aero Soln inhalation aerosol 1/15/2024 Active   fluconazole " "(DIFLUCAN) 150 MG tablet 12/26/2023 Active   guaiFENesin ER 1200 MG TABLET SR 12 HR > 2 WEEKS Active   ipratropium-albuterol (DUONEB) 0.5-2.5 (3) MG/3ML nebulizer solution 1/16/2024 Active   levoFLOXacin (LEVAQUIN) 750 MG tablet 1/1/2024 Active   levonorgestrel-ethinyl estradiol (AVIANE) 0.1-20 MG-MCG per tablet 1/15/2024 Active   Probiotic, Lactobacillus, Cap 1/15/2024 Active   SUMAtriptan (IMITREX) 50 MG Tab 1/15/2024 Active                    ALLERGIES  Allergies   Allergen Reactions    [place] (Diagnostic) Hives, Shortness of Breath, Rash, Itching, Swelling and Anxiety     Other reaction(s): Unspecified    [place] Meal Unspecified          Cinnamon Hives, Shortness of Breath, Rash, Itching, Swelling and Anxiety             PHYSICAL EXAM  VITAL SIGNS: /76   Pulse (!) 131   Temp 37.9 °C (100.3 °F) (Oral)   Resp 20   Ht 1.651 m (5' 5\")   Wt 51.4 kg (113 lb 5.1 oz)   LMP 10/16/2023 Comment: Birth control  SpO2 93%   BMI 18.86 kg/m²    Constitutional: Well developed, well nourished.  Mild acute distress.  HEENT: Normocephalic, atraumatic. Posterior pharynx clear and moist.  Eyes:  EOMI. Normal sclera.  Neck: Supple, Full range of motion, nontender.  Chest/Pulmonary:  mild expiratory wheeze, no respiratory distress  Cardio: Tachycardic rate and rhythm with no murmur.   Abdomen: Soft, nontender. No peritoneal signs. No guarding. No palpable masses.  Back: No CVA tenderness, nontender midline, no step offs.  Musculoskeletal: No deformity, no edema, neurovascular intact.   Neuro: Clear speech, appropriate, cooperative, cranial nerves II-XII grossly intact.  Psych: Normal mood and affect      DIAGNOSTIC STUDIES / PROCEDURES  Results for orders placed or performed during the hospital encounter of 01/16/24   Lactic acid (lactate)   Result Value Ref Range    Lactic Acid 2.3 (H) 0.5 - 2.0 mmol/L   Lactic acid (lactate): Repeat if initial lactic acid result is greater than 2   Result Value Ref Range    Lactic Acid " 1.9 0.5 - 2.0 mmol/L   CBC With Differential   Result Value Ref Range    WBC 12.1 (H) 4.8 - 10.8 K/uL    RBC 4.76 4.20 - 5.40 M/uL    Hemoglobin 15.2 12.0 - 16.0 g/dL    Hematocrit 43.9 37.0 - 47.0 %    MCV 92.2 81.4 - 97.8 fL    MCH 31.9 27.0 - 33.0 pg    MCHC 34.6 32.2 - 35.5 g/dL    RDW 43.3 35.9 - 50.0 fL    Platelet Count 163 (L) 164 - 446 K/uL    MPV 11.4 9.0 - 12.9 fL    Neutrophils-Polys 89.60 (H) 44.00 - 72.00 %    Lymphocytes 5.50 (L) 22.00 - 41.00 %    Monocytes 3.90 0.00 - 13.40 %    Eosinophils 0.30 0.00 - 6.90 %    Basophils 0.50 0.00 - 1.80 %    Immature Granulocytes 0.20 0.00 - 0.90 %    Nucleated RBC 0.00 0.00 - 0.20 /100 WBC    Neutrophils (Absolute) 10.81 (H) 1.82 - 7.42 K/uL    Lymphs (Absolute) 0.66 (L) 1.00 - 4.80 K/uL    Monos (Absolute) 0.47 0.00 - 0.85 K/uL    Eos (Absolute) 0.04 0.00 - 0.51 K/uL    Baso (Absolute) 0.06 0.00 - 0.12 K/uL    Immature Granulocytes (abs) 0.03 0.00 - 0.11 K/uL    NRBC (Absolute) 0.00 K/uL   Comp Metabolic Panel   Result Value Ref Range    Sodium 136 135 - 145 mmol/L    Potassium 3.4 (L) 3.6 - 5.5 mmol/L    Chloride 100 96 - 112 mmol/L    Co2 19 (L) 20 - 33 mmol/L    Anion Gap 17.0 (H) 7.0 - 16.0    Glucose 99 65 - 99 mg/dL    Bun 4 (L) 8 - 22 mg/dL    Creatinine 0.76 0.50 - 1.40 mg/dL    Calcium 9.4 8.5 - 10.5 mg/dL    Correct Calcium 9.2 8.5 - 10.5 mg/dL    AST(SGOT) 15 12 - 45 U/L    ALT(SGPT) 6 2 - 50 U/L    Alkaline Phosphatase 68 30 - 99 U/L    Total Bilirubin 0.6 0.1 - 1.5 mg/dL    Albumin 4.3 3.2 - 4.9 g/dL    Total Protein 8.3 (H) 6.0 - 8.2 g/dL    Globulin 4.0 (H) 1.9 - 3.5 g/dL    A-G Ratio 1.1 g/dL   Urinalysis    Specimen: Urine, Clean Catch   Result Value Ref Range    Color Yellow     Character Clear     Specific Gravity 1.017 <1.035    Ph 7.0 5.0 - 8.0    Glucose Negative Negative mg/dL    Ketones Trace (A) Negative mg/dL    Protein Negative Negative mg/dL    Bilirubin Negative Negative    Urobilinogen, Urine 0.2 Negative    Nitrite Negative  Negative    Leukocyte Esterase Trace (A) Negative    Occult Blood Negative Negative    Micro Urine Req Microscopic    Troponins in two (2) hours   Result Value Ref Range    Troponin T <6 6 - 19 ng/L   HCG Qual Serum   Result Value Ref Range    Beta-Hcg Qualitative Serum Negative Negative   URINE MICROSCOPIC (W/UA)   Result Value Ref Range    WBC 0-2 /hpf    RBC 2-5 (A) /hpf    Bacteria Moderate (A) None /hpf    Epithelial Cells Few /hpf    Hyaline Cast 0-2 /lpf   TROPONIN   Result Value Ref Range    Troponin T <6 6 - 19 ng/L   ESTIMATED GFR   Result Value Ref Range    GFR (CKD-EPI) 104 >60 mL/min/1.73 m 2   D-DIMER   Result Value Ref Range    D-Dimer 1.39 (H) 0.00 - 0.50 ug/mL (FEU)   PROCALCITONIN   Result Value Ref Range    Procalcitonin 0.06 <0.25 ng/mL   MAGNESIUM   Result Value Ref Range    Magnesium 1.7 1.5 - 2.5 mg/dL   EKG   Result Value Ref Range    Report       [facility] Emergency Dept.    Test Date:  2024  Pt Name:    KIRBY PIERRE                 Department: ER  MRN:        [id number]                      Room:  Gender:     Female                       Technician: [id number]  :        [id number]                   Requested By:ER TRIAGE PROTOCOL  Order #:    [id number]                    Reading MD:    Measurements  Intervals                                Axis  Rate:       139                          P:          73  CO:         106                          QRS:        80  QRSD:       61                           T:          -53  QT:         355  QTc:        540    Interpretive Statements  Sinus tachycardia  Borderline repolarization abnormality  Prolonged QT interval  Baseline wander in lead(s) II,III,aVF  Compared to ECG 2023 16:45:06  Prolonged QT interval now present  Sinus rhythm no longer present     POC CoV-2, FLU A/B, RSV by PCR   Result Value Ref Range    POC Influenza A RNA, PCR Negative Negative    POC Influenza B RNA, PCR Negative Negative    POC RSV, by PCR POSITIVE  (A) Negative    POC SARS-CoV-2, PCR NotDetected       EKG; sinus tach at a rate of 139.  No ST elevation, no ST depression.  QTc is 540.  Comparison EKGs from 4/1/2023.    RADIOLOGY  I have independently interpreted the diagnostic imaging associated with this visit and am waiting the final reading from the radiologist.   My preliminary interpretation is as follows: See below  Radiologist interpretation:   CT-CTA CHEST PULMONARY ARTERY W/ RECONS   Final Result         1.  No pulmonary embolus appreciated.   2.  Hazy reticular pulmonary infiltrates, greatest in the lung bases, appearance favoring atypical infiltrates. Consider Covid infiltrates as clinically appropriate.   3.  Scattered hazy lingular and bilateral lower lobe infiltrates, could represent superimposed infiltrates.      DX-CHEST-PORTABLE (1 VIEW)   Final Result      No acute cardiac or pulmonary abnormalities are identified.            COURSE & MEDICAL DECISION MAKING    Patient will be admitted to the hospital service.    INITIAL ASSESSMENT, COURSE AND PLAN  Care Narrative: This is a 34-year-old female here for evaluation of shortness of breath, and wheezing.  Patient was given a breathing treatment while here, and was initially given a sepsis bolus of IV fluid.  The patient came back with RSV being positive, and a D-dimer as well.  CT scan showed bilateral lower atypical infiltrates, so she was then started on antibiotics.  Her initial lactic was mildly elevated, but she was RSV positive.  The repeat lactic was normal.  Patient was checked on an various times, monitoring her tachycardia, but patient has no hypoxia.    CRITICAL CARE  The very real possibility of a deterioration of this patient's condition required the highest level of my preparedness for sudden, emergent intervention.  I provided critical care services, which included medication orders, frequent reevaluations of the patient's condition and response to treatment, ordering and reviewing  test results, and discussing the case with various consultants.  The critical care time associated with the care of the patient was 56 minutes. Review chart for interventions. This time is exclusive of any other billable procedures.       DISPOSITION AND DISCUSSIONS  I have discussed management of the patient with the following physicians and JIGNESH's: Hospitalist service      FINAL DIAGNOSIS  1. Pneumonia due to infectious organism, unspecified laterality, unspecified part of lung    2.      Critical care time 56 minutes.        Electronically signed by: Julien Nolasco D.O., 1/16/2024 10:33 PM

## 2024-01-17 NOTE — DIETARY
"Nutrition services: Day 1 of admit.  Aminata Lorenzana is a 34 y.o. female with admitting DX of Pneumonia due to organism    Consult received for unintentional weight loss and poor PO intake; MST 2    RD/intern visited pt at bedside. Pt reported 8 lb weight loss over the past 3 weeks and stated that her UBW is 115 lb. She attributed this loss to fluctuating appetite r/t illness and fever, and being inactive. She said she is usually an active person and has had poor energy as a result of this recent illness.   Pt reported eating about 50% of breakfast and stated that she has been very hungry since yesterday. RD/intern offered supplements and pt was agreeable to Ensure Plant Based BID. Left her with a menu and encouraged her to call the kitchen for meal selections.     Assessment:  Height: 165.1 cm (5' 5\")  Weight: 51.4 kg (113 lbs 5.1 oz) via stand up scale   Body mass index is 18.86 kg/m²., BMI classification: Underweight   Diet/Intake: Regular (vegetarian diet); No meals recorded     Wt Readings from Last 7 Encounters:   01/17/24 50.6 kg (111 lb 8.8 oz)   01/16/24 51.4 kg (113 lb 6.4 oz)   12/30/23 51.2 kg (112 lb 14 oz)   12/26/23 51.7 kg (114 lb)   12/19/23 52.3 kg (115 lb 6.4 oz)   11/10/23 53.5 kg (118 lb)   07/05/23 52.8 kg (116 lb 6.4 oz)     Weight Change: Per chart review, pt had a weight of 119 lbs in June 2023, this is a 5% weight loss x >6 mo which is not clinically significant.     Based on interview with the pt utilizing her UBW, she has had weight loss of 3.6% over 3 weeks, which is also not clinically significant.    Evaluation:   PMH: RSV, asthma who presented 1/16/2024 with shortness of breath with flulike symptoms including cough, myalgias.   Labs: K+ 3.4, Bun 4  Meds reviewed   +BM: No documentation; likely PTA   Nutrition focused physical exam: No moderate/severe muscle or fat wasting noted.    Malnutrition Risk: Does not meet ASPEN criteria for malnutrition.    Recommendations/Plan:  Ensure " Plant Based BID.  Encourage intake of >50% meals and supplements.  Document intake of all meals and supplements as % taken in ADL's to provide interdisciplinary communication across all shifts.   Monitor weight.  Nutrition rep will continue to see patient for ongoing meal and snack preferences.     RD monitoring per department policy

## 2024-01-17 NOTE — PROGRESS NOTES
4 Eyes Skin Assessment Completed by GIOVANNI Napier and SUKH Vásquez.    Head WDL  Ears WDL  Nose WDL  Mouth WDL  Neck WDL  Breast/Chest WDL  Shoulder Blades WDL  Spine WDL  (R) Arm/Elbow/Hand WDL  (L) Arm/Elbow/Hand WDL  Abdomen WDL  Groin WDL  Scrotum/Coccyx/Buttocks WDL  (R) Leg WDL  (L) Leg WDL  (R) Heel/Foot/Toe WDL  (L) Heel/Foot/Toe WDL          Devices In Places Tele Box and Pulse Ox      Interventions In Place Pillows    Possible Skin Injury No    Pictures Uploaded Into Epic N/A  Wound Consult Placed N/A  RN Wound Prevention Protocol Ordered No

## 2024-01-17 NOTE — PROGRESS NOTES
Patient seen and examined, admitted after midnight for RSV infection and acute respiratory failure with hypoxia.   Started on steroid and also on abx to cover for superimposed bacterial infection   Wean off O2 as tolerated   For more info please refer to H&P.

## 2024-01-17 NOTE — DISCHARGE PLANNING
Case Management Discharge Planning    Admission Date: 1/16/2024  GMLOS: 4.1  ALOS: 1    6-Clicks ADL Score: 24  6-Clicks Mobility Score: 24      Anticipated Discharge Dispo: Discharge Disposition: Discharged to home/self care (01)    DME Needed: No    Action(s) Taken: Updated Provider/Nurse on Discharge Plan    Escalations Completed: None    Medically Clear: No    Next Steps: Anticipate discharge to home with S/O soon.    Barriers to Discharge: Medical clearance    Is the patient up for discharge tomorrow: No

## 2024-01-17 NOTE — ASSESSMENT & PLAN NOTE
Suspect component of asthma exacerbation   Continue with albuterol MDI      Start on systemic steroids 40 mg prednisone x 5   Oxygen per protocol

## 2024-01-17 NOTE — ASSESSMENT & PLAN NOTE
Continue Empiric Unasyn and Doxycycline given prolonged QT  F/u respiratory cultures   Check procal

## 2024-01-17 NOTE — ED NOTES
Med rec complete per patient  Allergies reviewed.     Outpatient antibiotics in the last 30 days? Yes     Anticoagulants taken in the last 14 days? No     Pharmacy patient utilizes: [facility] on [address]

## 2024-01-18 ENCOUNTER — PHARMACY VISIT (OUTPATIENT)
Dept: PHARMACY | Facility: MEDICAL CENTER | Age: 34
End: 2024-01-18
Payer: COMMERCIAL

## 2024-01-18 VITALS
BODY MASS INDEX: 18.99 KG/M2 | HEART RATE: 90 BPM | WEIGHT: 113.98 LBS | TEMPERATURE: 97.9 F | OXYGEN SATURATION: 96 % | RESPIRATION RATE: 16 BRPM | HEIGHT: 65 IN | DIASTOLIC BLOOD PRESSURE: 84 MMHG | SYSTOLIC BLOOD PRESSURE: 130 MMHG

## 2024-01-18 LAB
ANION GAP SERPL CALC-SCNC: 12 MMOL/L (ref 7–16)
BACTERIA SPEC RESP CULT: NORMAL
BACTERIA UR CULT: NORMAL
BUN SERPL-MCNC: 5 MG/DL (ref 8–22)
CALCIUM SERPL-MCNC: 9 MG/DL (ref 8.5–10.5)
CHLORIDE SERPL-SCNC: 105 MMOL/L (ref 96–112)
CO2 SERPL-SCNC: 23 MMOL/L (ref 20–33)
CREAT SERPL-MCNC: 0.63 MG/DL (ref 0.5–1.4)
ERYTHROCYTE [DISTWIDTH] IN BLOOD BY AUTOMATED COUNT: 45.5 FL (ref 35.9–50)
GFR SERPLBLD CREATININE-BSD FMLA CKD-EPI: 118 ML/MIN/1.73 M 2
GLUCOSE SERPL-MCNC: 120 MG/DL (ref 65–99)
GRAM STN SPEC: NORMAL
HCT VFR BLD AUTO: 42.3 % (ref 37–47)
HGB BLD-MCNC: 14.1 G/DL (ref 12–16)
MCH RBC QN AUTO: 31.3 PG (ref 27–33)
MCHC RBC AUTO-ENTMCNC: 33.3 G/DL (ref 32.2–35.5)
MCV RBC AUTO: 94 FL (ref 81.4–97.8)
PLATELET # BLD AUTO: 159 K/UL (ref 164–446)
PMV BLD AUTO: 11.8 FL (ref 9–12.9)
POTASSIUM SERPL-SCNC: 3.5 MMOL/L (ref 3.6–5.5)
RBC # BLD AUTO: 4.5 M/UL (ref 4.2–5.4)
SIGNIFICANT IND 70042: NORMAL
SIGNIFICANT IND 70042: NORMAL
SITE SITE: NORMAL
SITE SITE: NORMAL
SODIUM SERPL-SCNC: 140 MMOL/L (ref 135–145)
SOURCE SOURCE: NORMAL
SOURCE SOURCE: NORMAL
WBC # BLD AUTO: 11 K/UL (ref 4.8–10.8)

## 2024-01-18 PROCEDURE — A9270 NON-COVERED ITEM OR SERVICE: HCPCS | Performed by: STUDENT IN AN ORGANIZED HEALTH CARE EDUCATION/TRAINING PROGRAM

## 2024-01-18 PROCEDURE — 700102 HCHG RX REV CODE 250 W/ 637 OVERRIDE(OP): Mod: JZ | Performed by: INTERNAL MEDICINE

## 2024-01-18 PROCEDURE — 700111 HCHG RX REV CODE 636 W/ 250 OVERRIDE (IP): Performed by: STUDENT IN AN ORGANIZED HEALTH CARE EDUCATION/TRAINING PROGRAM

## 2024-01-18 PROCEDURE — 700102 HCHG RX REV CODE 250 W/ 637 OVERRIDE(OP): Performed by: STUDENT IN AN ORGANIZED HEALTH CARE EDUCATION/TRAINING PROGRAM

## 2024-01-18 PROCEDURE — A9270 NON-COVERED ITEM OR SERVICE: HCPCS | Mod: JZ | Performed by: INTERNAL MEDICINE

## 2024-01-18 PROCEDURE — 700105 HCHG RX REV CODE 258: Performed by: STUDENT IN AN ORGANIZED HEALTH CARE EDUCATION/TRAINING PROGRAM

## 2024-01-18 PROCEDURE — RXMED WILLOW AMBULATORY MEDICATION CHARGE: Performed by: INTERNAL MEDICINE

## 2024-01-18 PROCEDURE — 99239 HOSP IP/OBS DSCHRG MGMT >30: CPT | Performed by: INTERNAL MEDICINE

## 2024-01-18 PROCEDURE — 85027 COMPLETE CBC AUTOMATED: CPT

## 2024-01-18 PROCEDURE — 36415 COLL VENOUS BLD VENIPUNCTURE: CPT

## 2024-01-18 PROCEDURE — 80048 BASIC METABOLIC PNL TOTAL CA: CPT

## 2024-01-18 RX ORDER — POTASSIUM CHLORIDE 20 MEQ/1
40 TABLET, EXTENDED RELEASE ORAL ONCE
Status: COMPLETED | OUTPATIENT
Start: 2024-01-18 | End: 2024-01-18

## 2024-01-18 RX ORDER — ALBUTEROL SULFATE 90 UG/1
2 AEROSOL, METERED RESPIRATORY (INHALATION) EVERY 4 HOURS
Status: DISCONTINUED | OUTPATIENT
Start: 2024-01-18 | End: 2024-01-18

## 2024-01-18 RX ORDER — ALBUTEROL SULFATE 90 UG/1
2 AEROSOL, METERED RESPIRATORY (INHALATION) 4 TIMES DAILY
Status: DISCONTINUED | OUTPATIENT
Start: 2024-01-18 | End: 2024-01-18 | Stop reason: HOSPADM

## 2024-01-18 RX ORDER — PREDNISONE 20 MG/1
40 TABLET ORAL DAILY
Qty: 6 TABLET | Refills: 0 | Status: SHIPPED | OUTPATIENT
Start: 2024-01-18 | End: 2024-01-21

## 2024-01-18 RX ADMIN — Medication 5 MG: at 01:07

## 2024-01-18 RX ADMIN — PREDNISONE 40 MG: 20 TABLET ORAL at 06:14

## 2024-01-18 RX ADMIN — AMPICILLIN AND SULBACTAM 3 G: 1; 2 INJECTION, POWDER, FOR SOLUTION INTRAMUSCULAR; INTRAVENOUS at 06:20

## 2024-01-18 RX ADMIN — METOPROLOL TARTRATE 12.5 MG: 25 TABLET, FILM COATED ORAL at 06:14

## 2024-01-18 RX ADMIN — POTASSIUM CHLORIDE 40 MEQ: 1500 TABLET, EXTENDED RELEASE ORAL at 12:22

## 2024-01-18 RX ADMIN — ACETAMINOPHEN 650 MG: 325 TABLET, FILM COATED ORAL at 06:17

## 2024-01-18 RX ADMIN — ALBUTEROL SULFATE 2 PUFF: 90 AEROSOL, METERED RESPIRATORY (INHALATION) at 11:11

## 2024-01-18 RX ADMIN — DOXYCYCLINE 100 MG: 100 TABLET, FILM COATED ORAL at 06:14

## 2024-01-18 RX ADMIN — AMPICILLIN AND SULBACTAM 3 G: 1; 2 INJECTION, POWDER, FOR SOLUTION INTRAMUSCULAR; INTRAVENOUS at 00:42

## 2024-01-18 RX ADMIN — ALBUTEROL SULFATE 2 PUFF: 90 AEROSOL, METERED RESPIRATORY (INHALATION) at 07:43

## 2024-01-18 ASSESSMENT — FIBROSIS 4 INDEX: FIB4 SCORE: 1.35

## 2024-01-18 NOTE — CARE PLAN
The patient is Stable - Low risk of patient condition declining or worsening    Shift Goals  Clinical Goals: Hemodynamic stability  Patient Goals: comfort    Progress made toward(s) clinical / shift goals:     Problem: Knowledge Deficit - Standard  Goal: Patient and family/care givers will demonstrate understanding of plan of care, disease process/condition, diagnostic tests and medications  Outcome: Progressing     Problem: Hemodynamics  Goal: Patient's hemodynamics, fluid balance and neurologic status will be stable or improve  Outcome: Progressing     Problem: Fluid Volume  Goal: Fluid volume balance will be maintained  Outcome: Progressing     Problem: Respiratory  Goal: Patient will achieve/maintain optimum respiratory ventilation and gas exchange  Outcome: Progressing     Problem: Physical Regulation  Goal: Diagnostic test results will improve  Outcome: Progressing     Problem: Pain - Standard  Goal: Alleviation of pain or a reduction in pain to the patient’s comfort goal  Outcome: Progressing

## 2024-01-18 NOTE — PROGRESS NOTES
Prescriptions given to patient. Discharge instructions given to patient at bedside, verbalizes understanding and states plans for follow-up with PCP. New and home medication review, post-discharge activity level and worsening of symptoms needing follow-up care discussed. Telemetry monitor/IV cathlon removed. All belongings accounted for, all questions answered at this time. Patient refused wheelchair and escort out, patient wants to walk out.

## 2024-01-18 NOTE — PROGRESS NOTES
Telemetry Shift Summary     Rhythm: SR  HR: 71-96    Measurements: .12/.08/.34          Normal Values  Rhythm: SR  HR:   Measurements: 0.12-0.20/0.08-0.10/0.30-0.52

## 2024-01-18 NOTE — CARE PLAN
The patient is Stable - Low risk of patient condition declining or worsening    Shift Goals  Clinical Goals: hemodynamic stability  Patient Goals: comfort    Progress made toward(s) clinical / shift goals:    Problem: Knowledge Deficit - Standard  Goal: Patient and family/care givers will demonstrate understanding of plan of care, disease process/condition, diagnostic tests and medications  Description: Target End Date:  1-3 days or as soon as patient condition allows    Document in Patient Education    1.  Patient and family/caregiver oriented to unit, equipment, visitation policy and means for communicating concern  2.  Complete/review Learning Assessment  3.  Assess knowledge level of disease process/condition, treatment plan, diagnostic tests and medications  4.  Explain disease process/condition, treatment plan, diagnostic tests and medications  Outcome: Progressing     Problem: Hemodynamics  Goal: Patient's hemodynamics, fluid balance and neurologic status will be stable or improve  Description: Target End Date:  Prior to discharge or change in level of care    Document on Assessment and I/O flowsheet templates    1.  Monitor vital signs, pulse oximetry and cardiac monitor per provider order and/or policy  2.  Maintain blood pressure per provider order  3.  Hemodynamic monitoring per provider order  4.  Manage IV fluids and IV infusions  5.  Monitor intake and output  6.  Daily weights per unit policy or provider order  7.  Assess peripheral pulses and capillary refill  8.  Assess color and body temperature  9.  Position patient for maximum circulation/cardiac output  10. Monitor for signs/symptoms of excessive bleeding  11. Assess mental status, restlessness and changes in level of consciousness  12. Monitor temperature and report fever or hypothermia to provider immediately. Consideration of targeted temperature management.  Outcome: Progressing     Problem: Fluid Volume  Goal: Fluid volume balance will be  maintained  Description: Target End Date:  Prior to discharge or change in level of care    Document on I/O flowsheet    1.  Monitor intake and output as ordered  2.  Promote oral intake as appropriate  3.  Report inadequate intake or output to physician  4.  Administer IV therapy as ordered  5.  Weights per provider order  6.  Assess for signs and symptoms of bleeding  7.  Monitor for signs of fluid overload (respiratory changes, edema, weight gain, increased abdominal girth)  8.  Monitor of signs for inadequate fluid volume (poor skin turgor, dry mucous membranes)  9.  Instruct patient on adherence to fluid restrictions  Outcome: Progressing     Problem: Urinary - Renal Perfusion  Goal: Ability to achieve and maintain adequate renal perfusion and functioning will improve  Description: Target End Date:  Prior to discharge or change in level of care    Document on I/O and Assessment flowsheet    1.  Urine output will remain greater than 0.5ml/Kg/HR  2.  Monitor amount and/or characteristics of urine per order/policy. Specific gravity per order/policy  3.  Assess signs and symptoms of renal dysfunction  Outcome: Progressing     Problem: Physical Regulation  Goal: Diagnostic test results will improve  Description: Target End Date:  Prior to discharge or change in level of care    1.  Monitor lactic acid levels  2.  Monitor ABG's  3.  Monitor diagnostic test results  Outcome: Progressing     Problem: Pain - Standard  Goal: Alleviation of pain or a reduction in pain to the patient’s comfort goal  Description: Target End Date:  Prior to discharge or change in level of care    Document on Vitals flowsheet    1.  Document pain using the appropriate pain scale per order or unit policy  2.  Educate and implement non-pharmacologic comfort measures (i.e. relaxation, distraction, massage, cold/heat therapy, etc.)  3.  Pain management medications as ordered  4.  Reassess pain after pain med administration per policy  5.  If  opiods administered assess patient's response to pain medication is appropriate per POSS sedation scale  6.  Follow pain management plan developed in collaboration with patient and interdisciplinary team (including palliative care or pain specialists if applicable)  Outcome: Progressing       Patient is not progressing towards the following goals:

## 2024-01-19 ENCOUNTER — TELEPHONE (OUTPATIENT)
Dept: HEALTH INFORMATION MANAGEMENT | Facility: OTHER | Age: 34
End: 2024-01-19

## 2024-01-19 NOTE — DISCHARGE SUMMARY
Discharge Summary    CHIEF COMPLAINT ON ADMISSION  Chief Complaint   Patient presents with    Chest Pain     PT reports she had acute onset of CP yesterday evening, PT reports the CP + SOB has been increasing in intensity since yesterday. PT denies cardiac hx. PT reports she has had flu like symptom x 3 weeks prior to onset of severe CP. Hx of pneumonia leading to hospitalization in past.        Reason for Admission  Sob, High Heart Rate     Admission Date  1/16/2024    CODE STATUS  Full Code    HPI & HOSPITAL COURSE  This is a 34 y.o. female with pmhx of asthma who presented 1/16/2024 with shortness of breath with flulike symptoms including cough, myalgias.  Patient reports she lives with 1 other person.  Reports she only uses asthma when she needs to.  In the ER, patient noted to be tachycardic chest x-ray done showing no acute abnormality.  Viral panel returned positive for RSV.  D-dimer sent was elevated prompting a CTA of the chest which was done showing atypical infiltrates hazy reticular pulmonary infiltrates.  Patient started on empiric antibiotics admitted to medicine for hypoxic respiratory failure secondary to RSV. Her procalcitonin negative so this is mot likely viral then bacterial, she was continued on steroids and also started on low dose metoprolol for her tachycardia.  Her symptoms have improved she is not requiring oxygen anymore.  Patient feeling well today . She will be discharged home today     The patient met 2-midnight criteria for an inpatient stay at the time of discharge.    Discharge Date  1/18/2024    FOLLOW UP ITEMS POST DISCHARGE  PCP     DISCHARGE DIAGNOSES  Principal Problem:    Pneumonia due to organism (POA: Yes)  Active Problems:    RSV (acute bronchiolitis due to respiratory syncytial virus) (POA: Unknown)    Acute respiratory failure (HCC) (POA: Unknown)    Acute asthma exacerbation (POA: Unknown)  Resolved Problems:    * No resolved hospital problems. *      FOLLOW UP  No future  appointments.  [facility]  [address] [id number] [phone]    Schedule an appointment as soon as possible for a visit  For hospital follow-up      MEDICATIONS ON DISCHARGE     Medication List        START taking these medications        Instructions   metoprolol tartrate 25 MG Tabs  Commonly known as: Lopressor   Take 0.5 Tablets by mouth 2 times a day.  Dose: 12.5 mg     predniSONE 20 MG Tabs  Commonly known as: Deltasone   Take 2 Tablets by mouth every day for 3 days.  Dose: 40 mg            CONTINUE taking these medications        Instructions   albuterol 108 (90 Base) MCG/ACT Aers inhalation aerosol   Inhale 2 Puffs every 6 hours as needed for Shortness of Breath.  Dose: 2 Puff     Guaifenesin 1200 MG Tb12   Take 1 Tablet by mouth every 12 hours.  Dose: 1,200 mg     ipratropium-albuterol 0.5-2.5 (3) MG/3ML nebulizer solution  Commonly known as: Duoneb   Take 3 mL by nebulization every 6 hours as needed for Shortness of Breath.  Dose: 3 mL     levonorgestrel-ethinyl estradiol 0.1-20 MG-MCG per tablet  Commonly known as: Aviane   Doctor's comments: Will need refills every 3 weeks for continuous dosing  Take 1 Tablet by mouth every day. To be taken continuously skipping placebo pills.  Dose: 1 Tablet     SUMAtriptan 50 MG Tabs  Commonly known as: Imitrex   Take 1 Tablet by mouth one time as needed for Migraine for up to 1 dose. May repeat dose one time after 2 hours  Dose: 50 mg            STOP taking these medications      fluconazole 150 MG tablet  Commonly known as: Diflucan     levoFLOXacin 750 MG tablet  Commonly known as: Levaquin     Probiotic (Lactobacillus) Caps              Allergies  Allergies   Allergen Reactions    Stratton (Diagnostic) Hives, Shortness of Breath, Rash, Itching, Swelling and Anxiety     Other reaction(s): Unspecified    Stratton Meal Unspecified          Cinnamon Hives, Shortness of Breath, Rash, Itching, Swelling and Anxiety             DIET  Orders  Placed This Encounter   Procedures    Diet Order Diet: Regular (Vegetarian diet); Miscellaneous modifications: (optional): Vegetarian     Standing Status:   Standing     Number of Occurrences:   1     Order Specific Question:   Diet:     Answer:   Regular [1]     Comments:   Vegetarian diet     Order Specific Question:   Miscellaneous modifications: (optional)     Answer:   Vegetarian [13]       ACTIVITY  As tolerated.  Weight bearing as tolerated    CONSULTATIONS  None     PROCEDURES  None     LABORATORY  Lab Results   Component Value Date    SODIUM 140 01/18/2024    POTASSIUM 3.5 (L) 01/18/2024    CHLORIDE 105 01/18/2024    CO2 23 01/18/2024    GLUCOSE 120 (H) 01/18/2024    BUN 5 (L) 01/18/2024    CREATININE 0.63 01/18/2024        Lab Results   Component Value Date    WBC 11.0 (H) 01/18/2024    HEMOGLOBIN 14.1 01/18/2024    HEMATOCRIT 42.3 01/18/2024    PLATELETCT 159 (L) 01/18/2024        Total time of the discharge process exceeds 35 minutes.

## 2024-01-21 LAB
BACTERIA BLD CULT: NORMAL
BACTERIA BLD CULT: NORMAL
SIGNIFICANT IND 70042: NORMAL
SIGNIFICANT IND 70042: NORMAL
SITE SITE: NORMAL
SITE SITE: NORMAL
SOURCE SOURCE: NORMAL
SOURCE SOURCE: NORMAL

## 2024-01-24 ENCOUNTER — TELEPHONE (OUTPATIENT)
Dept: GYNECOLOGY | Facility: CLINIC | Age: 34
End: 2024-01-24
Payer: MEDICAID

## 2024-01-24 NOTE — TELEPHONE ENCOUNTER
Pt called to request MA call [facility] pharmacy. Pt stated [facility] would not refill prescription due to refill request before 30 days. Dr. Branch did list on prescription that a 3 week fill is approved due to pt not taking placebo pills. [facility] pharmacy was contacted to clarify. Pharmacy tech stated notes were not transmitted and is unable to fill. Pharmacy tech advised to send new prescription with notes in order to refill. Jelly Cuevas MA Was then contacted to call [facility] pharmacy to verbal the prescription. Encounter was routed to JOHN Reaves to complete and document.

## 2024-01-24 NOTE — TELEPHONE ENCOUNTER
I called [facility] and gave a verbal to change the sig for the prescription. Pharmacist stated even with the change of the sig the insurance will not pay for the prescription until the 28th. I contacted Kenneth who will contact the pt.

## 2024-02-02 ENCOUNTER — OFFICE VISIT (OUTPATIENT)
Dept: MEDICAL GROUP | Facility: MEDICAL CENTER | Age: 34
End: 2024-02-02
Attending: NURSE PRACTITIONER
Payer: MEDICAID

## 2024-02-02 ENCOUNTER — HOSPITAL ENCOUNTER (OUTPATIENT)
Facility: MEDICAL CENTER | Age: 34
End: 2024-02-02
Attending: NURSE PRACTITIONER
Payer: MEDICAID

## 2024-02-02 VITALS
TEMPERATURE: 97.6 F | OXYGEN SATURATION: 97 % | HEIGHT: 65 IN | WEIGHT: 109.7 LBS | HEART RATE: 78 BPM | SYSTOLIC BLOOD PRESSURE: 104 MMHG | BODY MASS INDEX: 18.28 KG/M2 | RESPIRATION RATE: 16 BRPM | DIASTOLIC BLOOD PRESSURE: 60 MMHG

## 2024-02-02 DIAGNOSIS — N89.8 VAGINAL ITCHING: ICD-10-CM

## 2024-02-02 DIAGNOSIS — R30.0 BURNING WITH URINATION: ICD-10-CM

## 2024-02-02 LAB
AMBIGUOUS DTTM AMBI4: NORMAL
APPEARANCE UR: CLEAR
BILIRUB UR STRIP-MCNC: ABNORMAL MG/DL
COLOR UR AUTO: ABNORMAL
GLUCOSE UR STRIP.AUTO-MCNC: ABNORMAL MG/DL
KETONES UR STRIP.AUTO-MCNC: ABNORMAL MG/DL
LEUKOCYTE ESTERASE UR QL STRIP.AUTO: ABNORMAL
NITRITE UR QL STRIP.AUTO: 30
PH UR STRIP.AUTO: 2.5 [PH] (ref 5–8)
PROT UR QL STRIP: ABNORMAL MG/DL
RBC UR QL AUTO: ABNORMAL
SP GR UR STRIP.AUTO: 1.02
UROBILINOGEN UR STRIP-MCNC: 6.5 MG/DL

## 2024-02-02 PROCEDURE — 3074F SYST BP LT 130 MM HG: CPT | Performed by: NURSE PRACTITIONER

## 2024-02-02 PROCEDURE — 99213 OFFICE O/P EST LOW 20 MIN: CPT | Performed by: NURSE PRACTITIONER

## 2024-02-02 PROCEDURE — 99214 OFFICE O/P EST MOD 30 MIN: CPT | Performed by: NURSE PRACTITIONER

## 2024-02-02 PROCEDURE — 81002 URINALYSIS NONAUTO W/O SCOPE: CPT | Performed by: NURSE PRACTITIONER

## 2024-02-02 PROCEDURE — 87510 GARDNER VAG DNA DIR PROBE: CPT

## 2024-02-02 PROCEDURE — 3078F DIAST BP <80 MM HG: CPT | Performed by: NURSE PRACTITIONER

## 2024-02-02 PROCEDURE — 99001 SPECIMEN HANDLING PT-LAB: CPT | Performed by: NURSE PRACTITIONER

## 2024-02-02 PROCEDURE — 87480 CANDIDA DNA DIR PROBE: CPT

## 2024-02-02 PROCEDURE — 87660 TRICHOMONAS VAGIN DIR PROBE: CPT

## 2024-02-02 RX ORDER — METRONIDAZOLE 500 MG/1
500 TABLET ORAL
Qty: 14 TABLET | Refills: 0 | Status: SHIPPED | OUTPATIENT
Start: 2024-02-02

## 2024-02-02 ASSESSMENT — FIBROSIS 4 INDEX: FIB4 SCORE: 1.39

## 2024-02-03 LAB
CANDIDA DNA VAG QL PROBE+SIG AMP: NEGATIVE
G VAGINALIS DNA VAG QL PROBE+SIG AMP: NEGATIVE
T VAGINALIS DNA VAG QL PROBE+SIG AMP: NEGATIVE

## 2024-02-05 PROBLEM — N89.8 VAGINAL ITCHING: Status: ACTIVE | Noted: 2024-02-05

## 2024-02-06 DIAGNOSIS — N89.8 VAGINAL ITCHING: ICD-10-CM

## 2024-02-06 DIAGNOSIS — Z11.3 ROUTINE SCREENING FOR STI (SEXUALLY TRANSMITTED INFECTION): ICD-10-CM

## 2024-02-06 NOTE — ASSESSMENT & PLAN NOTE
Ongoing-   Vag DNA swab collected  Will start on flagyl given previous symptoms.  POCT UA  collected and no signs of UTI

## 2024-02-06 NOTE — PROGRESS NOTES
Chief Complaint   Patient presents with    Vaginitis       Subjective:     HPI:   Aminata Lorenzana is a 34 y.o. female here to discuss the evaluation and management of:      Problem   Vaginal Itching    Patient states she has been having vaginal itching. She states that it hurts when she pees like a burning sensation but on the outside. She feels this is very similar to yeast infections she has had in the past.          ROS  See HPI     Allergies   Allergen Reactions    Slaughter (Diagnostic) Hives, Shortness of Breath, Rash, Itching, Swelling and Anxiety     Other reaction(s): Unspecified    Slaughter Meal Unspecified          Cinnamon Hives, Shortness of Breath, Rash, Itching, Swelling and Anxiety             Current medicines (including changes today)  Current Outpatient Medications   Medication Sig Dispense Refill    metroNIDAZOLE (FLAGYL) 500 MG Tab Take 1 Tablet by mouth 2 times a day. 14 Tablet 0    levonorgestrel-ethinyl estradiol (AVIANE) 0.1-20 MG-MCG per tablet Take 1 Tablet by mouth every day. To be taken continuously skipping placebo pills. 84 Tablet 5    guaiFENesin ER 1200 MG TABLET SR 12 HR Take 1 Tablet by mouth every 12 hours. 20 Tablet 0    albuterol 108 (90 Base) MCG/ACT Aero Soln inhalation aerosol Inhale 2 Puffs every 6 hours as needed for Shortness of Breath. 8.5 g 1    metoprolol tartrate (LOPRESSOR) 25 MG Tab Take 0.5 Tablets by mouth 2 times a day. 60 Tablet 0    ipratropium-albuterol (DUONEB) 0.5-2.5 (3) MG/3ML nebulizer solution Take 3 mL by nebulization every 6 hours as needed for Shortness of Breath. 50 Each 1    SUMAtriptan (IMITREX) 50 MG Tab Take 1 Tablet by mouth one time as needed for Migraine for up to 1 dose. May repeat dose one time after 2 hours 10 Tablet 3     No current facility-administered medications for this visit.       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    Migraine with aura and without status migrainosus, not intractable 05/16/2023    Menorrhagia with regular cycle 05/16/2023    Painful menstrual periods 05/16/2023    Neck pain 04/26/2023    UTI symptoms 04/26/2023    Encounter to establish care 04/26/2023       Family History   Problem Relation Age of Onset    Stroke Father     Diabetes Maternal Grandmother     Diabetes Paternal Grandfather           Objective:     /60 (BP Location: Right arm, Patient Position: Sitting, BP Cuff Size: Adult)   Pulse 78   Temp 36.4 °C (97.6 °F) (Temporal)   Resp 16   Ht 1.651 m (5' 5\")   Wt 49.8 kg (109 lb 11.2 oz)   SpO2 97%  Body mass index is 18.26 kg/m².    Physical Exam:  Physical Exam  Vitals reviewed.   Constitutional:       General: She is awake.      Appearance: Normal appearance. She is well-developed.   HENT:      Head: Normocephalic.   Eyes:      Conjunctiva/sclera: Conjunctivae normal.   Cardiovascular:      Rate and Rhythm: Normal rate.   Pulmonary:      Effort: Pulmonary effort is normal. No respiratory distress.   Musculoskeletal:      Cervical back: Neck supple.   Skin:     General: Skin is warm and dry.   Neurological:      Mental Status: She is alert and oriented to person, place, and time.   Psychiatric:         Mood and Affect: Mood normal.         Behavior: Behavior normal. Behavior is cooperative.              Assessment and Plan:     The following treatment plan was discussed:    Problem List Items Addressed This Visit       Vaginal " itching     Ongoing-   Vag DNA swab collected  Will start on flagyl given previous symptoms.  POCT UA  collected and no signs of UTI            Relevant Medications    metroNIDAZOLE (FLAGYL) 500 MG Tab    Other Relevant Orders    VAGINAL PATHOGENS DNA PANEL    VAGINAL PATHOGENS DNA PANEL (Completed)     Other Visit Diagnoses       Burning with urination        Relevant Medications    metroNIDAZOLE (FLAGYL) 500 MG Tab    Other Relevant Orders    POCT Urinalysis (Completed)    VAGINAL PATHOGENS DNA PANEL            Any change or worsening of signs or symptoms, patient encouraged to follow-up or report to emergency room for further evaluation. Patient verbalizes understanding and agrees.    Follow-Up:Follow up as needed       PLEASE NOTE: This dictation was created using voice recognition software. I have made every reasonable attempt to correct obvious errors, but I expect that there are errors of grammar and possibly content that I did not discover before finalizing the note.

## 2024-02-10 DIAGNOSIS — N92.0 MENORRHAGIA WITH REGULAR CYCLE: ICD-10-CM

## 2024-02-10 DIAGNOSIS — Z30.41 ENCOUNTER FOR SURVEILLANCE OF CONTRACEPTIVE PILLS: ICD-10-CM

## 2024-02-10 DIAGNOSIS — N94.6 PAINFUL MENSTRUAL PERIODS: ICD-10-CM

## 2024-02-14 RX ORDER — LEVONORGESTREL AND ETHINYL ESTRADIOL 0.1-0.02MG
1 KIT ORAL DAILY
Qty: 84 TABLET | Refills: 5 | Status: SHIPPED | OUTPATIENT
Start: 2024-02-14 | End: 2024-02-16 | Stop reason: SDUPTHER

## 2024-02-16 DIAGNOSIS — N94.6 PAINFUL MENSTRUAL PERIODS: ICD-10-CM

## 2024-02-16 DIAGNOSIS — N92.0 MENORRHAGIA WITH REGULAR CYCLE: ICD-10-CM

## 2024-02-16 DIAGNOSIS — Z30.41 ENCOUNTER FOR SURVEILLANCE OF CONTRACEPTIVE PILLS: ICD-10-CM

## 2024-02-16 RX ORDER — LEVONORGESTREL AND ETHINYL ESTRADIOL 0.1-0.02MG
1 KIT ORAL DAILY
Qty: 400 TABLET | Refills: 5 | Status: SHIPPED | OUTPATIENT
Start: 2024-02-16

## 2024-03-29 ENCOUNTER — OFFICE VISIT (OUTPATIENT)
Dept: MEDICAL GROUP | Facility: MEDICAL CENTER | Age: 34
End: 2024-03-29
Payer: MEDICAID

## 2024-03-29 VITALS
HEIGHT: 65 IN | SYSTOLIC BLOOD PRESSURE: 98 MMHG | RESPIRATION RATE: 16 BRPM | DIASTOLIC BLOOD PRESSURE: 78 MMHG | OXYGEN SATURATION: 97 % | HEART RATE: 94 BPM | WEIGHT: 109.5 LBS | TEMPERATURE: 98.3 F | BODY MASS INDEX: 18.24 KG/M2

## 2024-03-29 DIAGNOSIS — R19.7 DIARRHEA OF PRESUMED INFECTIOUS ORIGIN: ICD-10-CM

## 2024-03-29 DIAGNOSIS — A09 TRAVELER'S DIARRHEA: ICD-10-CM

## 2024-03-29 PROCEDURE — 99212 OFFICE O/P EST SF 10 MIN: CPT

## 2024-03-29 RX ORDER — AZITHROMYCIN 500 MG/1
1000 TABLET, FILM COATED ORAL ONCE
Qty: 2 TABLET | Refills: 0 | Status: SHIPPED | OUTPATIENT
Start: 2024-03-29 | End: 2024-03-29

## 2024-03-29 ASSESSMENT — FIBROSIS 4 INDEX: FIB4 SCORE: 1.39

## 2024-03-29 NOTE — PROGRESS NOTES
"Subjective:     CC: 6 days of diarrhea    HPI:   Aminata presents today with    Diarrhea of presumed infectious origin  Patient was recently in the [facility] [place], when she was coming home she developed diarrhea. She is having up to 4-5 bowel movements daily. She report that eating triggers the diarrhea. She denies any vomiting but is nauseated throughout the day. But she is able to keep food and fluids down. No one else she travelled with has been sick.          ROS:  - CONSTITUTIONAL: Denies weight loss, fever and chills.  - HEENT: Denies changes in vision and hearing.  - RESPIRATORY: Denies SOB and cough.  - CV: Denies palpitations and CP.  - GI: Denies abdominal pain, nausea, vomiting and diarrhea.  - : Denies dysuria and urinary frequency.  - MSK: Denies myalgia and joint pain.  - SKIN: Denies rash and pruritus.  - NEUROLOGICAL: Denies headache and syncope.  - PSYCHIATRIC: Denies recent changes in mood. Denies anxiety and depression.    Please see HPI for additional ROS.       Objective:     Exam:  BP 98/78 (BP Location: Right arm, Patient Position: Sitting, BP Cuff Size: Adult)   Pulse 94   Temp 36.8 °C (98.3 °F) (Temporal)   Resp 16   Ht 1.651 m (5' 5\")   Wt 49.7 kg (109 lb 8 oz)   SpO2 97%   BMI 18.22 kg/m²  Body mass index is 18.22 kg/m².    Physical Exam:  Constitutional: Alert, no distress, well-groomed.  Skin: Warm, dry, good turgor, no rashes in visible areas.  Eye: Equal, round and reactive, conjunctiva clear, lids normal.  ENMT: Lips without lesions, good dentition, moist mucous membranes.  Neck: Trachea midline, no masses, no thyromegaly.  Respiratory: Unlabored respiratory effort, no cough.  Abd: soft, non tender, non distended, normal BS  MSK: Normal gait, moves all extremities.  Neuro: Grossly non-focal.   Psych: Alert and oriented x3, normal affect and mood.    Labs: Reviewed    Assessment & Plan:     34 y.o. female with the following -     1. Diarrhea of presumed infectious " origin  2. Traveler's diarrhea  Acute issue.  Given her symptom onset and severity, we will treat her with 1000 mg of azithromycin once.  We just did discuss that if her symptoms do not improve that she may need treatment for 3 days and she should follow-up.  At this point she is able to keep fluids and food down she does feel dehydrated.  It was recommended that if she had any lightheadedness or dizziness she should follow-up in the ER.  Precautions given.  - azithromycin (ZITHROMAX) 500 MG tablet; Take 2 Tablets by mouth one time for 1 dose.  Dispense: 2 Tablet; Refill: 0      Return if symptoms worsen or fail to improve.    Please note that this dictation was created using voice recognition software. I have made every reasonable attempt to correct obvious errors, but I expect that there are errors of grammar and possibly content that I did not discover before finalizing the note.

## 2024-03-29 NOTE — ASSESSMENT & PLAN NOTE
Patient was recently in the [facility] [place], when she was coming home she developed diarrhea. She is having up to 4-5 bowel movements daily. She report that eating triggers the diarrhea. She denies any vomiting but is nauseated throughout the day. But she is able to keep food and fluids down. No one else she travelled with has been sick.

## 2024-08-10 DIAGNOSIS — G43.909 MIGRAINE WITHOUT STATUS MIGRAINOSUS, NOT INTRACTABLE, UNSPECIFIED MIGRAINE TYPE: ICD-10-CM

## 2024-08-12 NOTE — TELEPHONE ENCOUNTER
Received request via: Pharmacy    Was the patient seen in the last year in this department? Yes    Does the patient have an active prescription (recently filled or refills available) for medication(s) requested? No    Ph[facility] Name: [facility]    Does the patient have [facility] and need 100-day supply? (This applies to ALL medications) Patient does not have SCP    Future Appointments         Provider Department Center    8/26/2024 8:30 AM (Arrive by 8:15 AM) Kristine Osborn M.D. [facility]

## 2024-08-16 RX ORDER — SUMATRIPTAN 50 MG/1
TABLET, FILM COATED ORAL
Qty: 9 TABLET | Refills: 0 | Status: SHIPPED | OUTPATIENT
Start: 2024-08-16

## 2024-08-26 ENCOUNTER — APPOINTMENT (OUTPATIENT)
Dept: OBGYN | Facility: CLINIC | Age: 34
End: 2024-08-26
Payer: MEDICAID

## 2024-09-19 DIAGNOSIS — G43.909 MIGRAINE WITHOUT STATUS MIGRAINOSUS, NOT INTRACTABLE, UNSPECIFIED MIGRAINE TYPE: ICD-10-CM

## 2024-09-19 NOTE — TELEPHONE ENCOUNTER
Received request via: Pharmacy    Was the patient seen in the last year in this department? Yes    Does the patient have an active prescription (recently filled or refills available) for medication(s) requested? No    Ph[facility]    Does the patient have [facility] and need 100-day supply? (This applies to ALL medications) Patient does not have SCP    Future Appointments         Provider Department Center    10/29/2024 2:45 PM (Arrive by 2:30 PM) Kristine Osborn M.D. [facility]

## 2024-09-23 ENCOUNTER — APPOINTMENT (OUTPATIENT)
Dept: RADIOLOGY | Facility: MEDICAL CENTER | Age: 34
End: 2024-09-23
Attending: STUDENT IN AN ORGANIZED HEALTH CARE EDUCATION/TRAINING PROGRAM
Payer: OTHER MISCELLANEOUS

## 2024-09-23 ENCOUNTER — HOSPITAL ENCOUNTER (EMERGENCY)
Facility: MEDICAL CENTER | Age: 34
End: 2024-09-24
Attending: STUDENT IN AN ORGANIZED HEALTH CARE EDUCATION/TRAINING PROGRAM
Payer: OTHER MISCELLANEOUS

## 2024-09-23 DIAGNOSIS — S16.1XXA STRAIN OF NECK MUSCLE, INITIAL ENCOUNTER: ICD-10-CM

## 2024-09-23 DIAGNOSIS — S09.90XA CLOSED HEAD INJURY, INITIAL ENCOUNTER: ICD-10-CM

## 2024-09-23 PROCEDURE — 99284 EMERGENCY DEPT VISIT MOD MDM: CPT

## 2024-09-23 PROCEDURE — 72125 CT NECK SPINE W/O DYE: CPT

## 2024-09-23 PROCEDURE — 70450 CT HEAD/BRAIN W/O DYE: CPT

## 2024-09-23 RX ORDER — ACETAMINOPHEN 500 MG
1000 TABLET ORAL ONCE
Status: COMPLETED | OUTPATIENT
Start: 2024-09-24 | End: 2024-09-24

## 2024-09-23 RX ORDER — METHOCARBAMOL 500 MG/1
500 TABLET, FILM COATED ORAL ONCE
Status: COMPLETED | OUTPATIENT
Start: 2024-09-24 | End: 2024-09-24

## 2024-09-23 RX ORDER — SUMATRIPTAN 50 MG/1
TABLET, FILM COATED ORAL
Qty: 9 TABLET | Refills: 0 | Status: SHIPPED | OUTPATIENT
Start: 2024-09-23

## 2024-09-23 ASSESSMENT — PAIN DESCRIPTION - PAIN TYPE: TYPE: ACUTE PAIN

## 2024-09-23 ASSESSMENT — FIBROSIS 4 INDEX: FIB4 SCORE: 1.39

## 2024-09-24 VITALS
TEMPERATURE: 98 F | BODY MASS INDEX: 19.83 KG/M2 | RESPIRATION RATE: 18 BRPM | OXYGEN SATURATION: 98 % | DIASTOLIC BLOOD PRESSURE: 82 MMHG | HEIGHT: 65 IN | HEART RATE: 69 BPM | WEIGHT: 119.05 LBS | SYSTOLIC BLOOD PRESSURE: 120 MMHG

## 2024-09-24 PROCEDURE — 700102 HCHG RX REV CODE 250 W/ 637 OVERRIDE(OP): Mod: UD | Performed by: STUDENT IN AN ORGANIZED HEALTH CARE EDUCATION/TRAINING PROGRAM

## 2024-09-24 PROCEDURE — A9270 NON-COVERED ITEM OR SERVICE: HCPCS | Mod: UD | Performed by: STUDENT IN AN ORGANIZED HEALTH CARE EDUCATION/TRAINING PROGRAM

## 2024-09-24 RX ORDER — METHOCARBAMOL 500 MG/1
500 TABLET, FILM COATED ORAL 3 TIMES DAILY
Qty: 30 TABLET | Refills: 0 | Status: SHIPPED | OUTPATIENT
Start: 2024-09-24 | End: 2024-10-29

## 2024-09-24 RX ORDER — METHOCARBAMOL 500 MG/1
500 TABLET, FILM COATED ORAL 3 TIMES DAILY
Qty: 30 TABLET | Refills: 0 | Status: SHIPPED | OUTPATIENT
Start: 2024-09-24 | End: 2024-09-24

## 2024-09-24 RX ADMIN — METHOCARBAMOL 500 MG: 500 TABLET ORAL at 00:11

## 2024-09-24 RX ADMIN — ACETAMINOPHEN 1000 MG: 500 TABLET ORAL at 00:12
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
## 2024-09-24 NOTE — ED NOTES
Taken patient from triage waiting room, ambulatory with steady gait, alert/ oriented x 4.Verified patient identification.  Assumed patient care.   Placed on patient room. Changed clothes to hospital gown. Connected to cardiac monitor.   Given the call light and instructed to call for any assistance needed/ or concerns.   Bed on lowest position, side rails up, breaks locked. Awaiting for ERP.      C-collar in place from triage.

## 2024-09-24 NOTE — ED NOTES
Vital signs taken and recorded. Discharge in stable condition ambulatory accompanied by spouse. Health teachings given to patient and family with full understanding of the information given. No personal belongings left.
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
## 2024-10-21 DIAGNOSIS — G43.909 MIGRAINE WITHOUT STATUS MIGRAINOSUS, NOT INTRACTABLE, UNSPECIFIED MIGRAINE TYPE: ICD-10-CM

## 2024-10-22 RX ORDER — SUMATRIPTAN 50 MG/1
TABLET, FILM COATED ORAL
Qty: 9 TABLET | Refills: 0 | Status: SHIPPED | OUTPATIENT
Start: 2024-10-22

## 2024-10-29 ENCOUNTER — APPOINTMENT (OUTPATIENT)
Dept: OBGYN | Facility: CLINIC | Age: 34
End: 2024-10-29
Payer: MEDICAID

## 2024-10-29 VITALS
HEIGHT: 65 IN | SYSTOLIC BLOOD PRESSURE: 110 MMHG | DIASTOLIC BLOOD PRESSURE: 70 MMHG | WEIGHT: 114.2 LBS | BODY MASS INDEX: 19.03 KG/M2

## 2024-10-29 DIAGNOSIS — N94.6 DYSMENORRHEA: ICD-10-CM

## 2024-10-29 DIAGNOSIS — N92.0 MENORRHAGIA WITH REGULAR CYCLE: ICD-10-CM

## 2024-10-29 DIAGNOSIS — Q51.9 UTERINE ANOMALY: ICD-10-CM

## 2024-10-29 ASSESSMENT — FIBROSIS 4 INDEX: FIB4 SCORE: 1.39

## 2024-11-06 ENCOUNTER — OFFICE VISIT (OUTPATIENT)
Dept: URGENT CARE | Facility: CLINIC | Age: 34
End: 2024-11-06
Payer: MEDICAID

## 2024-11-06 ENCOUNTER — HOSPITAL ENCOUNTER (OUTPATIENT)
Facility: MEDICAL CENTER | Age: 34
End: 2024-11-06
Payer: MEDICAID

## 2024-11-06 VITALS
HEART RATE: 100 BPM | OXYGEN SATURATION: 100 % | HEIGHT: 65 IN | BODY MASS INDEX: 20.01 KG/M2 | RESPIRATION RATE: 16 BRPM | DIASTOLIC BLOOD PRESSURE: 80 MMHG | TEMPERATURE: 98.1 F | SYSTOLIC BLOOD PRESSURE: 110 MMHG | WEIGHT: 120.1 LBS

## 2024-11-06 DIAGNOSIS — N89.8 VAGINAL ITCHING: ICD-10-CM

## 2024-11-06 DIAGNOSIS — N30.01 ACUTE CYSTITIS WITH HEMATURIA: ICD-10-CM

## 2024-11-06 LAB
APPEARANCE UR: NORMAL
BILIRUB UR STRIP-MCNC: NEGATIVE MG/DL
COLOR UR AUTO: NORMAL
GLUCOSE UR STRIP.AUTO-MCNC: NEGATIVE MG/DL
KETONES UR STRIP.AUTO-MCNC: NORMAL MG/DL
LEUKOCYTE ESTERASE UR QL STRIP.AUTO: NORMAL
NITRITE UR QL STRIP.AUTO: NEGATIVE
PH UR STRIP.AUTO: 6 [PH] (ref 5–8)
POCT INT CON NEG: NEGATIVE
POCT INT CON POS: POSITIVE
POCT URINE PREGNANCY TEST: NEGATIVE
PROT UR QL STRIP: NORMAL MG/DL
RBC UR QL AUTO: NORMAL
SP GR UR STRIP.AUTO: >=1.03
UROBILINOGEN UR STRIP-MCNC: NORMAL MG/DL

## 2024-11-06 PROCEDURE — 87510 GARDNER VAG DNA DIR PROBE: CPT

## 2024-11-06 PROCEDURE — 99214 OFFICE O/P EST MOD 30 MIN: CPT

## 2024-11-06 PROCEDURE — 81025 URINE PREGNANCY TEST: CPT

## 2024-11-06 PROCEDURE — 87660 TRICHOMONAS VAGIN DIR PROBE: CPT

## 2024-11-06 PROCEDURE — 87480 CANDIDA DNA DIR PROBE: CPT

## 2024-11-06 PROCEDURE — 81002 URINALYSIS NONAUTO W/O SCOPE: CPT

## 2024-11-06 PROCEDURE — 87077 CULTURE AEROBIC IDENTIFY: CPT

## 2024-11-06 PROCEDURE — 87086 URINE CULTURE/COLONY COUNT: CPT

## 2024-11-06 RX ORDER — NITROFURANTOIN 25; 75 MG/1; MG/1
100 CAPSULE ORAL EVERY 12 HOURS
Qty: 10 CAPSULE | Refills: 0 | Status: SHIPPED | OUTPATIENT
Start: 2024-11-06 | End: 2024-11-11

## 2024-11-06 ASSESSMENT — ENCOUNTER SYMPTOMS
VOMITING: 0
FLANK PAIN: 0
FEVER: 0
ABDOMINAL PAIN: 1
BACK PAIN: 1
NAUSEA: 1

## 2024-11-06 ASSESSMENT — FIBROSIS 4 INDEX: FIB4 SCORE: 1.39

## 2024-11-06 NOTE — PROGRESS NOTES
"Subjective:     CHIEF COMPLAINT  Chief Complaint   Patient presents with    UTI     X 1 week,bloating, slight burning and itching that have now gone away, cramping, urine smell.       HPI  Aminata Lorenzana is a very pleasant 34 y.o. female who presents with vaginal itching, suprapubic discomfort, increased urinary frequency with small volume urinary output, and burning with urination that has been present for 1 week.  She has tried managing her symptoms at home with hydration and \"tinctures\" and does report that the burning with urination has improved.  She has been having low back discomfort and felt nauseous earlier today.  She has not had any measured fevers but did feel slightly feverish last night.  She has not had any vomiting.  She denies a concern for STDs.    REVIEW OF SYSTEMS  Review of Systems   Constitutional:  Negative for fever.   Gastrointestinal:  Positive for abdominal pain (Pelvic) and nausea. Negative for vomiting.   Genitourinary:  Positive for frequency and urgency. Negative for dysuria and flank pain.   Musculoskeletal:  Positive for back pain (Low back).       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    ALLERGIES  Allergies   Allergen Reactions    " "[place] (Diagnostic) Hives, Shortness of Breath, Rash, Itching, Swelling and Anxiety     Other reaction(s): Unspecified    [place] Meal Unspecified          Cinnamon Hives, Shortness of Breath, Rash, Itching, Swelling and Anxiety             CURRENT MEDICATIONS  Home Medications       Reviewed by Nadia Sharp P.A.-C. (Physician Assistant) on 11/06/24 at 1300  Med List Status: <None>     Medication Last Dose Status   albuterol 108 (90 Base) MCG/ACT Aero Soln inhalation aerosol PRN Active   ipratropium-albuterol (DUONEB) 0.5-2.5 (3) MG/3ML nebulizer solution Not Taking Active   levonorgestrel-ethinyl estradiol (AVIANE) 0.1-20 MG-MCG per tablet Taking Active   SUMAtriptan (IMITREX) 50 MG Tab PRN Active                    SOCIAL HISTORY  Social History     Tobacco Use    Smoking status: Never    Smokeless tobacco: Never   Vaping Use    Vaping status: Never Used   Substance and Sexual Activity    Alcohol use: Yes     Comment: \"once a week\"    Drug use: Yes     Types: Marijuana, Inhaled    Sexual activity: Yes     Partners: Male     Birth control/protection: Pill       FAMILY HISTORY  Family History   Problem Relation Age of Onset    Stroke Father     Diabetes Maternal Grandmother     Diabetes Paternal Grandfather           Objective:     VITAL SIGNS: /80   Pulse 100   Temp 36.7 °C (98.1 °F) (Temporal)   Resp 16   Ht 1.651 m (5' 5\")   Wt 54.5 kg (120 lb 1.6 oz)   LMP 08/14/2024   SpO2 100%   BMI 19.99 kg/m²     PHYSICAL EXAM  Physical Exam  Vitals reviewed.   Constitutional:       General: She is not in acute distress.     Appearance: Normal appearance. She is not ill-appearing or toxic-appearing.   HENT:      Head: Normocephalic and atraumatic.      Nose: Nose normal.      Mouth/Throat:      Mouth: Mucous membranes are moist.   Eyes:      Conjunctiva/sclera: Conjunctivae normal.   Cardiovascular:      Rate and Rhythm: Normal rate.   Pulmonary:      Effort: Pulmonary effort is normal.   Abdominal:      " Tenderness: There is no right CVA tenderness or left CVA tenderness.   Skin:     General: Skin is warm and dry.      Coloration: Skin is not pale.   Neurological:      General: No focal deficit present.      Mental Status: She is alert.   Psychiatric:         Mood and Affect: Mood normal.       Assessment/Plan:     1. Acute cystitis with hematuria  - POCT Urinalysis  - POCT Pregnancy  - nitrofurantoin (MACROBID) 100 MG Cap; Take 1 Capsule by mouth every 12 hours for 5 days.  Dispense: 10 Capsule; Refill: 0  - URINE CULTURE(NEW); Future    2. Vaginal itching  - VAGINAL PATHOGENS DNA PANEL; Future  -Increase hydration  -Avoid excess caffeine/alcohol until UTI symptoms resolve  -Return to clinic if symptoms worsen or fail to resolve      MDM/Comments:  Patient has stable vital signs and is non-toxic appearing.  Urinalysis obtained with trace blood and large leukocytes.  Patient has been initiated on Macrobid.  Urine has been sent for culture with results pending.  Patient is afebrile at this time and did not have any flank tenderness to palpation.  A vaginal pathogen swab has been obtained with results pending.  Discussed supportive care with hydration, decreased alcohol and caffeine intake, avoidance of intercourse until UTI symptoms resolve. Patient instructed to complete full course of antibiotic. Will return if body aches, chills, fever, back/flank pain occur. Discussed signs of kidney infection. Patient demonstrated understanding of plan of care and will RTC if symptoms worsen or fail to resolve.     Differential diagnosis, natural history, supportive care, and indications for immediate follow-up discussed. All questions answered. Patient agrees with the plan of care.    Follow-up as needed if symptoms worsen or fail to improve to PCP, Urgent care or Emergency Room.    I have personally reviewed prior external notes and test results pertinent to today's visit.  I have independently reviewed and interpreted all  diagnostics ordered during this urgent care acute visit.   Discussed management options (risks,benefits, and alternatives to treatment). Pt expresses understanding and the treatment plan was agreed upon. Questions were encouraged and answered to pt's satisfaction.    Please note that this dictation was created using voice recognition software. I have made a reasonable attempt to correct obvious errors, but I expect that there are errors of grammar and possibly content that I did not discover before finalizing the note.

## 2024-11-09 LAB
BACTERIA UR CULT: NORMAL
SIGNIFICANT IND 70042: NORMAL
SITE SITE: NORMAL
SOURCE SOURCE: NORMAL

## 2024-11-10 ENCOUNTER — HOSPITAL ENCOUNTER (EMERGENCY)
Facility: MEDICAL CENTER | Age: 34
End: 2024-11-10
Attending: EMERGENCY MEDICINE
Payer: MEDICAID

## 2024-11-10 VITALS
WEIGHT: 117.28 LBS | OXYGEN SATURATION: 99 % | TEMPERATURE: 97.3 F | HEART RATE: 86 BPM | HEIGHT: 65 IN | BODY MASS INDEX: 19.54 KG/M2 | SYSTOLIC BLOOD PRESSURE: 127 MMHG | RESPIRATION RATE: 16 BRPM | DIASTOLIC BLOOD PRESSURE: 71 MMHG

## 2024-11-10 DIAGNOSIS — R10.84 GENERALIZED ABDOMINAL PAIN: ICD-10-CM

## 2024-11-10 DIAGNOSIS — R11.0 NAUSEA: ICD-10-CM

## 2024-11-10 LAB
ALBUMIN SERPL BCP-MCNC: 4.2 G/DL (ref 3.2–4.9)
ALBUMIN/GLOB SERPL: 1.3 G/DL
ALP SERPL-CCNC: 45 U/L (ref 30–99)
ALT SERPL-CCNC: 8 U/L (ref 2–50)
ANION GAP SERPL CALC-SCNC: 14 MMOL/L (ref 7–16)
APPEARANCE UR: CLEAR
AST SERPL-CCNC: 27 U/L (ref 12–45)
BASOPHILS # BLD AUTO: 0.8 % (ref 0–1.8)
BASOPHILS # BLD: 0.09 K/UL (ref 0–0.12)
BILIRUB SERPL-MCNC: 0.3 MG/DL (ref 0.1–1.5)
BILIRUB UR QL STRIP.AUTO: NEGATIVE
BUN SERPL-MCNC: 10 MG/DL (ref 8–22)
CALCIUM ALBUM COR SERPL-MCNC: 8.7 MG/DL (ref 8.5–10.5)
CALCIUM SERPL-MCNC: 8.9 MG/DL (ref 8.5–10.5)
CHLORIDE SERPL-SCNC: 107 MMOL/L (ref 96–112)
CO2 SERPL-SCNC: 17 MMOL/L (ref 20–33)
COLOR UR: YELLOW
CREAT SERPL-MCNC: 0.75 MG/DL (ref 0.5–1.4)
EOSINOPHIL # BLD AUTO: 0.09 K/UL (ref 0–0.51)
EOSINOPHIL NFR BLD: 0.8 % (ref 0–6.9)
ERYTHROCYTE [DISTWIDTH] IN BLOOD BY AUTOMATED COUNT: 43.5 FL (ref 35.9–50)
GFR SERPLBLD CREATININE-BSD FMLA CKD-EPI: 105 ML/MIN/1.73 M 2
GLOBULIN SER CALC-MCNC: 3.2 G/DL (ref 1.9–3.5)
GLUCOSE SERPL-MCNC: 89 MG/DL (ref 65–99)
GLUCOSE UR STRIP.AUTO-MCNC: NEGATIVE MG/DL
HCG SERPL QL: NEGATIVE
HCT VFR BLD AUTO: 44.2 % (ref 37–47)
HGB BLD-MCNC: 14.9 G/DL (ref 12–16)
IMM GRANULOCYTES # BLD AUTO: 0.03 K/UL (ref 0–0.11)
IMM GRANULOCYTES NFR BLD AUTO: 0.3 % (ref 0–0.9)
KETONES UR STRIP.AUTO-MCNC: NEGATIVE MG/DL
LEUKOCYTE ESTERASE UR QL STRIP.AUTO: NEGATIVE
LIPASE SERPL-CCNC: 25 U/L (ref 11–82)
LYMPHOCYTES # BLD AUTO: 1.51 K/UL (ref 1–4.8)
LYMPHOCYTES NFR BLD: 13.9 % (ref 22–41)
MCH RBC QN AUTO: 32.3 PG (ref 27–33)
MCHC RBC AUTO-ENTMCNC: 33.7 G/DL (ref 32.2–35.5)
MCV RBC AUTO: 95.9 FL (ref 81.4–97.8)
MICRO URNS: NORMAL
MONOCYTES # BLD AUTO: 0.58 K/UL (ref 0–0.85)
MONOCYTES NFR BLD AUTO: 5.3 % (ref 0–13.4)
NEUTROPHILS # BLD AUTO: 8.57 K/UL (ref 1.82–7.42)
NEUTROPHILS NFR BLD: 78.9 % (ref 44–72)
NITRITE UR QL STRIP.AUTO: NEGATIVE
NRBC # BLD AUTO: 0 K/UL
NRBC BLD-RTO: 0 /100 WBC (ref 0–0.2)
PH UR STRIP.AUTO: 6 [PH] (ref 5–8)
PLATELET # BLD AUTO: 225 K/UL (ref 164–446)
PMV BLD AUTO: 11 FL (ref 9–12.9)
POTASSIUM SERPL-SCNC: 4.3 MMOL/L (ref 3.6–5.5)
PROT SERPL-MCNC: 7.4 G/DL (ref 6–8.2)
PROT UR QL STRIP: NEGATIVE MG/DL
RBC # BLD AUTO: 4.61 M/UL (ref 4.2–5.4)
RBC UR QL AUTO: NEGATIVE
SODIUM SERPL-SCNC: 138 MMOL/L (ref 135–145)
SP GR UR STRIP.AUTO: 1.02
UROBILINOGEN UR STRIP.AUTO-MCNC: 0.2 EU/DL
WBC # BLD AUTO: 10.9 K/UL (ref 4.8–10.8)

## 2024-11-10 PROCEDURE — 85025 COMPLETE CBC W/AUTO DIFF WBC: CPT

## 2024-11-10 PROCEDURE — 36415 COLL VENOUS BLD VENIPUNCTURE: CPT

## 2024-11-10 PROCEDURE — 84703 CHORIONIC GONADOTROPIN ASSAY: CPT

## 2024-11-10 PROCEDURE — 80053 COMPREHEN METABOLIC PANEL: CPT

## 2024-11-10 PROCEDURE — 83690 ASSAY OF LIPASE: CPT

## 2024-11-10 PROCEDURE — 81003 URINALYSIS AUTO W/O SCOPE: CPT

## 2024-11-10 PROCEDURE — 99284 EMERGENCY DEPT VISIT MOD MDM: CPT

## 2024-11-10 RX ORDER — ONDANSETRON 4 MG/1
4 TABLET, ORALLY DISINTEGRATING ORAL EVERY 6 HOURS PRN
Qty: 10 TABLET | Refills: 0 | Status: SHIPPED | OUTPATIENT
Start: 2024-11-10 | End: 2024-11-12

## 2024-11-10 ASSESSMENT — FIBROSIS 4 INDEX: FIB4 SCORE: 1.39

## 2024-11-10 NOTE — ED NOTES
Patient identity verified in lobby. Patient ambulated independently with steady gait to yellow 64.    This RN agrees with triage note. Patient connected to continuous monitor with call light and personal belongings within reach. Tressa locked and in the lowest position.
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
## 2024-11-10 NOTE — ED TRIAGE NOTES
Vitals:    11/10/24 1141   BP: 118/78   Pulse: 83   Resp: 16   Temp: 36.5 °C (97.7 °F)   SpO2: 99%     Chief Complaint   Patient presents with    Abdominal Pain    N/V    Flank Pain     Pt reports she was seen at her PCP on 11/6 and dx with a UTI, given Macrobid, her PCP called her today and told her that she didn't in fact have a UTI. Pt reports her bilateral low back pain has worsened, she's had previous kidney infections, and was instructed by her PCP to come to the ED for further evaluation.     Pt is ambulatory to and from triage and is alert and oriented x 4.

## 2024-11-12 ENCOUNTER — OFFICE VISIT (OUTPATIENT)
Dept: MEDICAL GROUP | Facility: MEDICAL CENTER | Age: 34
End: 2024-11-12
Attending: NURSE PRACTITIONER
Payer: MEDICAID

## 2024-11-12 VITALS
WEIGHT: 117 LBS | OXYGEN SATURATION: 96 % | SYSTOLIC BLOOD PRESSURE: 100 MMHG | HEART RATE: 85 BPM | BODY MASS INDEX: 19.47 KG/M2 | TEMPERATURE: 98.6 F | RESPIRATION RATE: 16 BRPM | DIASTOLIC BLOOD PRESSURE: 72 MMHG

## 2024-11-12 DIAGNOSIS — Z01.818 PRE-OP EVALUATION: ICD-10-CM

## 2024-11-12 PROCEDURE — 3078F DIAST BP <80 MM HG: CPT | Performed by: NURSE PRACTITIONER

## 2024-11-12 PROCEDURE — 99214 OFFICE O/P EST MOD 30 MIN: CPT | Performed by: NURSE PRACTITIONER

## 2024-11-12 PROCEDURE — 3074F SYST BP LT 130 MM HG: CPT | Performed by: NURSE PRACTITIONER

## 2024-11-12 PROCEDURE — 99212 OFFICE O/P EST SF 10 MIN: CPT | Performed by: NURSE PRACTITIONER

## 2024-11-12 ASSESSMENT — FIBROSIS 4 INDEX: FIB4 SCORE: 1.53

## 2024-11-12 NOTE — LETTER
November 12, 2024    To Whom It May Concern:         This is confirmation that Aminata Lorenzana attended her scheduled appointment with ALIYA Galan on 11/12/24. Patient has an emotional support animal that she uses to assist with her mental health. Please allow patient to maintain her emotional support animal.          If you have any questions please do not hesitate to call me at the phone number listed below.    Sincerely,          PATRICIO GalanP.RJbN.  [phone]

## 2024-11-12 NOTE — PROGRESS NOTES
Verbal consent was acquired by the patient to use EDAN ambient listening note generation during this visit     Chief Complaint   Patient presents with    Requesting Labs    Referral Needed       Subjective:     HPI:   History of Present Illness  The patient presents for evaluation of multiple medical concerns.    She reports a recent kidney infection and urinary tract infection (UTI), which were treated with Macrobid. Despite the treatment, she experienced fevers, chills, intense kidney and back pain, and nausea for several days. She also noticed an unusual odor in her urine, similar to that of a UTI. However, after four days of medication, her doctor informed her that there was no new growth in the culture, suggesting she might not have a UTI. She is unsure if she has a kidney stone, as she has never had one before. Her back pain has since subsided.    She is scheduled for a hysterectomy, which involves the removal of her uterus and fallopian tubes, but not her ovaries. She is seeking a referral to a pulmonologist to ensure her lungs are healthy enough for the procedure. She also mentions that she recently had blood work done at the ER due to her kidney infection.    She is requesting a letter or certification for her cats to be recognized as emotional support animals. She uses them to help manage her anxiety and post-traumatic stress disorder (PTSD).    She has been experiencing a lot of anxiety lately and has had anxiety her whole life. She has sessions where it is a little more intense sometimes. She has a history of asthma. Earlier this year, she was in the ER for pneumonia and RSV. The last time she had RSV was when she was 5.        No problems updated.    ROS  See HPI     Allergies   Allergen Reactions    [place] (Diagnostic) Hives, Shortness of Breath, Rash, Itching, Swelling and Anxiety     Other reaction(s): Unspecified    [place] Meal Unspecified          [place] Hives, Shortness of Breath, Rash,  "Itching, Swelling and Anxiety             Current medicines (including changes today)  Current Outpatient Medications   Medication Sig Dispense Refill    SUMAtriptan (IMITREX) 50 MG Tab TAKE 1 TABLET BY MOUTH ONE TIME AS NEEDED FOR MIGRAINE. MAY REPEAT ONCE AFTER 2 HOURS 9 Tablet 0    levonorgestrel-ethinyl estradiol (AVIANE) 0.1-20 MG-MCG per tablet Take 1 Tablet by mouth every day. To be taken continuously skipping placebo pills. 400 Tablet 5    albuterol 108 (90 Base) MCG/ACT Aero Soln inhalation aerosol Inhale 2 Puffs every 6 hours as needed for Shortness of Breath. 8.5 g 1     No current facility-administered medications for this visit.       Social History     Tobacco Use    Smoking status: Never    Smokeless tobacco: Never   Vaping Use    Vaping status: Never Used   Substance Use Topics    Alcohol use: Yes     Comment: \"once a week\"    Drug use: Yes     Types: Marijuana, Inhaled       Patient Active Problem List    Diagnosis Date Noted    Diarrhea of presumed infectious origin 03/29/2024    Vaginal itching 02/05/2024    RSV (acute bronchiolitis due to respiratory syncytial virus) 01/17/2024    Acute respiratory failure (HCC) 01/17/2024    Acute asthma exacerbation 01/17/2024    Pneumonia due to organism 01/16/2024    Upper respiratory tract infection 12/31/2023    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    Migraine with aura and without status migrainosus, not intractable 05/16/2023    Menorrhagia with regular cycle 05/16/2023    Painful menstrual periods 05/16/2023    Neck pain 04/26/2023    UTI symptoms 04/26/2023    Encounter to establish care 04/26/2023       Family History   Problem Relation Age of Onset    Stroke Father     Diabetes Maternal Grandmother     Diabetes Paternal Grandfather           Objective:     /72 (BP Location: Left arm, Patient Position: Sitting, BP Cuff Size: Adult)   Pulse 85   Temp 37 °C (98.6 °F) (Temporal)   Resp 16   Wt 53.1 kg (117 lb)   SpO2 96%  Body mass index is 19.47 " kg/m².    Physical Exam:  Physical Exam  Vitals reviewed.   Constitutional:       General: She is awake.      Appearance: Normal appearance. She is well-developed.   HENT:      Head: Normocephalic.   Eyes:      Conjunctiva/sclera: Conjunctivae normal.   Cardiovascular:      Rate and Rhythm: Normal rate.   Pulmonary:      Effort: Pulmonary effort is normal. No respiratory distress.   Musculoskeletal:      Cervical back: Neck supple.   Skin:     General: Skin is warm and dry.   Neurological:      Mental Status: She is alert and oriented to person, place, and time.   Psychiatric:         Mood and Affect: Mood normal.         Behavior: Behavior normal. Behavior is cooperative.              Assessment and Plan:     The following treatment plan was discussed:    Problem List Items Addressed This Visit    None  Visit Diagnoses       Pre-op evaluation        Relevant Orders    Referral to Pulmonary and Sleep Medicine            Assessment & Plan  1. Kidney infection.  Her swab test returned negative results, and the urinalysis showed no signs of infection. She was previously on Macrobid, but the culture showed no new growth, indicating she might not have a UTI. She will monitor her symptoms and return if they worsen.    2. Preoperative evaluation.  Her blood work from the ER, which included tests for kidney function, liver function, blood counts, and electrolytes, showed normal results. A referral to a pulmonologist was provided to ensure her lungs are in good condition for the upcoming surgery. She was advised to inform if her OB/GYN requires additional labs.    3. Anxiety.  She has been experiencing increased anxiety lately. She was advised to continue managing her anxiety and seek further support if needed.    4. Emotional support animal certification.  A letter was provided stating that she uses her cats for mental health support and that they should be considered as emotional support animals.        Any change or  worsening of signs or symptoms, patient encouraged to follow-up or report to emergency room for further evaluation. Patient verbalizes understanding and agrees.      PLEASE NOTE: This dictation was created using voice recognition software. I have made every reasonable attempt to correct obvious errors, but I expect that there are errors of grammar and possibly content that I did not discover before finalizing the note.

## 2024-11-22 ENCOUNTER — TELEPHONE (OUTPATIENT)
Dept: MEDICAL GROUP | Facility: MEDICAL CENTER | Age: 34
End: 2024-11-22
Payer: MEDICAID

## 2024-12-05 ENCOUNTER — OFFICE VISIT (OUTPATIENT)
Dept: SLEEP MEDICINE | Facility: MEDICAL CENTER | Age: 34
End: 2024-12-05
Attending: NURSE PRACTITIONER
Payer: MEDICAID

## 2024-12-05 VITALS
HEIGHT: 65 IN | OXYGEN SATURATION: 98 % | SYSTOLIC BLOOD PRESSURE: 98 MMHG | DIASTOLIC BLOOD PRESSURE: 64 MMHG | BODY MASS INDEX: 18.99 KG/M2 | HEART RATE: 91 BPM | WEIGHT: 114 LBS

## 2024-12-05 DIAGNOSIS — Z01.818 PRE-OP EVALUATION: ICD-10-CM

## 2024-12-05 PROCEDURE — 3078F DIAST BP <80 MM HG: CPT | Performed by: INTERNAL MEDICINE

## 2024-12-05 PROCEDURE — 3074F SYST BP LT 130 MM HG: CPT | Performed by: INTERNAL MEDICINE

## 2024-12-05 PROCEDURE — 99211 OFF/OP EST MAY X REQ PHY/QHP: CPT | Performed by: INTERNAL MEDICINE

## 2024-12-05 PROCEDURE — 99204 OFFICE O/P NEW MOD 45 MIN: CPT | Performed by: INTERNAL MEDICINE

## 2024-12-05 ASSESSMENT — ENCOUNTER SYMPTOMS
WHEEZING: 0
CHEST TIGHTNESS: 0
SHORTNESS OF BREATH: 0
DYSPNEA AT REST: 0
RESPIRATORY SYMPTOMS COMMENTS: NO
HEMOPTYSIS: 0

## 2024-12-05 ASSESSMENT — FIBROSIS 4 INDEX: FIB4 SCORE: 1.53

## 2024-12-05 NOTE — PROGRESS NOTES
"Pulmonary Consult    Date of Initial Consult: 12/5/24    Reason for consult: pre-op evaluation.     Chief Complaint:  Chief Complaint   Patient presents with    Other     Pre-op evaluation, Pneumonia      HPI:   Aminata Lorenzana is a very pleasant 34 y.o. female     TODAY:  She is here for preop evaluation for a partial hysterectomy.  She is in good physical fitness and hikes 10+ miles and works out 4 times a week.  She has not used an inhaler since February.  She denies any review of systems complaints related to her respiratory system.    Pulmonary History:  From Nov 2024 with Mercedes Hall, APRN:  \"She has a history of asthma. Earlier this year, she was in the ER for pneumonia and RSV. The last time she had RSV was when she was 5. \"    Patient has not been seen by pulmonary at [facility] in the past.  she has no prior PFTs    Pulmonary History Questionnaire (Submitted on 12/5/2024)  What is the reason for your visit today?: Partial Hysterectomy surgery coming up. We can’t schedule the surgery until I see the pulmnologist.  Do you cough first thing in the morning or at other times during the day?: Only when sick  Do you have a cough on most days? If so, how long have you had this cough?: No  Bring up phlegm (mucus, sputum) in the morning or other times during the day?: Only when sick  Do you cough up blood from your chest?: No  Do you experience wheezing?: No  How often?: rarely  Do you experience any chest tightness?: No  Experience shortness of breath?: No  Experience shortness of breath at rest?: No  How far can you walk before becoming short of breath or need to rest? : Only on steep incline  Have you ever been hospitalized?: Yes  Reason, year, and hospital in which you were hospitalized:: February 2024 for RSV and Pneumonia. When i was 5 and 6 for RSV  Have you ever needed to be intubated or placed on a ventilator? : No  Have you ever had problems with anesthesia?: No  Have you experienced post-operative " "delirium?: No  Any complications with surgery?: No  Have you had a TB skin test? If so, please list the year and result:: Many years ago  Have you had Allergy skin testing? If so, please list the year and result:: No  Please list all your occupations from your first job to your current job. 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 [place]. [place]. [place]. [place]. [place].  Birds?: Yes  Dogs?: Yes  Cats?: Yes  Mice and/or Deer Mice?: Yes  Reptiles?: Yes  Blood Count: 2024  Sleep Study: 2017  Coffee: 0  Energy drinks: 0  Tea: 1  Carbonated soft drinks: 0      Past Medical History:   Diagnosis Date    Migraine with aura     presented with \"stroke like symptoms\"    Stroke (HCC) 2017       Past Surgical History:   Procedure Laterality Date    NO PERTINENT PAST SURGICAL HISTORY         Social History     Socioeconomic History    Marital status: Single     Spouse name: Not on file    Number of children: Not on file    Years of education: Not on file    Highest education level: Not on file   Occupational History    Not on file   Tobacco Use    Smoking status: Former     Types: Cigarettes    Smokeless tobacco: Never   Vaping Use    Vaping status: Never Used   Substance and Sexual Activity    Alcohol use: Yes     Alcohol/week: 1.2 oz     Types: 2 Glasses of wine per week    Drug use: Yes     Types: Marijuana, Inhaled    Sexual activity: Yes     Partners: Male     Birth control/protection: Pill   Other Topics Concern    Not on file   Social History Narrative    Not on file     Social Drivers of Health " "    Financial Resource Strain: Not on File (11/3/2020)    [facility] from [facility]    Financial Resource Strain     Financial Resource Strain: 0   Food Insecurity: Not on File (2024)    [facility] from [email or web address]    Food Insecurity     Food: 0   Transportation Needs: Not on File (11/3/2020)    [facility] from [facility]    Transportation Needs     Transportation: 0   Physical Activity: Not on File (11/3/2020)    Received from [facility]    Physical Activity     Physical Activity: 0   Stress: Not on File (11/3/2020)    Received from [facility]    Stress     Stress: 0   Social Connections: Not on File (2024)    Received from [facility]    Social Connections     Connectedness: 0   Intimate Partner Violence: Not on file   Housing Stability: Not on File (11/3/2020)    Received from [facility]    Housing Stability     Housin          Family History   Problem Relation Age of Onset    Stroke Father     Diabetes Maternal Grandmother     Diabetes Paternal Grandfather        Current Outpatient Medications on File Prior to Visit   Medication Sig Dispense Refill    SUMAtriptan (IMITREX) 50 MG Tab TAKE 1 TABLET BY MOUTH ONE TIME AS NEEDED FOR MIGRAINE. MAY REPEAT ONCE AFTER 2 HOURS 9 Tablet 0    levonorgestrel-ethinyl estradiol (AVIANE) 0.1-20 MG-MCG per tablet Take 1 Tablet by mouth every day. To be taken continuously skipping placebo pills. 400 Tablet 5    albuterol 108 (90 Base) MCG/ACT Aero Soln inhalation aerosol Inhale 2 Puffs every 6 hours as needed for Shortness of Breath. 8.5 g 1     No current facility-administered medications on file prior to visit.       Allergies: [place] (diagnostic), [place] meal, and Cinnamon      ROS: A 12 point ROS was performed on intake and during my interview. ROS negative unless specifically noted in HPI.     Vitals:  BP 98/64   Pulse 91   Ht 1.651 m (5' 5\")   Wt 51.7 kg (114 lb)   SpO2 98%     Wt Readings from Last 3 Encounters:   24 51.7 kg (114 lb)   24 53.1 kg " (117 lb)   11/10/24 53.2 kg (117 lb 4.6 oz)       Physical Exam:  Physical Exam  Vitals reviewed.   Constitutional:       General: She is not in acute distress.     Appearance: She is normal weight. She is not ill-appearing, toxic-appearing or diaphoretic.   Eyes:      General:         Right eye: No discharge.         Left eye: No discharge.      Conjunctiva/sclera: Conjunctivae normal.      Pupils: Pupils are equal, round, and reactive to light.   Cardiovascular:      Rate and Rhythm: Normal rate.      Pulses: Normal pulses.   Pulmonary:      Effort: Pulmonary effort is normal.      Breath sounds: Normal breath sounds.   Musculoskeletal:      Right lower leg: No edema.      Left lower leg: No edema.   Skin:     General: Skin is warm.      Capillary Refill: Capillary refill takes less than 2 seconds.      Coloration: Skin is not jaundiced.   Neurological:      General: No focal deficit present.      Mental Status: She is alert.   Psychiatric:         Behavior: Behavior normal.         Laboratory Data: I personally reviewed labs including historical lab trends.     Immunization History   Administered Date(s) Administered    DTP - Historical vaccine 1988, 1988, 1988, 07/13/1989, 06/16/1993    Hib Vaccine (Prp-d) - HISTORICAL DATA 1988    Influenza Vaccine Pediatric Split - Historical Data 10/27/2003    MMR Vaccine 05/04/1989, 06/16/1993    OPV TRIVALENT - HISTORICAL DATA (GIVEN PRIOR TO MAY 2016) 1988, 1988, 1988, 07/13/1989, 06/16/1993    TD Vaccine 09/21/1998           PFTs as reviewed by me personally show: n/a    Imaging as reviewed by me personally show: n/a     Assessment/Plan:    Pulmonary problem list:  #Asthma - mild intermittent, controlled    There are no unique risk factors to Rashmi at this time that can be modified to optimize her preop risk.  She is in good physical condition and her asthma is controlled.  She can proceed with surgery.  Usual postoperative care  should be employed including early ambulation and incentive spirometry.    RTC PRN    Total consult time was 45 minutes. This includes reviewing previous provider notes, personally reviewing previous imaging and spirometry, educating the patient about their disease process, and inhaler education.   __________  Spencer Xiao, DO  Pulmonary and Critical Care Medicine  [facility]    Please note that this dictation was created using voice recognition software. The accuracy of the dictation is limited to the abilities of the software. I have made every reasonable attempt to correct obvious errors, but I expect that there are errors of grammar and possibly content that I did not discover before finalizing the note.

## 2024-12-27 ENCOUNTER — APPOINTMENT (OUTPATIENT)
Dept: ADMISSIONS | Facility: MEDICAL CENTER | Age: 34
End: 2024-12-27
Attending: OBSTETRICS & GYNECOLOGY
Payer: MEDICAID

## 2024-12-30 ENCOUNTER — GYNECOLOGY VISIT (OUTPATIENT)
Dept: OBGYN | Facility: CLINIC | Age: 34
End: 2024-12-30
Payer: MEDICAID

## 2024-12-30 VITALS — SYSTOLIC BLOOD PRESSURE: 108 MMHG | BODY MASS INDEX: 19.3 KG/M2 | DIASTOLIC BLOOD PRESSURE: 64 MMHG | WEIGHT: 116 LBS

## 2024-12-30 DIAGNOSIS — Q51.9 UTERINE ANOMALY: ICD-10-CM

## 2024-12-30 DIAGNOSIS — N94.6 DYSMENORRHEA: ICD-10-CM

## 2024-12-30 DIAGNOSIS — N92.0 MENORRHAGIA WITH REGULAR CYCLE: ICD-10-CM

## 2024-12-30 DIAGNOSIS — Z01.818 PREOPERATIVE EXAM FOR GYNECOLOGIC SURGERY: ICD-10-CM

## 2024-12-30 RX ORDER — ACETAMINOPHEN AND CODEINE PHOSPHATE 120; 12 MG/5ML; MG/5ML
1 SOLUTION ORAL DAILY
Qty: 28 TABLET | Refills: 2 | Status: SHIPPED | OUTPATIENT
Start: 2024-12-30

## 2024-12-30 ASSESSMENT — FIBROSIS 4 INDEX: FIB4 SCORE: 1.53

## 2024-12-30 NOTE — PROGRESS NOTES
"History and Physical    Aminata Lorenzana    34 y.o.    Chief complaint    No chief complaint on file.      HPI    Patient is a pleasant 35 yo G0 who presents for surgical preoperative visit for hysterectomy in the setting of heavy, painful menses, uterine anomaly and contraindications to many medical therapies due to history of migraines with aura. She does not desire future fertility.     She has a long history of very heavy and painful menstrual cycles since adolescence and describes 1 week of pain prior to menses, intense pain and \"waterfall\" bleeding during menses and then persistent pain afterwards for at least a week. States she only had 1 week of feeling 'normal' and pain free every month. She was placed on cOCPs in the past however developed migraines with aura and then had a stroke in 2017 where she was unable to properly speak. She had extensive workup in [place] and it seems complex migraine was the final diagnosis. She does not see neurology and \"doesn't want to take medications\" but states the migraines have not really been better or worse during the time she's been on contraceptives. Was never prescribed anticoagulation. Her last migraine with aura was about 1 week ago, but quickly starts taking her migraine medication which resolves it within hours. She was kept on low dose cOCPs by her PCP despite her history of migraines with aura, stating that she was 'doing fine' after her stroke and did not want to get pregnant. She understands that estrogen containing OCPs is contraindicated with a history of migraines with aura. She has tolerated them well otherwise and her symptoms of heavy and painful menses are well controlled since that time. She wants to come off of the cOCPs and strongly does not desire fertility and thus wants to go through with a hysterectomy.      She had a full workup last year to determine the nature of a uterine anomaly and she was finally confirmed to have a septate uterus.   " "  She was seen by my colleague in our office over a year ago for the workup of the above issues and strongly desired hysterectomy as definitive management of her history of heavy and painful menses and not wanting to be on estrogen containing birth control. IUDs would be contraindicated due to the uterine anomaly and she declines other progesterone only contraceptives such as nexplanon, depo provera or progesterone pill. She also declined endometrial ablation.     She developed pneumonia from RSV and was hospitalized from January through February and thus did not pursue hysterectomy by my colleague due to this and recovering from pneumonia. She states she has fully recovered from her pneumonia. Has been cleared for surgery by her PCP and pulmonology.     From her exam, total laparoscopic approach would be safest. Recommended removal of both fallopian tubes to prevent future tubal disease. Recommended ovarian conservation if ovaries are normal, to which she agrees.      Hysterectomy procedure, risks, and recovery briefly discussed.     Risks of surgery discussed with patient, including, but not limited to, bleeding, infection, damage to other organs such as bowel, bladder, ureters, and nerves, need for reoperation, need for blood transfusion if indicated. Patient understands all risks and all questions answered. Consents to be signed.         Review of Systems:  Review of Systems   All other systems reviewed and are negative.       Past Obstetrical History:    G0    Past Gynecological History:    Last pap: 2023 NILM  H/o abnormal pap: No  H/o STIs: No  LMP: Patient's last menstrual period was 12/12/2024.  BCM: OCPs    Past Medical History    Past Medical History:   Diagnosis Date    Migraine with aura     presented with \"stroke like symptoms\"    Stroke (HCC) 2017       Past Surgical History    Past Surgical History:   Procedure Laterality Date    NO PERTINENT PAST SURGICAL HISTORY         Family History   Problem " "Relation Age of Onset    Stroke Father     Diabetes Maternal Grandmother     Diabetes Paternal Grandfather        Allergies    Allergies   Allergen Reactions    [place] (Diagnostic) Hives, Shortness of Breath, Rash, Itching, Swelling and Anxiety     Other reaction(s): Unspecified    [place] Meal Unspecified          Cinnamon Hives, Shortness of Breath, Rash, Itching, Swelling and Anxiety             Medications    Current Outpatient Medications   Medication Sig Dispense Refill    SUMAtriptan (IMITREX) 50 MG Tab TAKE 1 TABLET BY MOUTH ONE TIME AS NEEDED FOR MIGRAINE. MAY REPEAT ONCE AFTER 2 HOURS 9 Tablet 0    levonorgestrel-ethinyl estradiol (AVIANE) 0.1-20 MG-MCG per tablet Take 1 Tablet by mouth every day. To be taken continuously skipping placebo pills. 400 Tablet 5    albuterol 108 (90 Base) MCG/ACT Aero Soln inhalation aerosol Inhale 2 Puffs every 6 hours as needed for Shortness of Breath. 8.5 g 1     No current facility-administered medications for this visit.       Social  Social History     Tobacco Use    Smoking status: Former     Types: Cigarettes    Smokeless tobacco: Never   Vaping Use    Vaping status: Never Used   Substance Use Topics    Alcohol use: Yes     Alcohol/week: 1.2 oz     Types: 2 Glasses of wine per week    Drug use: Yes     Types: Marijuana, Inhaled        OBJECTIVE:    Vitals    /64 (BP Location: Left arm, Patient Position: Sitting, BP Cuff Size: Adult)   Wt 116 lb   LMP 12/12/2024   BMI 19.30 kg/m²     Physical Exam    GENERAL: Well developed, well nourished, female in no acute distress.    HEENT: NCAT, mucus membranes moist    Neck: Supple, nontender, no LIT, no thyromegaly    CV: RRR    Pulm: CTAB    Abdomen: Soft ND NT.    Ext: SCHAFER    : Deferred    Exam from note dated 10/29/24  \"Pelvic Exam:               Vulva: normal.               Urethra: normal.              Vagina: normal. Narrow small introitus.               Cervix: normal, pink smooth no lesions, discharge or blood. " "Little to no descent of cervix; high in pelvis.               Uterus: Small midline normal size, shape and contour.               left adnexa: normal. No masses or tenderness              right adnexa: normal. No masses or tenderness              Perineum: normal.\"    Labs/Pathology:    23 pap NILM, HPV neg    Imagin2023 1:05 PM     HISTORY/REASON FOR EXAM:  Evaluate uterine cavity for type of and degree of uterine anomaly (bicornuate vs septum). Patient has a known history of uterine anomaly.        TECHNIQUE/EXAM DESCRIPTION:  Transabdominal and transvaginal pelvic ultrasound.     COMPARISON:   Pelvic ultrasound 2023     FINDINGS:  Both transabdominal and transvaginal scanning were performed to optimally visualize the pelvis.     This was originally scheduled as a hysterosonogram. The procedure and potential limitations were discussed in detail with the patient prior to the hysterosonogram portion of the scheduled examination. Following this discussion, it is decided to not   complete the hysterosonogram portion of the examination as it is felt that pelvic MRI would better answer the clinical question in regards to delineating septate versus bicornuate uterus. The patient will discuss further with her ordering physician   tomorrow.     UTERUS:  The uterus measures 7.8 x 3.3 x 6.4 cm.  The uterine myometrium is homogeneous with no discrete masses.     There is a single cervix with small developing cysts noted.     The endometrium separates into 2 separate right and left-sided endometrial cavities in the uterine body and fundus each measuring 3 mm in thickness with no abnormal vascularity, fluid or mass. The uterine surface appears fairly flat posteriorly on the   axial images with no significant indentation. Unfortunately we do not have the ability for 3-D imaging to evaluate the superior uterine fundus contour. These findings are clearly consistent with uterine anomaly of either bicornuate or " septate uterus.     OVARIES:  The right ovary measures 3.0 x 1.4 x 2.0 cm. Duplex Doppler examination of the right ovary shows normal waveforms. The right ovary is normal in size and appearance.     The left ovary measures 4.9 x 4.9 x 4.3 cm. Duplex Doppler examination of the left ovary shows normal waveforms. There is a simple 4.5 x 4.0 x 4.2 cm left ovarian cyst.        There is no free fluid seen.     IMPRESSION:     1.  There is a definite uterine anomaly, either bicornuate or septate uterus. Based on the transaxial imaging only I would favor septate or bicornuate uterus but this could be confirmed with pelvic MRI.  2.  Simple left ovarian cyst.  3.  Please note above discussion with the patient. Patient will discuss potential further MRI imaging with her physician tomorrow. Please note a request has been sent through [facility] in regards to   making today's pelvic ultrasound a no charge exam in light of the patient's prior history.    12/14/2023 1:37 PM     HISTORY/REASON FOR EXAM:  septate vs bicornuate uterus, please determine     TECHNIQUE/EXAM DESCRIPTION:  MRI of the pelvis without and with contrast.     The study was performed on a Navin 3.0 Marichuy MRI scanner.     T1 axial, T1 coronal, T2 fast spin-echo fat-suppressed axial, and T2 fast spin-echo fat-suppressed coronal images were obtained of the pelvis. Postcontrast fat-suppressed intravenous gadolinium enhanced sequences in the axial and coronal planes were then   performed.     10 mL ProHance contrast was administered intravenously.     COMPARISON: Pelvic ultrasound 11/9/2023     FINDINGS:  Bladder is unremarkable.  Configuration of the endometrial canal consistent with septate uterus.  LEFT adnexal cystic structure measuring 4.2 x 3.8 x 4.2 cm.  RIGHT ovary is not well-visualized.  No free fluid demonstrated.     IMPRESSION:     1.  Septate uterus.  2.  Simple LEFT ovarian cyst measuring 4.2  cm  3.  RIGHT ovary is not well-visualized.  A/P    Patient Active Problem List   Diagnosis    Neck pain    UTI symptoms    Encounter to establish care    Migraine with aura and without status migrainosus, not intractable    Menorrhagia with regular cycle    Painful menstrual periods    Uterine anomaly    Upper respiratory tract infection    Pneumonia due to organism    RSV (acute bronchiolitis due to respiratory syncytial virus)    Acute respiratory failure (HCC)    Acute asthma exacerbation    Vaginal itching    Diarrhea of presumed infectious origin       Aminata Lorenzana    34 y.o.  with AUB, dysmenorrhea, uterine anomaly (septate uterus) desiring definitive surgical management via hysterectomy.     1. Preoperative exam for gynecologic surgery    2. Menorrhagia with regular cycle    3. Uterine anomaly    4. Dysmenorrhea      Risks, benefits and recovery discussed in detail.   All questions/concerns addressed.   Consent to be signed.   Plan to switch from cOCP to progesterone only pill until surgery.   Caprini score - 3 moderate risk, plan for SCDs intraoperatively and postop until ambulatory.   Preop labs ordered.   Avoid NSAIDS 7 days prior to surgery.   NPO prior to surgery.   Notify anesthesia.     To OR for total laparoscopic hysterectomy, bilateral salpingectomy, cystoscopy, possible laparotomy.     Time spent: 30 minutes        Kristine Osborn M.D.    Obstetrics and Gynecology    43:42 PM

## 2024-12-30 NOTE — PROGRESS NOTES
Pt. Here for Pre Op visit today, with , Hysterectomy scheduled for 1/20/25 @ 7:30am.   Good # [phone]   Pharmacy verified.

## 2025-01-02 ENCOUNTER — PRE-ADMISSION TESTING (OUTPATIENT)
Dept: ADMISSIONS | Facility: MEDICAL CENTER | Age: 35
End: 2025-01-02
Attending: OBSTETRICS & GYNECOLOGY
Payer: MEDICAID

## 2025-01-02 RX ORDER — IBUPROFEN 200 MG
400 TABLET ORAL PRN
Status: ON HOLD | COMMUNITY
End: 2025-01-21

## 2025-01-02 NOTE — PREADMIT AVS NOTE
Current Medications   Medication Instructions    ibuprofen (MOTRIN) 200 MG Tab Stop 5 days before surgery    Probiotic Product (PRO-BIOTIC BLEND PO) Hold medication day of procedure    norethindrone (MICRONOR) 0.35 MG tablet Hold medication day of procedure    SUMAtriptan (IMITREX) 50 MG Tab Hold medication day of procedure

## 2025-01-08 ENCOUNTER — PRE-ADMISSION TESTING (OUTPATIENT)
Dept: ADMISSIONS | Facility: MEDICAL CENTER | Age: 35
End: 2025-01-08
Attending: OBSTETRICS & GYNECOLOGY
Payer: MEDICAID

## 2025-01-08 ENCOUNTER — TELEPHONE (OUTPATIENT)
Dept: OBGYN | Facility: CLINIC | Age: 35
End: 2025-01-08
Payer: MEDICAID

## 2025-01-08 DIAGNOSIS — Z01.812 PRE-OPERATIVE LABORATORY EXAMINATION: ICD-10-CM

## 2025-01-08 DIAGNOSIS — Z01.818 PREOPERATIVE EXAM FOR GYNECOLOGIC SURGERY: ICD-10-CM

## 2025-01-08 LAB
APPEARANCE UR: CLEAR
BACTERIA #/AREA URNS HPF: ABNORMAL /HPF
BILIRUB UR QL STRIP.AUTO: NEGATIVE
CASTS URNS QL MICRO: ABNORMAL /LPF (ref 0–2)
COLOR UR: YELLOW
EPITHELIAL CELLS 1715: ABNORMAL /HPF (ref 0–5)
GLUCOSE UR STRIP.AUTO-MCNC: NEGATIVE MG/DL
KETONES UR STRIP.AUTO-MCNC: ABNORMAL MG/DL
LEUKOCYTE ESTERASE UR QL STRIP.AUTO: ABNORMAL
MICRO URNS: ABNORMAL
MUCOUS THREADS URNS QL MICRO: PRESENT /HPF
NITRITE UR QL STRIP.AUTO: POSITIVE
PH UR STRIP.AUTO: 5.5 [PH] (ref 5–8)
PROT UR QL STRIP: NEGATIVE MG/DL
RBC # URNS HPF: ABNORMAL /HPF (ref 0–2)
RBC UR QL AUTO: NEGATIVE
SP GR UR STRIP.AUTO: 1.03
UROBILINOGEN UR STRIP.AUTO-MCNC: 0.2 EU/DL
WBC #/AREA URNS HPF: ABNORMAL /HPF

## 2025-01-08 PROCEDURE — 87086 URINE CULTURE/COLONY COUNT: CPT

## 2025-01-08 PROCEDURE — 87186 SC STD MICRODIL/AGAR DIL: CPT

## 2025-01-08 PROCEDURE — 87077 CULTURE AEROBIC IDENTIFY: CPT | Mod: 91

## 2025-01-08 PROCEDURE — 81001 URINALYSIS AUTO W/SCOPE: CPT

## 2025-01-08 NOTE — TELEPHONE ENCOUNTER
Ivy,RN @ [facility] states they were unable to draw patient blood due to her being a very difficult stick. Ivy wanted to know if it was ok for this to be drawn the day of surgery. Encounter routed to provider to advise.   Ext. [phone]

## 2025-01-08 NOTE — OR NURSING
Lab was unable to get  a sample today for pre op testing.  I called Dr Osborn and spoke with Radha LOPEZ.  She will ask if we can do the testing on the day of surgery.  She will call us back and let us now.

## 2025-01-09 ENCOUNTER — TELEPHONE (OUTPATIENT)
Dept: OBGYN | Facility: CLINIC | Age: 35
End: 2025-01-09
Payer: MEDICAID

## 2025-01-09 NOTE — TELEPHONE ENCOUNTER
Kristine Osborn M.D.  1/8/2025  3:45 PM PST       Please have lab run urine culture, I have ordered it. thanks     Called the lab and spoke to Skylar in micro, who stated it that they still have the urine sample, but because of the elevated epithial cells this can indicate is a contaminated sample and when they run the UA Cx it may can back as sink tina. Explained to Skylar this RN will consult with Dr. Osborn and will call her back.   Consulted with Dr. Osborn and explained as above. And per provider and asked for UA Cx ti still be run.   Called Skylar back and informed and they will run UA Cx.

## 2025-01-11 LAB
BACTERIA UR CULT: ABNORMAL
BACTERIA UR CULT: ABNORMAL
SIGNIFICANT IND 70042: ABNORMAL
SITE SITE: ABNORMAL
SOURCE SOURCE: ABNORMAL

## 2025-01-13 DIAGNOSIS — N39.0 URINARY TRACT INFECTION WITHOUT HEMATURIA, SITE UNSPECIFIED: ICD-10-CM

## 2025-01-13 RX ORDER — SULFAMETHOXAZOLE AND TRIMETHOPRIM 800; 160 MG/1; MG/1
1 TABLET ORAL 2 TIMES DAILY
Qty: 14 TABLET | Refills: 0 | Status: SHIPPED | OUTPATIENT
Start: 2025-01-13 | End: 2025-01-20

## 2025-01-13 NOTE — OR NURSING
Notified Radha LOPEZ for Dr. Osborn of pt's urine results.  Also wanted to verify that completing blood work day of procedure was okay with Dr. Osborn as she could not be drawn in preadmit.  Radha reports that she would report to Dr. Osborn.

## 2025-01-19 ENCOUNTER — ANESTHESIA EVENT (OUTPATIENT)
Dept: SURGERY | Facility: MEDICAL CENTER | Age: 35
End: 2025-01-19
Payer: MEDICAID

## 2025-01-19 NOTE — ANESTHESIA PREPROCEDURE EVALUATION
Case: [id number] Date/Time: 01/20/25 0715    Procedures:       TOTAL LAPAROSCOPIC HYSTERECTOMY, BILATERAL SALPINGECTOMY, CYSTOSCOPY, POSSIBLE LAPAROTOMY      MINILAPAROTOMY    Pre-op diagnosis: UTERINE ANOMALY, DYSMENORRHEA, MENORRHAGIA WITH REGULAR CYCLE    Location: [facility] ROOM 21 / SURGERY SAME DAY [facility]    Surgeons: Kristine Osborn M.D.            Relevant Problems   PULMONARY   (positive) Acute asthma exacerbation   (positive) Pneumonia due to organism      NEURO   (positive) Migraine with aura and without status migrainosus, not intractable      CARDIAC   (positive) Migraine with aura and without status migrainosus, not intractable       Physical Exam    Airway   Mallampati: I  TM distance: >3 FB  Neck ROM: full       Cardiovascular - normal exam     Dental - normal exam           Pulmonary - normal exam     Abdominal    Neurological              Anesthesia Plan    ASA 2       Plan - general       Airway plan will be ETT          Induction: intravenous    Postoperative Plan: Postoperative administration of opioids is intended.    Pertinent diagnostic labs and testing reviewed    Informed Consent:    Anesthetic plan and risks discussed with patient.    Use of blood products discussed with: patient whom.

## 2025-01-20 ENCOUNTER — APPOINTMENT (OUTPATIENT)
Dept: RADIOLOGY | Facility: MEDICAL CENTER | Age: 35
End: 2025-01-20
Attending: OBSTETRICS & GYNECOLOGY
Payer: MEDICAID

## 2025-01-20 ENCOUNTER — HOSPITAL ENCOUNTER (OUTPATIENT)
Facility: MEDICAL CENTER | Age: 35
End: 2025-01-21
Attending: OBSTETRICS & GYNECOLOGY | Admitting: OBSTETRICS & GYNECOLOGY
Payer: MEDICAID

## 2025-01-20 ENCOUNTER — ANESTHESIA (OUTPATIENT)
Dept: SURGERY | Facility: MEDICAL CENTER | Age: 35
End: 2025-01-20
Payer: MEDICAID

## 2025-01-20 DIAGNOSIS — G89.18 ACUTE POSTOPERATIVE PAIN: ICD-10-CM

## 2025-01-20 PROBLEM — N93.9 ABNORMAL UTERINE BLEEDING (AUB): Status: ACTIVE | Noted: 2025-01-20

## 2025-01-20 LAB
ABO GROUP BLD: NORMAL
BLD GP AB SCN SERPL QL: NORMAL
ERYTHROCYTE [DISTWIDTH] IN BLOOD BY AUTOMATED COUNT: 39.6 FL (ref 35.9–50)
HCG UR QL: NEGATIVE
HCT VFR BLD AUTO: 41.9 % (ref 37–47)
HGB BLD-MCNC: 14.5 G/DL (ref 12–16)
MCH RBC QN AUTO: 31.5 PG (ref 27–33)
MCHC RBC AUTO-ENTMCNC: 34.6 G/DL (ref 32.2–35.5)
MCV RBC AUTO: 90.9 FL (ref 81.4–97.8)
PATHOLOGY CONSULT NOTE: NORMAL
PLATELET # BLD AUTO: 195 K/UL (ref 164–446)
PMV BLD AUTO: 11.1 FL (ref 9–12.9)
RBC # BLD AUTO: 4.61 M/UL (ref 4.2–5.4)
RH BLD: NORMAL
WBC # BLD AUTO: 7 K/UL (ref 4.8–10.8)

## 2025-01-20 PROCEDURE — 88307 TISSUE EXAM BY PATHOLOGIST: CPT | Mod: 26 | Performed by: STUDENT IN AN ORGANIZED HEALTH CARE EDUCATION/TRAINING PROGRAM

## 2025-01-20 PROCEDURE — 700111 HCHG RX REV CODE 636 W/ 250 OVERRIDE (IP): Mod: JZ,UD | Performed by: STUDENT IN AN ORGANIZED HEALTH CARE EDUCATION/TRAINING PROGRAM

## 2025-01-20 PROCEDURE — 160048 HCHG OR STATISTICAL LEVEL 1-5: Performed by: OBSTETRICS & GYNECOLOGY

## 2025-01-20 PROCEDURE — 86900 BLOOD TYPING SEROLOGIC ABO: CPT

## 2025-01-20 PROCEDURE — 700102 HCHG RX REV CODE 250 W/ 637 OVERRIDE(OP): Mod: UD | Performed by: OBSTETRICS & GYNECOLOGY

## 2025-01-20 PROCEDURE — 86901 BLOOD TYPING SEROLOGIC RH(D): CPT

## 2025-01-20 PROCEDURE — 81025 URINE PREGNANCY TEST: CPT

## 2025-01-20 PROCEDURE — 110371 HCHG SHELL REV 272: Performed by: OBSTETRICS & GYNECOLOGY

## 2025-01-20 PROCEDURE — 700105 HCHG RX REV CODE 258: Mod: UD | Performed by: STUDENT IN AN ORGANIZED HEALTH CARE EDUCATION/TRAINING PROGRAM

## 2025-01-20 PROCEDURE — 700101 HCHG RX REV CODE 250: Mod: UD | Performed by: STUDENT IN AN ORGANIZED HEALTH CARE EDUCATION/TRAINING PROGRAM

## 2025-01-20 PROCEDURE — 85027 COMPLETE CBC AUTOMATED: CPT

## 2025-01-20 PROCEDURE — A9270 NON-COVERED ITEM OR SERVICE: HCPCS | Mod: UD | Performed by: STUDENT IN AN ORGANIZED HEALTH CARE EDUCATION/TRAINING PROGRAM

## 2025-01-20 PROCEDURE — 770030 HCHG ROOM/CARE - EXTENDED RECOVERY EACH 15 MIN

## 2025-01-20 PROCEDURE — 700102 HCHG RX REV CODE 250 W/ 637 OVERRIDE(OP): Mod: UD | Performed by: STUDENT IN AN ORGANIZED HEALTH CARE EDUCATION/TRAINING PROGRAM

## 2025-01-20 PROCEDURE — 700101 HCHG RX REV CODE 250: Mod: UD | Performed by: OBSTETRICS & GYNECOLOGY

## 2025-01-20 PROCEDURE — 88307 TISSUE EXAM BY PATHOLOGIST: CPT | Performed by: STUDENT IN AN ORGANIZED HEALTH CARE EDUCATION/TRAINING PROGRAM

## 2025-01-20 PROCEDURE — A9270 NON-COVERED ITEM OR SERVICE: HCPCS | Mod: UD | Performed by: OBSTETRICS & GYNECOLOGY

## 2025-01-20 PROCEDURE — 160041 HCHG SURGERY MINUTES - EA ADDL 1 MIN LEVEL 4: Performed by: OBSTETRICS & GYNECOLOGY

## 2025-01-20 PROCEDURE — 700104 HCHG RX REV CODE 254: Mod: UD | Performed by: OBSTETRICS & GYNECOLOGY

## 2025-01-20 PROCEDURE — 58552 LAPARO-VAG HYST INCL T/O: CPT | Performed by: OBSTETRICS & GYNECOLOGY

## 2025-01-20 PROCEDURE — 86850 RBC ANTIBODY SCREEN: CPT

## 2025-01-20 PROCEDURE — 160009 HCHG ANES TIME/MIN: Performed by: OBSTETRICS & GYNECOLOGY

## 2025-01-20 PROCEDURE — 160035 HCHG PACU - 1ST 60 MINS PHASE I: Performed by: OBSTETRICS & GYNECOLOGY

## 2025-01-20 PROCEDURE — 160002 HCHG RECOVERY MINUTES (STAT): Performed by: OBSTETRICS & GYNECOLOGY

## 2025-01-20 PROCEDURE — 160029 HCHG SURGERY MINUTES - 1ST 30 MINS LEVEL 4: Performed by: OBSTETRICS & GYNECOLOGY

## 2025-01-20 PROCEDURE — 36415 COLL VENOUS BLD VENIPUNCTURE: CPT

## 2025-01-20 RX ORDER — AMOXICILLIN 250 MG
1 CAPSULE ORAL
Status: DISCONTINUED | OUTPATIENT
Start: 2025-01-20 | End: 2025-01-21 | Stop reason: HOSPADM

## 2025-01-20 RX ORDER — HYDROMORPHONE HYDROCHLORIDE 1 MG/ML
0.1 INJECTION, SOLUTION INTRAMUSCULAR; INTRAVENOUS; SUBCUTANEOUS
Status: DISCONTINUED | OUTPATIENT
Start: 2025-01-20 | End: 2025-01-20 | Stop reason: HOSPADM

## 2025-01-20 RX ORDER — OXYCODONE HYDROCHLORIDE 5 MG/1
5 TABLET ORAL
Status: DISCONTINUED | OUTPATIENT
Start: 2025-01-20 | End: 2025-01-21 | Stop reason: HOSPADM

## 2025-01-20 RX ORDER — DIPHENHYDRAMINE HYDROCHLORIDE 50 MG/ML
25 INJECTION, SOLUTION INTRAMUSCULAR; INTRAVENOUS EVERY 6 HOURS PRN
Status: DISCONTINUED | OUTPATIENT
Start: 2025-01-20 | End: 2025-01-21 | Stop reason: HOSPADM

## 2025-01-20 RX ORDER — ACETAMINOPHEN 500 MG
1000 TABLET ORAL EVERY 6 HOURS
Status: DISCONTINUED | OUTPATIENT
Start: 2025-01-20 | End: 2025-01-21 | Stop reason: HOSPADM

## 2025-01-20 RX ORDER — ONDANSETRON 2 MG/ML
4 INJECTION INTRAMUSCULAR; INTRAVENOUS
Status: DISCONTINUED | OUTPATIENT
Start: 2025-01-20 | End: 2025-01-20 | Stop reason: HOSPADM

## 2025-01-20 RX ORDER — OXYCODONE HCL 5 MG/5 ML
5 SOLUTION, ORAL ORAL
Status: DISCONTINUED | OUTPATIENT
Start: 2025-01-20 | End: 2025-01-20 | Stop reason: HOSPADM

## 2025-01-20 RX ORDER — MORPHINE SULFATE 4 MG/ML
4 INJECTION INTRAVENOUS
Status: DISCONTINUED | OUTPATIENT
Start: 2025-01-20 | End: 2025-01-21 | Stop reason: HOSPADM

## 2025-01-20 RX ORDER — AMOXICILLIN 250 MG
1 CAPSULE ORAL NIGHTLY
Status: DISCONTINUED | OUTPATIENT
Start: 2025-01-20 | End: 2025-01-21 | Stop reason: HOSPADM

## 2025-01-20 RX ORDER — MIDAZOLAM HYDROCHLORIDE 1 MG/ML
1 INJECTION INTRAMUSCULAR; INTRAVENOUS
Status: DISCONTINUED | OUTPATIENT
Start: 2025-01-20 | End: 2025-01-20 | Stop reason: HOSPADM

## 2025-01-20 RX ORDER — HYDRALAZINE HYDROCHLORIDE 20 MG/ML
5 INJECTION INTRAMUSCULAR; INTRAVENOUS
Status: DISCONTINUED | OUTPATIENT
Start: 2025-01-20 | End: 2025-01-20 | Stop reason: HOSPADM

## 2025-01-20 RX ORDER — OXYCODONE HCL 5 MG/5 ML
10 SOLUTION, ORAL ORAL
Status: DISCONTINUED | OUTPATIENT
Start: 2025-01-20 | End: 2025-01-20 | Stop reason: HOSPADM

## 2025-01-20 RX ORDER — CEFAZOLIN SODIUM 1 G/3ML
INJECTION, POWDER, FOR SOLUTION INTRAMUSCULAR; INTRAVENOUS PRN
Status: DISCONTINUED | OUTPATIENT
Start: 2025-01-20 | End: 2025-01-20 | Stop reason: SURG

## 2025-01-20 RX ORDER — LIDOCAINE HYDROCHLORIDE 10 MG/ML
INJECTION, SOLUTION EPIDURAL; INFILTRATION; INTRACAUDAL; PERINEURAL PRN
Status: DISCONTINUED | OUTPATIENT
Start: 2025-01-20 | End: 2025-01-20 | Stop reason: SURG

## 2025-01-20 RX ORDER — EPHEDRINE SULFATE 50 MG/ML
5 INJECTION, SOLUTION INTRAVENOUS
Status: DISCONTINUED | OUTPATIENT
Start: 2025-01-20 | End: 2025-01-20 | Stop reason: HOSPADM

## 2025-01-20 RX ORDER — HALOPERIDOL 5 MG/ML
1 INJECTION INTRAMUSCULAR EVERY 6 HOURS PRN
Status: DISCONTINUED | OUTPATIENT
Start: 2025-01-20 | End: 2025-01-21 | Stop reason: HOSPADM

## 2025-01-20 RX ORDER — DEXAMETHASONE SODIUM PHOSPHATE 4 MG/ML
INJECTION, SOLUTION INTRA-ARTICULAR; INTRALESIONAL; INTRAMUSCULAR; INTRAVENOUS; SOFT TISSUE PRN
Status: DISCONTINUED | OUTPATIENT
Start: 2025-01-20 | End: 2025-01-20 | Stop reason: SURG

## 2025-01-20 RX ORDER — BISACODYL 10 MG
10 SUPPOSITORY, RECTAL RECTAL
Status: DISCONTINUED | OUTPATIENT
Start: 2025-01-20 | End: 2025-01-21 | Stop reason: HOSPADM

## 2025-01-20 RX ORDER — ALBUTEROL SULFATE 90 UG/1
2 INHALANT RESPIRATORY (INHALATION) EVERY 6 HOURS PRN
Status: DISCONTINUED | OUTPATIENT
Start: 2025-01-20 | End: 2025-01-21 | Stop reason: HOSPADM

## 2025-01-20 RX ORDER — OXYCODONE HYDROCHLORIDE 10 MG/1
10 TABLET ORAL
Status: DISCONTINUED | OUTPATIENT
Start: 2025-01-20 | End: 2025-01-21 | Stop reason: HOSPADM

## 2025-01-20 RX ORDER — IBUPROFEN 800 MG/1
800 TABLET, FILM COATED ORAL 3 TIMES DAILY
Status: DISCONTINUED | OUTPATIENT
Start: 2025-01-20 | End: 2025-01-21 | Stop reason: HOSPADM

## 2025-01-20 RX ORDER — POLYETHYLENE GLYCOL 3350 17 G/17G
1 POWDER, FOR SOLUTION ORAL 2 TIMES DAILY PRN
Status: DISCONTINUED | OUTPATIENT
Start: 2025-01-20 | End: 2025-01-21 | Stop reason: HOSPADM

## 2025-01-20 RX ORDER — ACETAMINOPHEN 500 MG
1000 TABLET ORAL ONCE
Status: COMPLETED | OUTPATIENT
Start: 2025-01-20 | End: 2025-01-20

## 2025-01-20 RX ORDER — KETOROLAC TROMETHAMINE 15 MG/ML
INJECTION, SOLUTION INTRAMUSCULAR; INTRAVENOUS PRN
Status: DISCONTINUED | OUTPATIENT
Start: 2025-01-20 | End: 2025-01-20 | Stop reason: SURG

## 2025-01-20 RX ORDER — BUPIVACAINE HYDROCHLORIDE AND EPINEPHRINE 2.5; 5 MG/ML; UG/ML
INJECTION, SOLUTION EPIDURAL; INFILTRATION; INTRACAUDAL; PERINEURAL
Status: DISCONTINUED | OUTPATIENT
Start: 2025-01-20 | End: 2025-01-20 | Stop reason: HOSPADM

## 2025-01-20 RX ORDER — SCOPOLAMINE 1 MG/3D
1 PATCH, EXTENDED RELEASE TRANSDERMAL
Status: DISCONTINUED | OUTPATIENT
Start: 2025-01-20 | End: 2025-01-21 | Stop reason: HOSPADM

## 2025-01-20 RX ORDER — MIDAZOLAM HYDROCHLORIDE 1 MG/ML
INJECTION INTRAMUSCULAR; INTRAVENOUS PRN
Status: DISCONTINUED | OUTPATIENT
Start: 2025-01-20 | End: 2025-01-20 | Stop reason: SURG

## 2025-01-20 RX ORDER — HYDROMORPHONE HYDROCHLORIDE 1 MG/ML
0.2 INJECTION, SOLUTION INTRAMUSCULAR; INTRAVENOUS; SUBCUTANEOUS
Status: DISCONTINUED | OUTPATIENT
Start: 2025-01-20 | End: 2025-01-20 | Stop reason: HOSPADM

## 2025-01-20 RX ORDER — HYDROMORPHONE HYDROCHLORIDE 1 MG/ML
0.4 INJECTION, SOLUTION INTRAMUSCULAR; INTRAVENOUS; SUBCUTANEOUS
Status: DISCONTINUED | OUTPATIENT
Start: 2025-01-20 | End: 2025-01-20 | Stop reason: HOSPADM

## 2025-01-20 RX ORDER — ONDANSETRON 2 MG/ML
INJECTION INTRAMUSCULAR; INTRAVENOUS PRN
Status: DISCONTINUED | OUTPATIENT
Start: 2025-01-20 | End: 2025-01-20 | Stop reason: SURG

## 2025-01-20 RX ORDER — DOCUSATE SODIUM 100 MG/1
100 CAPSULE, LIQUID FILLED ORAL 2 TIMES DAILY
Status: DISCONTINUED | OUTPATIENT
Start: 2025-01-20 | End: 2025-01-21 | Stop reason: HOSPADM

## 2025-01-20 RX ORDER — ONDANSETRON 2 MG/ML
4 INJECTION INTRAMUSCULAR; INTRAVENOUS EVERY 4 HOURS PRN
Status: DISCONTINUED | OUTPATIENT
Start: 2025-01-20 | End: 2025-01-21 | Stop reason: HOSPADM

## 2025-01-20 RX ORDER — ROCURONIUM BROMIDE 10 MG/ML
INJECTION, SOLUTION INTRAVENOUS PRN
Status: DISCONTINUED | OUTPATIENT
Start: 2025-01-20 | End: 2025-01-20 | Stop reason: SURG

## 2025-01-20 RX ORDER — SODIUM PHOSPHATE,MONO-DIBASIC 19G-7G/118
1 ENEMA (ML) RECTAL
Status: DISCONTINUED | OUTPATIENT
Start: 2025-01-20 | End: 2025-01-21 | Stop reason: HOSPADM

## 2025-01-20 RX ORDER — HYDROMORPHONE HYDROCHLORIDE 2 MG/ML
INJECTION, SOLUTION INTRAMUSCULAR; INTRAVENOUS; SUBCUTANEOUS PRN
Status: DISCONTINUED | OUTPATIENT
Start: 2025-01-20 | End: 2025-01-20 | Stop reason: SURG

## 2025-01-20 RX ORDER — ALBUTEROL SULFATE 5 MG/ML
2.5 SOLUTION RESPIRATORY (INHALATION)
Status: DISCONTINUED | OUTPATIENT
Start: 2025-01-20 | End: 2025-01-20 | Stop reason: HOSPADM

## 2025-01-20 RX ORDER — SODIUM CHLORIDE, SODIUM LACTATE, POTASSIUM CHLORIDE, CALCIUM CHLORIDE 600; 310; 30; 20 MG/100ML; MG/100ML; MG/100ML; MG/100ML
INJECTION, SOLUTION INTRAVENOUS
Status: DISCONTINUED | OUTPATIENT
Start: 2025-01-20 | End: 2025-01-20 | Stop reason: SURG

## 2025-01-20 RX ORDER — IBUPROFEN 800 MG/1
800 TABLET, FILM COATED ORAL 3 TIMES DAILY PRN
Status: DISCONTINUED | OUTPATIENT
Start: 2025-01-25 | End: 2025-01-21 | Stop reason: HOSPADM

## 2025-01-20 RX ORDER — HALOPERIDOL 5 MG/ML
1 INJECTION INTRAMUSCULAR
Status: DISCONTINUED | OUTPATIENT
Start: 2025-01-20 | End: 2025-01-20 | Stop reason: HOSPADM

## 2025-01-20 RX ORDER — ESMOLOL HYDROCHLORIDE 10 MG/ML
INJECTION INTRAVENOUS PRN
Status: DISCONTINUED | OUTPATIENT
Start: 2025-01-20 | End: 2025-01-20 | Stop reason: SURG

## 2025-01-20 RX ORDER — DIPHENHYDRAMINE HYDROCHLORIDE 50 MG/ML
12.5 INJECTION, SOLUTION INTRAMUSCULAR; INTRAVENOUS
Status: DISCONTINUED | OUTPATIENT
Start: 2025-01-20 | End: 2025-01-20 | Stop reason: HOSPADM

## 2025-01-20 RX ORDER — DEXAMETHASONE SODIUM PHOSPHATE 4 MG/ML
4 INJECTION, SOLUTION INTRA-ARTICULAR; INTRALESIONAL; INTRAMUSCULAR; INTRAVENOUS; SOFT TISSUE
Status: DISCONTINUED | OUTPATIENT
Start: 2025-01-20 | End: 2025-01-21 | Stop reason: HOSPADM

## 2025-01-20 RX ORDER — ACETAMINOPHEN 500 MG
1000 TABLET ORAL EVERY 6 HOURS PRN
Status: DISCONTINUED | OUTPATIENT
Start: 2025-01-25 | End: 2025-01-21 | Stop reason: HOSPADM

## 2025-01-20 RX ORDER — BUPIVACAINE HYDROCHLORIDE AND EPINEPHRINE 2.5; 5 MG/ML; UG/ML
INJECTION, SOLUTION EPIDURAL; INFILTRATION; INTRACAUDAL; PERINEURAL
Status: DISPENSED
Start: 2025-01-20 | End: 2025-01-20

## 2025-01-20 RX ORDER — MEPERIDINE HYDROCHLORIDE 25 MG/ML
6.25 INJECTION INTRAMUSCULAR; INTRAVENOUS; SUBCUTANEOUS
Status: DISCONTINUED | OUTPATIENT
Start: 2025-01-20 | End: 2025-01-20 | Stop reason: HOSPADM

## 2025-01-20 RX ADMIN — FENTANYL CITRATE 50 MCG: 50 INJECTION, SOLUTION INTRAMUSCULAR; INTRAVENOUS at 08:05

## 2025-01-20 RX ADMIN — PROPOFOL 50 MCG/KG/MIN: 10 INJECTION, EMULSION INTRAVENOUS at 07:58

## 2025-01-20 RX ADMIN — DOCUSATE SODIUM 100 MG: 100 CAPSULE, LIQUID FILLED ORAL at 18:26

## 2025-01-20 RX ADMIN — FENTANYL CITRATE 50 MCG: 50 INJECTION, SOLUTION INTRAMUSCULAR; INTRAVENOUS at 09:35

## 2025-01-20 RX ADMIN — INDIGOTINDISULFONATE SODIUM 40 MG: 8 INJECTION INTRAVENOUS at 09:27

## 2025-01-20 RX ADMIN — ESMOLOL HYDROCHLORIDE 60 MG: 100 INJECTION, SOLUTION INTRAVENOUS at 07:44

## 2025-01-20 RX ADMIN — SUGAMMADEX 200 MG: 100 INJECTION, SOLUTION INTRAVENOUS at 09:36

## 2025-01-20 RX ADMIN — IBUPROFEN 800 MG: 800 TABLET, FILM COATED ORAL at 17:17

## 2025-01-20 RX ADMIN — ACETAMINOPHEN 1000 MG: 500 TABLET ORAL at 14:02

## 2025-01-20 RX ADMIN — PROPOFOL 150 MG: 10 INJECTION, EMULSION INTRAVENOUS at 07:44

## 2025-01-20 RX ADMIN — SODIUM CHLORIDE, POTASSIUM CHLORIDE, SODIUM LACTATE AND CALCIUM CHLORIDE: 600; 310; 30; 20 INJECTION, SOLUTION INTRAVENOUS at 07:40

## 2025-01-20 RX ADMIN — LIDOCAINE HYDROCHLORIDE 50 MG: 10 INJECTION, SOLUTION EPIDURAL; INFILTRATION; INTRACAUDAL; PERINEURAL at 07:44

## 2025-01-20 RX ADMIN — ACETAMINOPHEN 1000 MG: 500 TABLET ORAL at 20:25

## 2025-01-20 RX ADMIN — CEFAZOLIN 2 G: 1 INJECTION, POWDER, FOR SOLUTION INTRAMUSCULAR; INTRAVENOUS at 07:56

## 2025-01-20 RX ADMIN — ONDANSETRON 8 MG: 2 INJECTION INTRAMUSCULAR; INTRAVENOUS at 09:23

## 2025-01-20 RX ADMIN — FENTANYL CITRATE 25 MCG: 50 INJECTION, SOLUTION INTRAMUSCULAR; INTRAVENOUS at 11:11

## 2025-01-20 RX ADMIN — Medication 100 MG: at 09:58

## 2025-01-20 RX ADMIN — ROCURONIUM BROMIDE 10 MG: 50 INJECTION, SOLUTION INTRAVENOUS at 09:01

## 2025-01-20 RX ADMIN — ACETAMINOPHEN 1000 MG: 500 TABLET ORAL at 06:38

## 2025-01-20 RX ADMIN — KETOROLAC TROMETHAMINE 15 MG: 15 INJECTION, SOLUTION INTRAMUSCULAR; INTRAVENOUS at 09:34

## 2025-01-20 RX ADMIN — MIDAZOLAM HYDROCHLORIDE 2 MG: 1 INJECTION, SOLUTION INTRAMUSCULAR; INTRAVENOUS at 07:16

## 2025-01-20 RX ADMIN — PROPOFOL 70 MG: 10 INJECTION, EMULSION INTRAVENOUS at 09:58

## 2025-01-20 RX ADMIN — ROCURONIUM BROMIDE 50 MG: 50 INJECTION, SOLUTION INTRAVENOUS at 07:44

## 2025-01-20 RX ADMIN — SCOPOLAMINE 1 PATCH: 1.5 PATCH, EXTENDED RELEASE TRANSDERMAL at 06:38

## 2025-01-20 RX ADMIN — HYDROMORPHONE HYDROCHLORIDE 0.4 MG: 2 INJECTION INTRAMUSCULAR; INTRAVENOUS; SUBCUTANEOUS at 10:13

## 2025-01-20 RX ADMIN — SENNOSIDES AND DOCUSATE SODIUM 1 TABLET: 50; 8.6 TABLET ORAL at 20:20

## 2025-01-20 RX ADMIN — DEXAMETHASONE SODIUM PHOSPHATE 8 MG: 4 INJECTION INTRA-ARTICULAR; INTRALESIONAL; INTRAMUSCULAR; INTRAVENOUS; SOFT TISSUE at 07:48

## 2025-01-20 RX ADMIN — HYDROMORPHONE HYDROCHLORIDE 0.4 MG: 2 INJECTION INTRAMUSCULAR; INTRAVENOUS; SUBCUTANEOUS at 09:28

## 2025-01-20 RX ADMIN — OXYCODONE 5 MG: 5 TABLET ORAL at 22:50

## 2025-01-20 ASSESSMENT — PAIN DESCRIPTION - PAIN TYPE
TYPE: ACUTE PAIN
TYPE: SURGICAL PAIN
TYPE: ACUTE PAIN
TYPE: ACUTE PAIN
TYPE: ACUTE PAIN;SURGICAL PAIN
TYPE: SURGICAL PAIN
TYPE: SURGICAL PAIN

## 2025-01-20 ASSESSMENT — PAIN SCALES - GENERAL: PAIN_LEVEL: 6

## 2025-01-20 ASSESSMENT — FIBROSIS 4 INDEX: FIB4 SCORE: 1.569777054234135504

## 2025-01-20 NOTE — OR NURSING
1026 patient arrival from OR; patient attached to monitor and VSS with patient on 3L O2 via mask with oral airway in place. Report received from MD and RNs. Surgical lap sites x3 to abdomen visualized, closed with dermabond and are clean, dry and intact. Peripad present with no drainage present. Roberts catheter in place, hung below bladder with blue tinted urine present.     See flow sheets for vitals and interventions.     1035 hand off to GIOVANNI Cehng.

## 2025-01-20 NOTE — ANESTHESIA TIME REPORT
Anesthesia Start and Stop Event Times       Date Time Event    1/20/2025 0731 Ready for Procedure     0740 Anesthesia Start     1058 Anesthesia Stop          Responsible Staff  01/20/25      Name Role Begin End    Delfina Enamorado M.D. Anesth 0740 1058          Overtime Reason:  no overtime (within assigned shift)    Comments:

## 2025-01-20 NOTE — PROGRESS NOTES
1140 Received report from GIOVANNI Cheng.  1207 Patient arrived by Tressa, assumed care of patient. Patient oriented to room, call light, pain management, Ambulatory precautions, and instructions to advance diet as tolerate. Patient gave verbal understanding.

## 2025-01-20 NOTE — DISCHARGE INSTRUCTIONS
If any questions arise, call your provider.  If your provider is not available, please feel free to call the [facility] at [phone].    MEDICATIONS: Resume taking daily medication.  Take prescribed pain medication with food.  If no medication is prescribed, you may take non-aspirin pain medication if needed.  PAIN MEDICATION CAN BE VERY CONSTIPATING.  Take a stool softener or laxative such as senokot, pericolace, or milk of magnesia if needed.    Last pain medication given at   Tylenol given in pre-op at 6:40 am. Next dose can be taken after 12:40 pm.   Toradol ( NSAId like Iburpfoen) was given at 9:35 am. Next dosse of Iburpfoen can be taken after     What to Expect Post Anesthesia    Rest and take it easy for the first 24 hours.  A responsible adult is recommended to remain with you during that time.  It is normal to feel sleepy.  We encourage you to not do anything that requires balance, judgment or coordination.    FOR 24 HOURS DO NOT:  Drive, operate machinery or run household appliances.  Drink beer or alcoholic beverages.  Make important decisions or sign legal documents.    To avoid nausea, slowly advance diet as tolerated, avoiding spicy or greasy foods for the first day.  Add more substantial food to your diet according to your provider's instructions.  Babies can be fed formula or breast milk as soon as they are hungry.  INCREASE FLUIDS AND FIBER TO AVOID CONSTIPATION.    MILD FLU-LIKE SYMPTOMS ARE NORMAL.  YOU MAY EXPERIENCE GENERALIZED MUSCLE ACHES, THROAT IRRITATION, HEADACHE AND/OR SOME NAUSEA.    Total Laparoscopic Hysterectomy, Care After  This sheet gives you information about how to care for yourself after your procedure. Your health care provider may also give you more specific instructions. If you have problems or questions, contact your health care provider.  What can I expect after the procedure?  After the procedure, it is common to have:  Pain and bruising around your  incisions.  A sore throat, if a breathing tube was used during surgery.  Fatigue.  Poor appetite.  Less interest in sex.  If your ovaries were also removed, it is also common to have symptoms of menopause such as hot flashes, night sweats, and lack of sleep (insomnia).  Follow these instructions at home:  Bathing  Do not take baths, swim, or use a hot tub until your health care provider approves. You may need to only take showers for 2-3 weeks.  Keep your bandage (dressing) dry until your health care provider says it can be removed.  Incision care    Follow instructions from your health care provider about how to take care of your incisions. Make sure you:  Wash your hands with soap and water before you change your dressing. If soap and water are not available, use hand .  Change your dressing as told by your health care provider.  Leave stitches (sutures), skin glue, or adhesive strips in place. These skin closures may need to stay in place for 2 weeks or longer. If adhesive strip edges start to loosen and curl up, you may trim the loose edges. Do not remove adhesive strips completely unless your health care provider tells you to do that.  Check your incision area every day for signs of infection. Check for:  Redness, swelling, or pain.  Fluid or blood.  Warmth.  Pus or a bad smell.  Activity  Get plenty of rest and sleep.  Do not lift anything that is heavier than 10 lbs (4.5 kg) for one month after surgery, or as long as told by your health care provider.  Do not drive or use heavy machinery while taking prescription pain medicine.  Do not drive for 24 hours if you were given a medicine to help you relax (sedative).  Return to your normal activities as told by your health care provider. Ask your health care provider what activities are safe for you.  Lifestyle    Do not use any products that contain nicotine or tobacco, such as cigarettes and e-cigarettes. These can delay healing. If you need help  quitting, ask your health care provider.  Do not drink alcohol until your health care provider approves.  General instructions  Do not douche, use tampons, or have sex for at least 6 weeks, or as told by your health care provider.  Take over-the-counter and prescription medicines only as told by your health care provider.  To monitor yourself for a fever, take your temperature at least once a day during recovery.  If you struggle with physical or emotional changes after your procedure, speak with your health care provider or a therapist.  To prevent or treat constipation while you are taking prescription pain medicine, your health care provider may recommend that you:  Drink enough fluid to keep your urine clear or pale yellow.  Take over-the-counter or prescription medicines.  Eat foods that are high in fiber, such as fresh fruits and vegetables, whole grains, and beans.  Limit foods that are high in fat and processed sugars, such as fried and sweet foods.  Keep all follow-up visits as told by your health care provider. This is important.  Contact a health care provider if:  You have chills or a fever.  You have redness, swelling, or pain around an incision.  You have fluid or blood coming from an incision.  Your incision feels warm to the touch.  You have pus or a bad smell coming from an incision.  An incision breaks open.  You feel dizzy or light-headed.  You have pain or bleeding when you urinate.  You have diarrhea, nausea, or vomiting that does not go away.  You have abnormal vaginal discharge.  You have a rash.  You have pain that does not get better with medicine.  Get help right away if:  You have a fever and your symptoms suddenly get worse.  You have severe abdominal pain.  You have chest pain.  You have shortness of breath.  You faint.  You have pain, swelling, or redness on your leg.  You have heavy vaginal bleeding with blood clots.  Summary  After the procedure it is common to have abdominal pain.  Your provider will give you medication for this.  Do not take baths, swim, or use a hot tub until your health care provider approves.  Do not lift anything that is heavier than 10 lbs (4.5 kg) for one month after surgery, or as long as told by your health care provider.  Notify your provider if you have any signs or symptoms of infection after the procedure.  This information is not intended to replace advice given to you by your health care provider. Make sure you discuss any questions you have with your health care provider.  Document Released: 10/08/2014 Document Revised: 11/30/2018 Document Reviewed: 02/28/2018  [employer] Patient Education © 2020 [employer]      Discharge Instructions for Laparoscopic Hysterectomy  You had a procedure called laparoscopic hysterectomy. A surgeon removed your uterus using instruments inserted through small incisions in your abdomen. These incisions may be sore. You may also have pain in your upper back or shoulders. This is from the gas used to distend your abdomen to allow your healthcare provider to see inside your pelvis and do the procedure. This pain usually goes away in a day or two. It usually takes  1 to 4 weeks to recover from laparoscopic hysterectomy. But recovery time varies. Here's what you can do to speed your recovery after surgery.  Your surgeon may have done this procedure using a surgical robot. This is when the surgeon guides a special robot instead of guiding the surgical instruments by hand. This is commonly called robotic surgery. Robotic surgery also uses several small incisions. Recovery is generally the same as for laparoscopy.  Home care   Continue the coughing and deep breathing exercises that you learned in the hospital.  Take your medicines exactly as directed by your healthcare provider.  Prevent constipation:  Eat fruits, vegetables, and whole grains.  Drink  6 to 8  glasses of water a day, unless told to do otherwise.  Use a laxative or a mild  stool softener if your healthcare provider says it's OK.  Shower as usual. Wash your incisions with mild soap and water. Pat dry.  Don't use oils, powders, or lotions on your incisions.  Don't put anything in your vagina until your healthcare provider says it's safe to do so. Don't use tampons or douches. Don't have sex.  If you had both ovaries removed, report hot flashes, mood swings, and irritability to your healthcare provider. There may be medicines that can help you.    Activity  Ask your healthcare provider when you can start driving again. It's usually OK to drive as soon as you are free of pain and able to move comfortably from side to side. Don't drive while you are still taking opioid pain medicine.  Ask others to help with chores and errands while you recover.  Don’t lift anything heavier than  10 pounds for 6 weeks.  Don’t do strenuous activities, such as exercise, housework, or yardwork, until your healthcare provider says it's OK.  Walk as often as you feel able.  Climb stairs slowly and pause after every few steps.    Follow-up care  Make a follow-up appointment, if directed.  When to call your healthcare provider  Call your healthcare provider right away if you have any of the following:  Fever of 100.4° F ( 38°C) or higher, or as directed by your healthcare provider  Chills  Bright red vaginal bleeding or vaginal bleeding that soaks more than one sanitary pad per hour  A foul-smelling discharge from the vagina  Trouble urinating, leaking urine, or burning when you urinate  Severe pain or bloating in your abdomen  Redness, swelling, or drainage at your incision sites  Shortness of breath or chest pain  Nausea and vomiting  Pain, swelling, and redness in one of your legs

## 2025-01-20 NOTE — OR NURSING
1035- Report received from GIOVANNI Echevarria. Patient asleep currently with OPA in place on 6 liters oxygen via simple mask. Laparoscopic sites x 3 assessed. Lap sites closed with Dermabond and CDI.     1048- OPA removed.     1055- Dr. Osborn at bedside.     1105- RN updated patients significant other via telephone. All questions answered.     1111- Patient having 3/10 pain. Patient declined wanting to swallow any medications right now as they have made her nauseous in the past. Heat packs in place on lower abdomen.    1130- Patient states pain is tolerable at this time.     1141- Report given to GIOVANNI Allan. All questions answered.     1145- Patient placed on 10 liters oxymask per [facility] orders. Family brought back to bedside.     1200- Out of PACU to room. Accompanied by transport and family. All belongings with patient,

## 2025-01-20 NOTE — OR SURGEON
Immediate Post OP Note    PreOp Diagnosis: Abnormal uterine bleeding, dysmenorrhea, uterine anomaly      PostOp Diagnosis: Same as above      Procedure(s):  TOTAL LAPAROSCOPIC HYSTERECTOMY, BILATERAL SALPINGECTOMY, CYSTOSCOPY, reentry diagnostic laparoscopy for retrevial of retained specimen -     Surgeon(s):  NATHALY Nichole M.D. Sohini Ray, M.D. Sohini Ray, M.D.    Anesthesiologist/Type of Anesthesia:  Anesthesiologist: Delfina Enamorado M.D./General    Surgical Staff:  Circulator: Kathryn Whitaker R.N.; Caprice Baxter R.N.  Relief Circulator: Melinda Miller R.N.  Relief Scrub: Patricio Salas  Scrub Person: Anali العراقي    Specimens removed if any:  ID Type Source Tests Collected by Time Destination   A : uterus, cervix, right fallopian tube Tissue Uterus PATHOLOGY SPECIMEN Kristine Osborn M.D. 1/20/2025  9:35 AM    B : left fallopian tube Tissue Fallopian Tube PATHOLOGY SPECIMEN Kristine Osborn M.D. 1/20/2025  9:35 AM        Estimated Blood Loss: 50 ml    Findings: Normal appearing globular uterus with septum. Normal appearing ovaries and fallopian tubes bilaterally. 3 cm simple ovarian cyst on left.   Retained right fallopian tube requiring re entry into abdomen laparoscopically to remove.     Complications: None        1/20/2025 10:33 AM Kristine Osborn M.D.

## 2025-01-20 NOTE — ANESTHESIA PROCEDURE NOTES
Airway    Date/Time: 1/20/2025 7:40 AM    Performed by: Delfina Enamorado M.D.  Authorized by: Patti Tang M.D.    Location:  OR  Urgency:  Elective  Difficult Airway: No    Indications for Airway Management:  Anesthesia      Spontaneous Ventilation: absent    Sedation Level:  Deep  Preoxygenated: Yes    Patient Position:  Sniffing  Mask Difficulty Assessment:  1 - vent by mask  Final Airway Type:  Endotracheal airway  Final Endotracheal Airway:  ETT  Cuffed: Yes    Technique Used for Successful ETT Placement:  Direct laryngoscopy    Blade Type:  Flora  Laryngoscope Blade/Videolaryngoscope Blade Size:  3  ETT Size (mm):  6.5  Measured from:  Teeth  ETT to Teeth (cm):  21  Placement Verified by: capnometry    Cormack-Lehane Classification:  Grade I - full view of glottis  Number of Attempts at Approach:  1

## 2025-01-20 NOTE — OP REPORT
OPERATIVE REPORT    Date: 2025    Surgeon: Kristine Osborn M.D.    Assistant: Marisa Swift MD    Anesthesiologist: Delfina Enamorado MD    Pre-operative Diagnosis: Abnormal uterine bleeding, dysmenorrhea, uterine anomaly     Post-operative Diagnosis: Same as above    Procedure: TOTAL LAPAROSCOPIC HYSTERECTOMY, BILATERAL SALPINGECTOMY, CYSTOSCOPY, reentry diagnostic laparoscopy for retrevial of retained specimen     EBL: 50 ml    UOP: 200 ml via espinoza    Drains: Espinoza to gravity    Dispo: To PACU    Indications:   34 y.o.  with AUB, dysmenorrhea, uterine anomaly (septate uterus) desiring definitive surgical management via hysterectomy.  Several medical therapies for her symptoms are contraindicated and she is not a candidate for progesterone IUD due to uterine anomaly.   From her exam, total laparoscopic approach would be safest. Recommended removal of both fallopian tubes to prevent future tubal disease. Recommended ovarian conservation if ovaries are normal, to which she agrees.   Risks of surgery discussed with patient, including, but not limited to, bleeding, infection, damage to other organs such as bowel, bladder, ureters, and nerves, need for reoperation, need for blood transfusion if indicated.     Findings: Normal appearing globular uterus with septum. Normal appearing ovaries and fallopian tubes bilaterally. 3 cm simple ovarian cyst on left.   Retained right fallopian tube requiring re entry into abdomen laparoscopically to remove.     PROCEDURE IN DETAIL: After informed consent was obtained, the patient was taken to the operating room where general endotracheal anesthesia was administered without difficulty. She was then prepped and draped in the usual sterile fashion in the lithotomy position with Orlando stirrups. Preoperative antibiotics were administered. A formal time out was called prior to the procedure and the preoperative antibiotics were given. Examination under anesthesia was performed  and a Roberts catheter was placed under sterile technique. A small V-Care uterine manipulator was placed without any difficulty and the uterus was sounded to 7 cm. The procedure went towards the abdomen at this time. Lidocaine with epinephrine was Infiltrated into the umbilicus and a 5mm incision was made in the umbilicus with a scalpel.  The veress needle was placed intraabdominally- placement was confirmed with a drop in intraabdominal pressure with a slow and steady rise with insufflation of CO2 gas at a pressure of 15 mmHg and pneumoperitoneum was obtained with approximately 2.5 liters of CO2 gas. A 5mm trochar was placed over the laparoscope and the scope/trochar were advanced into the abdominal cavity under direct laparoscopic visualization.  Intraabdominal placement was confirmed and also to note that there was no injury to the underlying tissue. A survey of the pelvis and abdomen was then performed as noted above. The ureter on the right was identified and noted to have peristalsis.  The left ureter was unable to be viewed due to adherence of the colon to the left pelvic sidewall.   Marcaine with epinephrine was then injected into left lower quadrant approximately 2 cm medial to the anterior ischial spine and a 5 mm skin incision was made with a knife. The 5 mm trocar was then advanced under direct visualization with no injury to the underlying tissue. A 5mm trochar was placed in the right lower quadrant in the exact same fashion. The procedure was then started. There was a 3-4 cm large smooth simple ovarian cyst which was punctured and decompressed to allow for optimal viewing of anatomy. Cyst had clear fluid. Starting with the right side, the fallopian tube was identified and divided from the mesosalpinx towards the utero-ovarian pedicle. The utero-ovarian ligament was then identified and transected using the ligasure device. The round ligament on the right was then grasped with bipolar cautery, cauterized  and cut with the Ligasure. The anterior and posterior leaf of the broad ligament were then incised along the bladder reflection to the midline and the uterine arteries were then skeletonized, grasped with bipolar cautery, cauterized and transected with the Ligasure. Again, hemostasis was noted.      The procedure at this point went towards the patient's left. The left fallopian tube was identified and transected with the Ligasure device, and removed via the laparoscopic port and sent for pathology specimen.  Hemostasis was noted. The utero-ovarian ligament was identified and transected using the Ligasure device. The round ligament was then grasped with bipolar cautery, cauterized and cut with the Ligasure device. The anterior and posterior leaf of the broad ligament were then incised along the bladder reflection to the midline and the uterine arteries were then skeletonized and grasped with bipolar cautery, cauterized and transected with the Ligasure. Again, hemostasis was noted. The bladder was then gently dissected off the lower uterine segment and the cervix. The colpotomy was made in a circumferential fashion around the V-care ring using the J hook and the entire specimen was removed vaginally using double tooth tenaculum. Good hemostasis was observed. The vaginal cuff and angles were closed vaginally using an 0 Vicryl. Attention was then turned back abdominally and bilateral pedicles were hemostatic and vaginal cuff was hemostatic. Right ureters were again identified and found to be peristalsing bilaterally.     Roberts cather was removed and cystoscopy was performed demonstrating vigorous ureteral jets after administration of Bluedigo dye.   Roberts catheter was replaced.     We then went back up above, abdomen re insufflated and cuff and pedicles appeared hemostatic.      All instruments were removed under direct visualization as was the CO2 gas. The skin at all ports were reapproximated with 4-0 monocryl  subcuticular fashion.    Sponge, needle, instrument, and lap counts were correct x2.      It was noted by the scrub nurse that the uterus did not have the right fallopian tube. Vaginal exam was done on the patient and the fallopian tube was not in the vagina. The right fallopian tube was not on the surgical trays or with specimen. It was deemed safest to re enter laparoscopically to find the right fallopian tube which had likely avulsed on removal of the uterus vaginally. Patient was re intubated. Abdomen was prepped and skin incisions were re opened. he veress needle was placed intraabdominally- placement was confirmed with a drop in intraabdominal pressure with a slow and steady rise with insufflation of CO2 gas at a pressure of 15 mmHg and pneumoperitoneum was obtained with approximately 2.5 liters of CO2 gas. A 5mm trochar was placed over the laparoscope and the scope/trochar were advanced into the abdominal cavity under direct laparoscopic visualization.  Intraabdominal placement was confirmed and also to note that there was no injury to the underlying tissue. The fallopian tube was found to be laying on top of the omentum in the pelvis. It was grasped and removed intact through left port site and sent as pathology specimen. Camera and instruments were again removed, abdomen desufflated and ports removed and skin incisions closed with 4-0 monocryl, sealed with derma bond.   Sponge, laps and needle counts correct x 2 again.     Patient tolerated the procedure well and went to recovery room in stable condition.     Kristine Osborn M.D.    Obstetrics and Gynecology    1/20/2025 10:48 AM

## 2025-01-20 NOTE — ANESTHESIA POSTPROCEDURE EVALUATION
Patient: Aminata Lorenzana    Procedure Summary       Date: 01/20/25 Room / Location: [facility] ROOM 21 / SURGERY SAME DAY [facility]    Anesthesia Start: 0740 Anesthesia Stop: 1058    Procedure: TOTAL LAPAROSCOPIC HYSTERECTOMY, BILATERAL SALPINGECTOMY, CYSTOSCOPY, reentry diagnostic laparoscopy for retrevial of retained specimen (Bilateral: Abdomen) Diagnosis: (UTERINE ANOMALY, DYSMENORRHEA, MENORRHAGIA WITH REGULAR CYCLE)    Surgeons: Kristine Osborn M.D. Responsible Provider: Delfina Enamorado M.D.    Anesthesia Type: general ASA Status: 2            Final Anesthesia Type: general  Last vitals  BP   Blood Pressure: 95/53    Temp   36.1 °C (97 °F)    Pulse   75   Resp   (!) 22    SpO2   100 %      Anesthesia Post Evaluation    Patient location during evaluation: PACU  Patient participation: complete - patient participated  Level of consciousness: awake and alert  Pain score: 6    Airway patency: patent  Anesthetic complications: no  Cardiovascular status: hemodynamically stable  Respiratory status: acceptable    PONV: none          Encounter Notable Events   Notable Event Outcome Phase Comment   Unplanned reintubation  Intraprocedure reintubation due to retained specimen, laproscopic reentry        Nurse Pain Score: 7 (NPRS)

## 2025-01-21 ENCOUNTER — PATIENT MESSAGE (OUTPATIENT)
Dept: OBGYN | Facility: CLINIC | Age: 35
End: 2025-01-21
Payer: MEDICAID

## 2025-01-21 VITALS
OXYGEN SATURATION: 95 % | HEART RATE: 64 BPM | HEIGHT: 65 IN | BODY MASS INDEX: 19.43 KG/M2 | WEIGHT: 116.62 LBS | DIASTOLIC BLOOD PRESSURE: 65 MMHG | RESPIRATION RATE: 20 BRPM | SYSTOLIC BLOOD PRESSURE: 105 MMHG | TEMPERATURE: 98.7 F

## 2025-01-21 LAB
BASOPHILS # BLD AUTO: 0.4 % (ref 0–1.8)
BASOPHILS # BLD: 0.04 K/UL (ref 0–0.12)
EOSINOPHIL # BLD AUTO: 0.03 K/UL (ref 0–0.51)
EOSINOPHIL NFR BLD: 0.3 % (ref 0–6.9)
ERYTHROCYTE [DISTWIDTH] IN BLOOD BY AUTOMATED COUNT: 40.3 FL (ref 35.9–50)
HCT VFR BLD AUTO: 38.4 % (ref 37–47)
HGB BLD-MCNC: 13 G/DL (ref 12–16)
IMM GRANULOCYTES # BLD AUTO: 0.03 K/UL (ref 0–0.11)
IMM GRANULOCYTES NFR BLD AUTO: 0.3 % (ref 0–0.9)
LYMPHOCYTES # BLD AUTO: 2.09 K/UL (ref 1–4.8)
LYMPHOCYTES NFR BLD: 18.5 % (ref 22–41)
MCH RBC QN AUTO: 31 PG (ref 27–33)
MCHC RBC AUTO-ENTMCNC: 33.9 G/DL (ref 32.2–35.5)
MCV RBC AUTO: 91.6 FL (ref 81.4–97.8)
MONOCYTES # BLD AUTO: 0.97 K/UL (ref 0–0.85)
MONOCYTES NFR BLD AUTO: 8.6 % (ref 0–13.4)
NEUTROPHILS # BLD AUTO: 8.14 K/UL (ref 1.82–7.42)
NEUTROPHILS NFR BLD: 71.9 % (ref 44–72)
NRBC # BLD AUTO: 0 K/UL
NRBC BLD-RTO: 0 /100 WBC (ref 0–0.2)
PLATELET # BLD AUTO: 204 K/UL (ref 164–446)
PMV BLD AUTO: 11.3 FL (ref 9–12.9)
RBC # BLD AUTO: 4.19 M/UL (ref 4.2–5.4)
WBC # BLD AUTO: 11.3 K/UL (ref 4.8–10.8)

## 2025-01-21 PROCEDURE — 700102 HCHG RX REV CODE 250 W/ 637 OVERRIDE(OP): Mod: UD | Performed by: OBSTETRICS & GYNECOLOGY

## 2025-01-21 PROCEDURE — 770030 HCHG ROOM/CARE - EXTENDED RECOVERY EACH 15 MIN

## 2025-01-21 PROCEDURE — 36415 COLL VENOUS BLD VENIPUNCTURE: CPT

## 2025-01-21 PROCEDURE — A9270 NON-COVERED ITEM OR SERVICE: HCPCS | Mod: UD | Performed by: OBSTETRICS & GYNECOLOGY

## 2025-01-21 PROCEDURE — 85025 COMPLETE CBC W/AUTO DIFF WBC: CPT

## 2025-01-21 RX ORDER — SIMETHICONE 125 MG
125 TABLET,CHEWABLE ORAL 4 TIMES DAILY PRN
Status: DISCONTINUED | OUTPATIENT
Start: 2025-01-21 | End: 2025-01-21 | Stop reason: HOSPADM

## 2025-01-21 RX ORDER — HYDROCODONE BITARTRATE AND ACETAMINOPHEN 5; 325 MG/1; MG/1
1 TABLET ORAL EVERY 4 HOURS PRN
Qty: 20 TABLET | Refills: 0 | Status: SHIPPED | OUTPATIENT
Start: 2025-01-21 | End: 2025-01-26

## 2025-01-21 RX ORDER — IBUPROFEN 800 MG/1
800 TABLET, FILM COATED ORAL EVERY 8 HOURS PRN
Qty: 30 TABLET | Refills: 2 | Status: SHIPPED | OUTPATIENT
Start: 2025-01-21

## 2025-01-21 RX ORDER — SENNA AND DOCUSATE SODIUM 50; 8.6 MG/1; MG/1
1 TABLET, FILM COATED ORAL 2 TIMES DAILY
Qty: 60 TABLET | Refills: 2 | Status: SHIPPED | OUTPATIENT
Start: 2025-01-21 | End: 2025-02-22

## 2025-01-21 RX ADMIN — SIMETHICONE 125 MG: 125 TABLET, CHEWABLE ORAL at 09:49

## 2025-01-21 RX ADMIN — DOCUSATE SODIUM 100 MG: 100 CAPSULE, LIQUID FILLED ORAL at 06:40

## 2025-01-21 RX ADMIN — IBUPROFEN 800 MG: 800 TABLET, FILM COATED ORAL at 06:40

## 2025-01-21 RX ADMIN — ACETAMINOPHEN 1000 MG: 500 TABLET ORAL at 02:58

## 2025-01-21 RX ADMIN — ACETAMINOPHEN 1000 MG: 500 TABLET ORAL at 11:58

## 2025-01-21 RX ADMIN — OXYCODONE 5 MG: 5 TABLET ORAL at 10:00

## 2025-01-21 ASSESSMENT — PAIN DESCRIPTION - PAIN TYPE
TYPE: ACUTE PAIN
TYPE: ACUTE PAIN;SURGICAL PAIN

## 2025-01-21 NOTE — PROGRESS NOTES
1930- Received report from Mariely DAVILA. Reviewed POC with pt, verbalizes understanding and states no questions at this time. Assessment completed.

## 2025-01-21 NOTE — CARE PLAN
The patient is Stable - Low risk of patient condition declining or worsening    Shift Goals  Clinical Goals: VSS; Lochia WDL; Pain WDL  Patient Goals: pain control, rest  Family Goals: support    Progress made toward(s) clinical / shift goals:    Problem: Extended Recovery Optimal Care  Goal: Patient will achieve/maintain normal respiratory rate/effort  Outcome: Met  Goal: Alleviation or reduction of pain post surgery  Outcome: Met  Goal: Early mobilization post surgery  Outcome: Met     Problem: Respiratory/Oxygenation Function Post-Surgical  Goal: Patient will achieve/maintain normal respiratory rate/effort  Outcome: Met     Problem: Early Mobilization - Post Surgery  Goal: Early mobilization post surgery  Outcome: Met     Problem: Pain - Post Surgery  Goal: Alleviation or reduction of pain post surgery  Outcome: Met  Goal: Proper management of On-Q Pump  Outcome: Met     Problem: Wound/ / Incision Healing  Goal: Patient's wound/surgical incision will decrease in size and heals properly  Outcome: Met     Problem: Bowel Elimination - Post Surgical  Goal: Patient will resume regular bowel sounds and function with no discomfort or distention  Outcome: Met     Problem: Respiratory  Goal: Patient will achieve/maintain optimum respiratory ventilation and gas exchange  Outcome: Met

## 2025-01-21 NOTE — CARE PLAN
The patient is Stable - Low risk of patient condition declining or worsening    Shift Goals  Clinical Goals: VSS; Lochia WDL; Pain WDL    Progress made toward(s) clinical / shift goals:    Problem: Extended Recovery Optimal Care  Goal: Patient will achieve/maintain normal respiratory rate/effort  Outcome: Progressing  Goal: Alleviation or reduction of pain post surgery  Outcome: Progressing  Goal: Early mobilization post surgery  Outcome: Progressing     Problem: Respiratory/Oxygenation Function Post-Surgical  Goal: Patient will achieve/maintain normal respiratory rate/effort  Outcome: Progressing     Problem: Early Mobilization - Post Surgery  Goal: Early mobilization post surgery  Outcome: Progressing     Problem: Pain - Post Surgery  Goal: Alleviation or reduction of pain post surgery  Outcome: Progressing  Goal: Proper management of On-Q Pump  Outcome: Progressing     Problem: Wound/ / Incision Healing  Goal: Patient's wound/surgical incision will decrease in size and heals properly  Outcome: Progressing

## 2025-01-21 NOTE — PROGRESS NOTES
"Gynecology Progress Note    Patient: Aminata Lorenzana MRN: [id number]     YOB: 1990  Age: 35 y.o.  Sex: female    Unit: POSTPARTUM [facility] Room/Bed: S322/00 Location: [facility]     Admitting Physician: ALBERT SHI  Date of  Admission: 2025     Primary Care Physician: ALIYA Galan        ADMISSION Diagnoses:  Abnormal uterine bleeding (AUB) [N93.9]    SUBJECTIVE:  Aminata Lorenzana is a 35 y.o. female  admitted s/p TLH/BS/cysto for AUB, dysmenorrhea, uterine anomaly. Pain is controlled with pain medication. The patient is tolerating regular diet. She is ambulating.  Passing flatus. [facility]ed at 0645, awaiting to void. C/o some gas pain in ribs and shoulders, mild. Scant spotting.     Review of systems:  Constitutional- Neg  HEENT- Neg  Car - neg  Lungs- Neg  GI- Neg     OBJECTIVE:  Vital Signs: /59   Pulse 74   Temp 37.1 °C (98.8 °F) (Temporal)   Resp 18   Ht 1.651 m (5' 5\")   Wt 52.9 kg (116 lb 10 oz)   SpO2 95%   BMI 19.41 kg/m²   Body mass index is 19.41 kg/m².    Intake/Output Summary (Last 24 hours) at 2025 0921  Last data filed at 2025 0645  Gross per 24 hour   Intake 900 ml   Output 2075 ml   Net -1175 ml     Appearance/Psychiatric: to be in no distress  Constitutional: The patient is well nourished.  Cardiovascular: RRR  Respiratory: Unlabored  Gastrointestinal: Soft NDNT incisions c/d/I, no guarding or rebound.   The incisions are c/d/I. .    LABS:   Latest Reference Range & Units 25 07:15 25 06:39   WBC 4.8 - 10.8 K/uL 7.0 11.3 (H)   RBC 4.20 - 5.40 M/uL 4.61 4.19 (L)   Hemoglobin 12.0 - 16.0 g/dL 14.5 13.0   Hematocrit 37.0 - 47.0 % 41.9 38.4   MCV 81.4 - 97.8 fL 90.9 91.6   MCH 27.0 - 33.0 pg 31.5 31.0   MCHC 32.2 - 35.5 g/dL 34.6 33.9   RDW 35.9 - 50.0 fL 39.6 40.3   Platelet Count 164 - 446 K/uL 195 204   (H): Data is abnormally high  (L): Data is abnormally low      ASSESSMENT:   LOS: 0 days , 1 Day " Post-Op    Aminata Lorenzana is a 35 y.o. female  admitted s/p TLH/BS/cysto POD #1 doing well.     PLAN:     Awaiting voiding trial.   Anticipate DC home when voiding trial successful.     Electronically signed by:   Kristine Osborn M.D.  2025

## 2025-01-21 NOTE — DISCHARGE SUMMARY
"  Discharge Summary:      Rashmi Lorenzana    Admit Date:   2025  Discharge Date:  2025     Admitting diagnosis:  Abnormal uterine bleeding (AUB) [N93.9]  Discharge Diagnosis: Status post TLH, BS, cystoscopy        History:  Past Medical History:   Diagnosis Date    Asthma     Only when i have pneumonia    Bronchitis     Hemorrhagic disorder (HCC)     Intense bleeding on periods    Migraine with aura     presented with \"stroke like symptoms\"    Pneumonia 2024    Psychiatric problem     anxiety    Stroke (HCC)      OB History    Para Term  AB Living   0 0 0 0 0 0   SAB IAB Ectopic Molar Multiple Live Births   0 0 0 0 0 0        [place] (diagnostic), Eccles meal, and Cinnamon  Patient Active Problem List    Diagnosis Date Noted    Abnormal uterine bleeding (AUB) 2025    Diarrhea of presumed infectious origin 2024    Vaginal itching 2024    RSV (acute bronchiolitis due to respiratory syncytial virus) 2024    Acute respiratory failure (HCC) 2024    Acute asthma exacerbation 2024    Pneumonia due to organism 2024    Upper respiratory tract infection 2023    Uterine anomaly 2023    Migraine with aura and without status migrainosus, not intractable 2023    Menorrhagia with regular cycle 2023    Painful menstrual periods 2023    Neck pain 2023    UTI symptoms 2023    Encounter to establish care 2023        Hospital Course:   35 y.o. , s/p TLH/BS/cystoscopy on 25 admitted overnight for recovery. Patient postop course was unremarkable, with progressive advancement in diet , ambulation and toleration of oral analgesia. At this time voiding trial is commencing, awaiting void. She is passing flatus, tolerating regular diet with no N/V and pain is well controlled. Patient without complaints today and desires discharge.      Vitals:    25 1930 25 2209 25 0206 25 " 0552   BP: 94/53 96/60 99/61 109/59   Pulse: 66 63 70 74   Resp: 17 18 18 18   Temp: 36.8 °C (98.2 °F) 37.2 °C (99 °F) 37.2 °C (98.9 °F) 37.1 °C (98.8 °F)   TempSrc: Temporal Temporal Temporal Temporal   SpO2: 97% 96% 95% 95%   Weight:       Height:           Current Facility-Administered Medications   Medication Dose    simethicone (Mylicon) chewable tablet 125 mg  125 mg    scopolamine (Transderm-Scop) patch 1 Patch  1 Patch    albuterol inhaler 2 Puff  2 Puff    Pharmacy Consult Request ...Pain Management Review 1 Each  1 Each    ondansetron (Zofran) syringe/vial injection 4 mg  4 mg    dexamethasone (Decadron) injection 4 mg  4 mg    diphenhydrAMINE (Benadryl) injection 25 mg  25 mg    haloperidol lactate (Haldol) injection 1 mg  1 mg    scopolamine (Transderm-Scop) patch 1 Patch  1 Patch    docusate sodium (Colace) capsule 100 mg  100 mg    senna-docusate (Pericolace Or Senokot S) 8.6-50 MG per tablet 1 Tablet  1 Tablet    senna-docusate (Pericolace Or Senokot S) 8.6-50 MG per tablet 1 Tablet  1 Tablet    polyethylene glycol/lytes (Miralax) Packet 1 Packet  1 Packet    magnesium hydroxide (Milk Of Magnesia) suspension 30 mL  30 mL    bisacodyl (Dulcolax) suppository 10 mg  10 mg    sodium phosphate enema 1 Each  1 Each    acetaminophen (Tylenol) tablet 1,000 mg  1,000 mg    Followed by    [START ON 1/25/2025] acetaminophen (Tylenol) tablet 1,000 mg  1,000 mg    ibuprofen (Motrin) tablet 800 mg  800 mg    Followed by    [START ON 1/25/2025] ibuprofen (Motrin) tablet 800 mg  800 mg    oxyCODONE immediate-release (Roxicodone) tablet 5 mg  5 mg    Or    oxyCODONE immediate release (Roxicodone) tablet 10 mg  10 mg    Or    morphine 4 MG/ML injection 4 mg  4 mg       Exam:    Abdomen: Soft NDNT incisions c/d/I.   IncisionS: dry and intact    Extremity: extremities, peripheral pulses and reflexes normal     Labs:  Recent Labs     01/20/25  0715 01/21/25  0639   WBC 7.0 11.3*   RBC 4.61 4.19*   HEMOGLOBIN 14.5 13.0    HEMATOCRIT 41.9 38.4   MCV 90.9 91.6   MCH 31.5 31.0   MCHC 34.6 33.9   RDW 39.6 40.3   PLATELETCT 195 204   MPV 11.1 11.3        Activity:   Discharge to home  Pelvic Rest x 6 weeks    Assessment:  34 yo s/p TLH/BS/Cystoscopy for AUB/dysmenorrhea, uterine anomaly POD #1 doing well.      Follow up:   With Dr. Osborn at [facility] in 2 weeks for postop check.      Discharge Meds:   Current Outpatient Medications   Medication Sig Dispense Refill    HYDROcodone-acetaminophen (NORCO) 5-325 MG Tab per tablet Take 1 Tablet by mouth every four hours as needed (moderate pain) for up to 5 days. 20 Tablet 0    ibuprofen (MOTRIN) 800 MG Tab Take 1 Tablet by mouth every 8 hours as needed for Moderate Pain. 30 Tablet 2    sennosides-docusate sodium (SENOKOT-S) 8.6-50 MG tablet Take 1 Tablet by mouth 2 times a day. 60 Tablet 2       Kristine Osborn M.D.

## 2025-01-21 NOTE — CARE PLAN
Problem: Early Mobilization - Post Surgery  Goal: Early mobilization post surgery  Outcome: Progressing  Note: Out of bed on post op day 0. Ambulate three times a day post-op day one, advance activity as tolerated. Up in chair for meals. Pain management adequate to allow progressive mobilization.     Problem: Pain - Post Surgery  Goal: Alleviation or reduction of pain post surgery  Outcome: Progressing  Note: Pain assessed q2 hours for 24 hours. Transition to oral medications as appropriate. Administer pain medication per MD orders and PRN.        The patient is Stable - Low risk of patient condition declining or worsening    Shift Goals  Clinical Goals: VS WDL, pain control  Patient Goals: pain control, rest  Family Goals: support    Progress made toward(s) clinical / shift goals: Progressing

## 2025-01-21 NOTE — PROGRESS NOTES
0786 Assessment completed. Plan of care reviewed. Patient is due to void by 1230. Declines pain intervention at this time, will call if pain intervention needed.   1340 Discharge instructions and education reviewed with patient, verbalized understanding, papers signed. Left facility escorted by staff.

## 2025-01-24 ENCOUNTER — HOSPITAL ENCOUNTER (EMERGENCY)
Facility: MEDICAL CENTER | Age: 35
End: 2025-01-24
Payer: MEDICAID

## 2025-01-24 VITALS
BODY MASS INDEX: 19.43 KG/M2 | SYSTOLIC BLOOD PRESSURE: 116 MMHG | TEMPERATURE: 97.7 F | HEIGHT: 65 IN | WEIGHT: 116.62 LBS | RESPIRATION RATE: 17 BRPM | OXYGEN SATURATION: 96 % | DIASTOLIC BLOOD PRESSURE: 85 MMHG | HEART RATE: 105 BPM

## 2025-01-24 PROCEDURE — 302449 STATCHG TRIAGE ONLY (STATISTIC)

## 2025-01-24 ASSESSMENT — FIBROSIS 4 INDEX: FIB4 SCORE: 1.73

## 2025-01-24 NOTE — ED TRIAGE NOTES
Aminata Lorenzana  35 y.o. female  Chief Complaint   Patient presents with    Post-Op Complications     Pt states she had a laparoscopic hysterectomy on 1/20/25. Pt has not had a BM since and is having abd pain and difficultly urinating.      Pt ambulated to triage. Pt is alert, oriented, and follows commands. Pt speaking in full sentences and responds appropriately to questions. No acute distress noted in triage. Respirations are even and unlabored.     Pt to lobby and educated on triage process. Pt encouraged to alert triage staff for any changes in condition. Pt signed up for messaging updates prior to leaving triage room.    Vitals:    01/24/25 0655   BP: 116/85   Pulse: (!) 105   Resp: 17   Temp: 36.5 °C (97.7 °F)   SpO2: 96%

## 2025-01-24 NOTE — ED NOTES
Pt expressing decision to leave without being seen by a physician. Pt reports she had a BM in restroom and was able to urinate and is now feeling better. Encouraged patient to return to ED at any time. Pt verbalized understanding and agreed to sign AMA form. Pt will be dismissed from system.

## 2025-02-03 ENCOUNTER — APPOINTMENT (OUTPATIENT)
Dept: OBGYN | Facility: CLINIC | Age: 35
End: 2025-02-03
Payer: MEDICAID

## 2025-02-03 VITALS — DIASTOLIC BLOOD PRESSURE: 72 MMHG | BODY MASS INDEX: 18.64 KG/M2 | WEIGHT: 112 LBS | SYSTOLIC BLOOD PRESSURE: 100 MMHG

## 2025-02-03 DIAGNOSIS — Z09 POSTOP CHECK: ICD-10-CM

## 2025-02-03 PROCEDURE — 99024 POSTOP FOLLOW-UP VISIT: CPT | Performed by: OBSTETRICS & GYNECOLOGY

## 2025-02-03 PROCEDURE — 3078F DIAST BP <80 MM HG: CPT | Performed by: OBSTETRICS & GYNECOLOGY

## 2025-02-03 PROCEDURE — 3074F SYST BP LT 130 MM HG: CPT | Performed by: OBSTETRICS & GYNECOLOGY

## 2025-02-03 ASSESSMENT — FIBROSIS 4 INDEX: FIB4 SCORE: 1.73

## 2025-02-03 NOTE — PROGRESS NOTES
Postoperative Follow Up Visit    Aminata Lorenzana is a 35 y.o. female    Chief complaint    Chief Complaint   Patient presents with    Gynecologic Exam     POST-OP LAVH 1/20/2025       S/p TLH/BS/cysto on 1/20/25 for AUB presenting for postop check    COMPLAINTS:    Was having some significant constipation for few days post surgery. Went into ER on 1/24 for severe pain from constipation but then completely felt better after having a BM in ER and went home before being seen by a clinician.   She feels well today.   Denies F/C. Reports rare nausea. No emesis. Voiding well without difficulty. No vaginal bleeding or foul discharge.     Pathology and intraoperative images reviewed with patient.       OBJECTIVE:    /72 (BP Location: Left arm, Patient Position: Sitting)   Wt 112 lb   BMI 18.64 kg/m²     General: No acute distress, well nourished, alert.     ABDOMEN: Soft nondistended, nontender, no peritoneal signs, no masses.     INCISIONS: Clean, dry, intact, no drainage, erythema or separation. Surgical glue intact.     Pathology    1/20/25  SURGICAL PATHOLOGY CONSULTATION       FINAL DIAGNOSIS:     A. Uterus, cervix, right fallopian tube:          Cervix: Negative for dysplasia and carcinoma          Septate uterus           Endometrium: Proliferative endometrium, negative for atypical           hyperplasia and malignancy           Myometrium: No diagnostic abnormality          Right fallopian tube: Unremarkable fimbriated fallopian tube   B. Left fallopian tube:          Unremarkable fimbriated fallopian tube     A/P    1. Postop check        S/p TLH/BS/cysto on 1/20/25 for AUB doing well.     Postop precautions and limitations reviewed.     Constipation - continue stool softeners senna + and miralax.     Wants to go back to work in 1 week. Works as . Gave strict precautions with no heavy lifting > 5 lbs, straining, squatting, bending etc.     Follow up in 4 weeks for final postop check.     Kristine  TOYA Osborn.    Obstetrics and Gynecology    2/3/92129:11 PM

## 2025-02-03 NOTE — PROGRESS NOTES
Pt presents for pos-op visit. S/p [facility] 1/20/2025.  Doing well now,no bleeding.  Seen in ER for abd.pain, BM upon arrival relieved this pain.Now raiza Stool softeners.  Ph#[phone]  Pharm# [facility]

## 2025-02-03 NOTE — LETTER
February 3, 2025    To Whom It May Concern:         This is confirmation that Aminata Lorenzana attended her scheduled appointment with Kristine Osborn M.D. on 2/03/25. She will be released for light duty work only as of 2/10/2025.  No greater than 5lb of lifting,pushing or pulling.No overhead lifting, no bending,squatting or straining.         If you have any questions please do not hesitate to call me at the phone number listed below.    Sincerely,        Quinn For Dr.Sohini Osborn    [phone]

## 2025-02-20 ENCOUNTER — RESULTS FOLLOW-UP (OUTPATIENT)
Dept: MEDICAL GROUP | Facility: MEDICAL CENTER | Age: 35
End: 2025-02-20

## 2025-02-20 ENCOUNTER — OFFICE VISIT (OUTPATIENT)
Dept: MEDICAL GROUP | Facility: MEDICAL CENTER | Age: 35
End: 2025-02-20
Attending: FAMILY MEDICINE
Payer: MEDICAID

## 2025-02-20 VITALS
OXYGEN SATURATION: 98 % | HEIGHT: 65 IN | BODY MASS INDEX: 18.43 KG/M2 | WEIGHT: 110.6 LBS | SYSTOLIC BLOOD PRESSURE: 108 MMHG | RESPIRATION RATE: 16 BRPM | TEMPERATURE: 101.2 F | DIASTOLIC BLOOD PRESSURE: 62 MMHG | HEART RATE: 108 BPM

## 2025-02-20 DIAGNOSIS — J06.9 URI, ACUTE: ICD-10-CM

## 2025-02-20 DIAGNOSIS — J10.1 INFLUENZA B: ICD-10-CM

## 2025-02-20 LAB
FLUAV RNA SPEC QL NAA+PROBE: NEGATIVE
FLUBV RNA SPEC QL NAA+PROBE: POSITIVE
RSV RNA SPEC QL NAA+PROBE: NEGATIVE
S PYO DNA SPEC NAA+PROBE: NOT DETECTED
SARS-COV-2 RNA RESP QL NAA+PROBE: NEGATIVE

## 2025-02-20 PROCEDURE — 87651 STREP A DNA AMP PROBE: CPT | Performed by: FAMILY MEDICINE

## 2025-02-20 PROCEDURE — 0241U POCT CEPHEID COV-2, FLU A/B, RSV - PCR: CPT | Performed by: FAMILY MEDICINE

## 2025-02-20 PROCEDURE — 99214 OFFICE O/P EST MOD 30 MIN: CPT | Performed by: FAMILY MEDICINE

## 2025-02-20 RX ORDER — OSELTAMIVIR PHOSPHATE 75 MG/1
75 CAPSULE ORAL 2 TIMES DAILY
Qty: 10 CAPSULE | Refills: 0 | Status: SHIPPED | OUTPATIENT
Start: 2025-02-20

## 2025-02-20 RX ORDER — BENZONATATE 100 MG/1
100 CAPSULE ORAL 3 TIMES DAILY PRN
Qty: 60 CAPSULE | Refills: 0 | Status: SHIPPED | OUTPATIENT
Start: 2025-02-20

## 2025-02-20 ASSESSMENT — FIBROSIS 4 INDEX: FIB4 SCORE: 1.73

## 2025-02-20 NOTE — PROGRESS NOTES
Verbal consent was acquired by the patient to use [employer] ambient listening note generation during this visit.    Subjective   No chief complaint on file.       HPI:   Aminata presents today with    History of Present Illness  The patient presents for evaluation of fever, cough, and rhinorrhea. She is accompanied by her partner Jung.    She reports experiencing high fevers at home, peaking at 103 degrees, which have been resistant to reduction with ibuprofen. She also describes a persistent runny nose, necessitating the use of 3 boxes of tissues, and frequent sneezing episodes, each consisting of approximately 7 consecutive sneezes. Additionally, she has developed a cough productive of yellow and brown phlegm as of today. She attributes a concurrent sore throat to the combination of coughing and nasal drainage. Her symptoms began on Monday night. She expresses concern about potential lung involvement, given her history of respiratory issues. She has been unable to work due to her symptoms, having last worked on Monday and Tuesday. She took DayQuil approximately 1.5 hours ago. She has been wheezing the whole time until she came to the doctor.    She underwent a hysterectomy 4 weeks ago and expresses concern about the impact of her current symptoms on her surgical recovery, particularly noting a burning sensation at her incision site. She continues to take stool softeners and ibuprofen in an attempt to manage her fever.    MEDICATIONS  Current: Ibuprofen, stool softeners, DayQuil.      Health Maintenance Due   Topic Date Due    HIV Screening  Never done    IMM DTaP/Tdap/Td Vaccine (6 - Tdap) 01/12/1999    Chickenpox Vaccine (Varicella) (1 of 2 - 13+ 2-dose series) Never done    Hepatitis B Vaccine (Hep B) (1 of 3 - 19+ 3-dose series) Never done    Pneumococcal Vaccine: 0-49 Years (1 of 2 - PCV) Never done    Influenza Vaccine (1) 09/01/2024    COVID-19 Vaccine (1 - 2024-25 season) Never done       Objective  "  Social History     Tobacco Use    Smoking status: Former     Current packs/day: 0.00     Average packs/day: 2.0 packs/day for 9.0 years (18.0 ttl pk-yrs)     Types: Cigarettes     Start date:      Quit date:      Years since quittin.1    Smokeless tobacco: Never   Vaping Use    Vaping status: Never Used   Substance Use Topics    Alcohol use: Yes     Alcohol/week: 1.2 oz     Types: 2 Glasses of wine per week    Drug use: Yes     Types: Inhaled     Comment: marijuana       Exam:  /62   Pulse (!) 108   Temp (!) 38.4 °C (101.2 °F)   Resp 16   Ht 1.651 m (5' 5\")   Wt 50.2 kg (110 lb 9.6 oz)   SpO2 98%   BMI 18.40 kg/m²     Physical Exam  Constitutional: Alert, no distress, acutely ill  Skin: No rashes in visible areas  Eye: Conjunctiva clear, lids normal  Respiratory: Unlabored respiratory effort, +cough, lungs clear to auscultation bilaterally   Oropharynx: clear, mildly erythematous, no exudates  MSK: Normal gait, moves all extremities  Psych: Alert and oriented x3, normal affect and mood    Allergies   Allergen Reactions    [place] (Diagnostic) Hives, Shortness of Breath, Rash, Itching, Swelling and Anxiety     Other reaction(s): Unspecified    [place] Meal Unspecified          Cinnamon Hives, Shortness of Breath, Rash, Itching, Swelling and Anxiety             [id number] [place] - [address]  [address] [address]  Phone: [phone] Fax: [phone]    Current Outpatient Medications   Medication Sig Dispense Refill    benzonatate (TESSALON) 100 MG Cap Take 1 Capsule by mouth 3 times a day as needed for Cough. 60 Capsule 0    oseltamivir (TAMIFLU) 75 MG Cap Take 1 Capsule by mouth 2 times a day. 10 Capsule 0    ibuprofen (MOTRIN) 800 MG Tab Take 1 Tablet by mouth every 8 hours as needed for Moderate Pain. 30 Tablet 2    sennosides-docusate sodium (SENOKOT-S) 8.6-50 MG tablet Take 1 Tablet by mouth 2 times a day. 60 Tablet 2    Probiotic Product (PRO-BIOTIC BLEND PO) Take "  by mouth every day at 6 PM.      SUMAtriptan (IMITREX) 50 MG Tab TAKE 1 TABLET BY MOUTH ONE TIME AS NEEDED FOR MIGRAINE. MAY REPEAT ONCE AFTER 2 HOURS 9 Tablet 0    albuterol 108 (90 Base) MCG/ACT Aero Soln inhalation aerosol Inhale 2 Puffs every 6 hours as needed for Shortness of Breath. (Patient not taking: Reported on 1/2/2025) 8.5 g 1     No current facility-administered medications for this visit.       Assessment & Plan    35 y.o. female with the following -   1. URI, acute  CoV-2, Flu A/B, And RSV by PCR (Cepheid)    POCT CEPHEID GROUP A STREP - PCR    benzonatate (TESSALON) 100 MG Cap      2. Influenza B  oseltamivir (TAMIFLU) 75 MG Cap        Assessment & Plan  1. Fever, cough, and rhinorrhea: High fever, runny nose, sneezing, and coughing with yellow and brown phlegm suggest a viral etiology. Fever raises suspicion for influenza, a viral infection. Swabs for COVID-19, RSV, and influenza A have been obtained. A strep test has also been conducted to rule out streptococcal infection. Recent hysterectomy 4 weeks ago. Differential diagnosis includes COVID-19, RSV, and influenza A.  - Maintain hydration, consume hot tea with honey, and rest as much as possible.  - Prescription for benzonatate to alleviate cough.  - Wear a mask and practice frequent handwashing when outside.  - Work note excusing through the weekend issued.  - Seek immediate medical attention at the ER if symptoms worsen, difficulty breathing, or fever remains unmanageable.  -  POCT consistent with Influenza B; recommend Tamiflu to help reduce severity and complications.    2. Post-surgical status: Concern about pressure from coughing and sneezing on recent hysterectomy incisions, which are starting to burn. Low suspicion of internal infection spreading from the surgical site.  - Monitor for any signs of infection and seek medical attention if symptoms persist or worsen.    Follow-up  - Follow up on swab results before the end of the day.  -  Obtain a copy of the work note from the .    PROCEDURE  The patient underwent a hysterectomy 4 weeks ago.    ER/Call/Return precautions discussed. She verbalized understanding and agreed with plan.      Return if symptoms worsen or fail to improve.    Please be advised that this document was generated using voice recognition software. While I have made reasonable efforts to correct any obvious errors, there may still be grammatical or content inaccuracies that were not identified or corrected prior to finalization.

## 2025-02-22 ENCOUNTER — OFFICE VISIT (OUTPATIENT)
Dept: URGENT CARE | Facility: CLINIC | Age: 35
End: 2025-02-22
Payer: MEDICAID

## 2025-02-22 ENCOUNTER — APPOINTMENT (OUTPATIENT)
Dept: RADIOLOGY | Facility: IMAGING CENTER | Age: 35
End: 2025-02-22
Attending: FAMILY MEDICINE
Payer: MEDICAID

## 2025-02-22 VITALS
TEMPERATURE: 99.9 F | HEIGHT: 65 IN | OXYGEN SATURATION: 99 % | BODY MASS INDEX: 17.66 KG/M2 | HEART RATE: 90 BPM | WEIGHT: 106 LBS | SYSTOLIC BLOOD PRESSURE: 108 MMHG | DIASTOLIC BLOOD PRESSURE: 64 MMHG | RESPIRATION RATE: 16 BRPM

## 2025-02-22 DIAGNOSIS — J10.1 INFLUENZA A: ICD-10-CM

## 2025-02-22 PROCEDURE — 3074F SYST BP LT 130 MM HG: CPT | Performed by: FAMILY MEDICINE

## 2025-02-22 PROCEDURE — 99213 OFFICE O/P EST LOW 20 MIN: CPT | Performed by: FAMILY MEDICINE

## 2025-02-22 PROCEDURE — 3078F DIAST BP <80 MM HG: CPT | Performed by: FAMILY MEDICINE

## 2025-02-22 PROCEDURE — 71046 X-RAY EXAM CHEST 2 VIEWS: CPT | Mod: TC | Performed by: RADIOLOGY

## 2025-02-22 RX ORDER — ALBUTEROL SULFATE 90 UG/1
2 INHALANT RESPIRATORY (INHALATION) EVERY 6 HOURS PRN
Qty: 8.5 G | Refills: 0 | Status: SHIPPED | OUTPATIENT
Start: 2025-02-22

## 2025-02-22 RX ORDER — DEXTROMETHORPHAN HYDROBROMIDE AND PROMETHAZINE HYDROCHLORIDE 15; 6.25 MG/5ML; MG/5ML
5 SYRUP ORAL EVERY EVENING
Qty: 118 ML | Refills: 0 | Status: SHIPPED | OUTPATIENT
Start: 2025-02-22

## 2025-02-22 ASSESSMENT — ENCOUNTER SYMPTOMS
COUGH: 1
FEVER: 1

## 2025-02-22 ASSESSMENT — FIBROSIS 4 INDEX: FIB4 SCORE: 1.73

## 2025-02-23 NOTE — PROGRESS NOTES
Subjective:     Aminata Lorenzana is a 35 y.o. female who presents for Cough (Colored mucus, wheezing, cough, concerned about pneumonia)    HPI  Pt presents for evaluation of an acute problem  Pt with an illness the past 5 days   Was initially diagnosed with influenza A on day 3 of illness and was started on Tamiflu  Has continued to have cough, wheezing, and sputum production  Feeling fatigue  Does not feel she is recovering very quickly and concern for pneumonia  Has had pneumonia multiple times in the past    Review of Systems   Constitutional:  Positive for fever and malaise/fatigue.   HENT:  Positive for congestion.    Respiratory:  Positive for cough.        PMH:  has a past medical history of Asthma (1995), Bronchitis, Hemorrhagic disorder ([facility]) (2001), Migraine with aura, Pneumonia (1995), Psychiatric problem, and Stroke ([facility]) (2017).    She has no past medical history of Cough, Painful breathing, Shortness of breath, Sputum production, or Wheezing.  MEDS:   Current Outpatient Medications:     promethazine-dextromethorphan (PROMETHAZINE-DM) 6.25-15 MG/5ML syrup, Take 5 mL by mouth every evening., Disp: 118 mL, Rfl: 0    albuterol 108 (90 Base) MCG/ACT Aero Soln inhalation aerosol, Inhale 2 Puffs every 6 hours as needed for Shortness of Breath., Disp: 8.5 g, Rfl: 0    benzonatate (TESSALON) 100 MG Cap, Take 1 Capsule by mouth 3 times a day as needed for Cough., Disp: 60 Capsule, Rfl: 0    oseltamivir (TAMIFLU) 75 MG Cap, Take 1 Capsule by mouth 2 times a day., Disp: 10 Capsule, Rfl: 0    ibuprofen (MOTRIN) 800 MG Tab, Take 1 Tablet by mouth every 8 hours as needed for Moderate Pain., Disp: 30 Tablet, Rfl: 2    Probiotic Product (PRO-BIOTIC BLEND PO), Take  by mouth every day at 6 PM., Disp: , Rfl:     SUMAtriptan (IMITREX) 50 MG Tab, TAKE 1 TABLET BY MOUTH ONE TIME AS NEEDED FOR MIGRAINE. MAY REPEAT ONCE AFTER 2 HOURS, Disp: 9 Tablet, Rfl: 0  ALLERGIES:   Allergies   Allergen Reactions    [place]  "(Diagnostic) Hives, Shortness of Breath, Rash, Itching, Swelling and Anxiety     Other reaction(s): Unspecified    [place] Meal Unspecified          [place] Hives, Shortness of Breath, Rash, Itching, Swelling and Anxiety           SURGHX:   Past Surgical History:   Procedure Laterality Date    HYSTERECTOMY LAPAROSCOPY Bilateral 1/20/2025    Procedure: TOTAL LAPAROSCOPIC HYSTERECTOMY, BILATERAL SALPINGECTOMY, CYSTOSCOPY, reentry diagnostic laparoscopy for retrevial of retained specimen;  Surgeon: Kristine Osborn M.D.;  Location: SURGERY SAME DAY [facility];  Service: Gynecology    NO PERTINENT PAST SURGICAL HISTORY      OTHER  2014    [facility]s Teeth Removal     SOCHX:  reports that she quit smoking about 14 years ago. Her smoking use included cigarettes. She started smoking about 23 years ago. She has a 18 pack-year smoking history. She has never used smokeless tobacco. She reports current alcohol use of about 1.2 oz of alcohol per week. She reports current drug use. Drug: Inhaled.     Objective:   /64 (BP Location: Left arm, Patient Position: Sitting, BP Cuff Size: Adult)   Pulse 90   Temp 37.7 °C (99.9 °F) (Temporal)   Resp 16   Ht 1.651 m (5' 5\")   Wt 48.1 kg (106 lb)   SpO2 99%   BMI 17.64 kg/m²     Physical Exam  Constitutional:       General: She is not in acute distress.     Appearance: She is well-developed. She is not diaphoretic.   HENT:      Head: Normocephalic and atraumatic.      Right Ear: Tympanic membrane, ear canal and external ear normal.      Left Ear: Tympanic membrane, ear canal and external ear normal.      Nose: Congestion present.      Mouth/Throat:      Mouth: Mucous membranes are moist.      Pharynx: Oropharynx is clear. No oropharyngeal exudate or posterior oropharyngeal erythema.   Neck:      Trachea: No tracheal deviation.   Cardiovascular:      Rate and Rhythm: Normal rate and regular rhythm.   Pulmonary:      Effort: Pulmonary effort is normal. No respiratory distress.      Breath " sounds: Normal breath sounds. No wheezing or rales.   Musculoskeletal:      Cervical back: Normal range of motion and neck supple. No tenderness.   Lymphadenopathy:      Cervical: No cervical adenopathy.   Skin:     General: Skin is warm and dry.      Findings: No rash.   Neurological:      Mental Status: She is alert.         Assessment/Plan:   Assessment    1. Influenza A  - DX-CHEST-2 VIEWS; Future  - promethazine-dextromethorphan (PROMETHAZINE-DM) 6.25-15 MG/5ML syrup; Take 5 mL by mouth every evening.  Dispense: 118 mL; Refill: 0  - albuterol 108 (90 Base) MCG/ACT Aero Soln inhalation aerosol; Inhale 2 Puffs every 6 hours as needed for Shortness of Breath.  Dispense: 8.5 g; Refill: 0    Patient with influenza A.  Lungs clear and no signs of secondary infection on exam.  Did obtain chest x-ray as patient has history of recurrent pneumonia and highly concerned she could have pneumonia at this time.  Chest x-ray is clear.  Reviewed supportive care measures and recommended use of inhaler and stronger cough medication.  Follow-up in the urgent care as needed.

## 2025-02-25 ENCOUNTER — GYNECOLOGY VISIT (OUTPATIENT)
Dept: OBGYN | Facility: CLINIC | Age: 35
End: 2025-02-25
Payer: MEDICAID

## 2025-02-25 VITALS
BODY MASS INDEX: 18.49 KG/M2 | WEIGHT: 111 LBS | HEIGHT: 65 IN | SYSTOLIC BLOOD PRESSURE: 104 MMHG | HEART RATE: 78 BPM | DIASTOLIC BLOOD PRESSURE: 79 MMHG

## 2025-02-25 DIAGNOSIS — Z09 POSTOP CHECK: ICD-10-CM

## 2025-02-25 PROCEDURE — 99024 POSTOP FOLLOW-UP VISIT: CPT | Performed by: OBSTETRICS & GYNECOLOGY

## 2025-02-25 PROCEDURE — 3074F SYST BP LT 130 MM HG: CPT | Performed by: OBSTETRICS & GYNECOLOGY

## 2025-02-25 PROCEDURE — 3078F DIAST BP <80 MM HG: CPT | Performed by: OBSTETRICS & GYNECOLOGY

## 2025-02-25 ASSESSMENT — FIBROSIS 4 INDEX: FIB4 SCORE: 1.73

## 2025-02-25 NOTE — PROGRESS NOTES
"Postoperative Follow Up Visit    Aminata Lorenzana is a 35 y.o. female    Chief complaint    Chief Complaint   Patient presents with    Post-op       S/p TLH/BS/cysto on 1/20/25 for AUB presenting for postop check; She reports having had the flu starting 1.5 weeks ago and since then has had repeated coughing, sneezing and subsequent soreness at her incision sites and lower abdomen. Did have fevers and diarrhea during her flu episode. No emesis. Was using ibuprofen which helped pain. Woke up today and feeling a lot better but wanted to make sure her incisions were not torn.   No problems voiding. No vaginal bleeding or foul discharge.     OBJECTIVE:    /79 (BP Location: Right arm, Patient Position: Sitting, BP Cuff Size: Adult)   Pulse 78   Ht 5' 5\"   Wt 111 lb   LMP 12/12/2024   BMI 18.47 kg/m²     General: No acute distress, well nourished, alert.     ABDOMEN: Soft nondistended, nontender, no peritoneal signs, no masses.     INCISIONS: Clean, dry, intact, no drainage, erythema or separation. No palpable hernias.     A/P    1. Postop check        S/p TLH/BS/cysto on 1/20/25 for AUB likely strained incisions from repeated coughing/sneezing due to URI/flu. Advised to wear belly binder and use OTC ibuprofen PRN.  Avoid straining of abdomen. Postop limitations again reviewed.     Follow up in for final postop check as scheduled.     Kristine Osborn M.D.    Obstetrics and Gynecology    2/25/20259:27 AM  "

## 2025-02-25 NOTE — PROGRESS NOTES
Pt here for second post op visit.  Date of surgery: 1.20.25  Procedure: TOTAL LAPAROSCOPIC HYSTERECTOMY, BILATERAL SALPINGECTOMY, CYSTOSCOPY, reentry diagnostic laparoscopy for retrevial of retained specimen     Pt states: she has gotten sick with the flu about 1.5 weeks ago and while in recovery she has been in lots of pain around the incision and lower abdomen. Sharp, aching and tearing sensation.      Ph/pharm verified: [phone]

## 2025-03-11 ENCOUNTER — GYNECOLOGY VISIT (OUTPATIENT)
Dept: OBGYN | Facility: CLINIC | Age: 35
End: 2025-03-11
Payer: MEDICAID

## 2025-03-11 VITALS
SYSTOLIC BLOOD PRESSURE: 95 MMHG | BODY MASS INDEX: 18.66 KG/M2 | HEIGHT: 65 IN | HEART RATE: 75 BPM | WEIGHT: 112 LBS | DIASTOLIC BLOOD PRESSURE: 65 MMHG

## 2025-03-11 DIAGNOSIS — Z09 POSTOP CHECK: ICD-10-CM

## 2025-03-11 PROCEDURE — 99024 POSTOP FOLLOW-UP VISIT: CPT | Performed by: OBSTETRICS & GYNECOLOGY

## 2025-03-11 PROCEDURE — 3078F DIAST BP <80 MM HG: CPT | Performed by: OBSTETRICS & GYNECOLOGY

## 2025-03-11 PROCEDURE — 3074F SYST BP LT 130 MM HG: CPT | Performed by: OBSTETRICS & GYNECOLOGY

## 2025-03-11 ASSESSMENT — FIBROSIS 4 INDEX: FIB4 SCORE: 1.73

## 2025-03-11 NOTE — PROGRESS NOTES
"Postoperative Follow Up Visit    Aminata Lorenzana is a 35 y.o. female    Chief complaint    No chief complaint on file.      S/p TLH/BS/cysto on 1/20/25 for AUB presenting for postop check    COMPLAINTS:    Recovered from Influenza B. No longer coughing or congested. No fevers/chills, no N/V, no constipation/diarrhea. No problems voiding. No foul discharge or bleeding. Very happy with recovery.       OBJECTIVE:    BP 95/65 (BP Location: Left arm, Patient Position: Sitting, BP Cuff Size: Large adult)   Pulse 75   Ht 5' 5\"   Wt 112 lb   LMP 12/12/2024   BMI 18.64 kg/m²     General: No acute distress, well nourished, alert.     ABDOMEN: Soft nondistended, nontender, no peritoneal signs, no masses.     INCISION: Clean, dry, intact, no drainage, erythema or separation.  : normal vulva, vagina. Cuff intact well healed, scant brown discharge with dissolving sutures on Q tip exploration. Bimanual exam cuff intact no masses or tenderness.     Pathology  1/20/25  SURGICAL PATHOLOGY CONSULTATION       FINAL DIAGNOSIS:     A. Uterus, cervix, right fallopian tube:          Cervix: Negative for dysplasia and carcinoma          Septate uterus           Endometrium: Proliferative endometrium, negative for atypical           hyperplasia and malignancy           Myometrium: No diagnostic abnormality          Right fallopian tube: Unremarkable fimbriated fallopian tube   B. Left fallopian tube:          Unremarkable fimbriated fallopian tube     A/P    1. Postop check        S/p TLH/BS/cysto on 1/20/25 for AUB  doing well. Patient very appreciative of surgery and recovery.     May return to normal activities ie exercise, lifting and intercourse.     RTC in 1 year or PRN.     Kristine Osborn M.D.    Obstetrics and Gynecology    3/11/64307:25 PM  "

## 2025-03-11 NOTE — NON-PROVIDER
Post Op visit  Surgery date: 01/20/2025  Procedure: TOTAL LAPAROSCOPIC HYSTERECTOMY, BILATERAL SALPINGECTOMY, CYSTOSCOPY, reentry diagnostic laparoscopy for retrevial of retained specimen   Good # [phone]

## 2025-05-14 ENCOUNTER — OFFICE VISIT (OUTPATIENT)
Dept: MEDICAL GROUP | Facility: MEDICAL CENTER | Age: 35
End: 2025-05-14
Attending: FAMILY MEDICINE
Payer: MEDICAID

## 2025-05-14 VITALS
BODY MASS INDEX: 18.99 KG/M2 | HEIGHT: 65 IN | TEMPERATURE: 97.2 F | SYSTOLIC BLOOD PRESSURE: 100 MMHG | RESPIRATION RATE: 16 BRPM | WEIGHT: 114 LBS | DIASTOLIC BLOOD PRESSURE: 60 MMHG | HEART RATE: 89 BPM | OXYGEN SATURATION: 98 %

## 2025-05-14 DIAGNOSIS — G47.9 DIFFICULTY SLEEPING: ICD-10-CM

## 2025-05-14 DIAGNOSIS — R53.83 OTHER FATIGUE: Primary | ICD-10-CM

## 2025-05-14 PROBLEM — Z76.89 ENCOUNTER TO ESTABLISH CARE: Status: RESOLVED | Noted: 2023-04-26 | Resolved: 2025-05-14

## 2025-05-14 PROBLEM — J96.00 ACUTE RESPIRATORY FAILURE (HCC): Status: RESOLVED | Noted: 2024-01-17 | Resolved: 2025-05-14

## 2025-05-14 PROBLEM — R39.9 UTI SYMPTOMS: Status: RESOLVED | Noted: 2023-04-26 | Resolved: 2025-05-14

## 2025-05-14 PROBLEM — N89.8 VAGINAL ITCHING: Status: RESOLVED | Noted: 2024-02-05 | Resolved: 2025-05-14

## 2025-05-14 PROCEDURE — 3078F DIAST BP <80 MM HG: CPT | Performed by: FAMILY MEDICINE

## 2025-05-14 PROCEDURE — 99213 OFFICE O/P EST LOW 20 MIN: CPT | Performed by: FAMILY MEDICINE

## 2025-05-14 PROCEDURE — 99214 OFFICE O/P EST MOD 30 MIN: CPT | Performed by: FAMILY MEDICINE

## 2025-05-14 PROCEDURE — 3074F SYST BP LT 130 MM HG: CPT | Performed by: FAMILY MEDICINE

## 2025-05-14 ASSESSMENT — PATIENT HEALTH QUESTIONNAIRE - PHQ9: CLINICAL INTERPRETATION OF PHQ2 SCORE: 0

## 2025-05-14 ASSESSMENT — FIBROSIS 4 INDEX: FIB4 SCORE: 1.73

## 2025-05-15 NOTE — PROGRESS NOTES
"Verbal consent was acquired by the patient to use [employer] ambient listening note generation during this visit   Subjective:     HPI:   History of Present Illness  The patient presents for evaluation of hormone testing.    She underwent a total laparoscopic hysterectomy in 01/2025 and is currently seeking a hormone test to assess her hormonal balance. She is considering the initiation of DIM supplement, estrogen blocking, thyroid support complex, DHEA, and respiratory supplements. She has been on birth control for the past 12 years and is contemplating the potential impact of these supplements on her hormonal balance. She expresses concern about the possibility of excessive estrogen production due to prolonged use of birth control. She has always perceived her hormones to be imbalanced as she has family history of this and is curious about the changes in her body post-hysterectomy. She is also interested in understanding the functioning of her thyroid. She has a family history of hormonal issues, with her mother having undergone a partial hysterectomy. She recently had hysterectomy. Ovaries remain intact    She reports experiencing high levels of anxiety, sleep disturbances, fatigue, and lack of motivation. She also experiences daily headaches and intense migraines, which have previously contraindicated the use of birth control. Despite this, she continued birth control until the night before her surgery due to menstrual irregularities. Over the last 12 years she has not had any breaks in birth control and is unsure of regularity of cycles.        Objective:     Exam:  /60 (BP Location: Left arm, Patient Position: Sitting, BP Cuff Size: Adult)   Pulse 89   Temp 36.2 °C (97.2 °F) (Temporal)   Resp 16   Ht 1.651 m (5' 5\")   Wt 51.7 kg (114 lb)   LMP 12/12/2024   SpO2 98%   BMI 18.97 kg/m²  Body mass index is 18.97 kg/m².    Constitutional: Alert, no distress, well-groomed.  Respiratory: Unlabored " "respiratory effort, no cough.  MSK: moves all extremities.  Psych: normal affect and mood.    Data:   Reviewed hysterectomy notes and f/u from specialist    Assessment & Plan:     1. Other fatigue  FSH/LH    ESTROGENS FRACTIONATED    TSH WITH REFLEX TO FT4      2. Difficulty sleeping  FSH/LH    ESTROGENS FRACTIONATED    TSH WITH REFLEX TO FT4          Assessment & Plan  1. Concern for hormone imbalance. Reports high anxiety, trouble sleeping, fatigue, and lack of motivation. Discussed the importance of hormones for bone health and the potential challenges in interpreting hormone tests due to their fluctuating nature, would not recommend \"hormone blocking\" supplements. Birth control does not significantly impact hormone levels compared to the body's natural production. She is concerned about hyperestrogenism but her symptoms do not correlate with this. Discussed difficulty in interpreting \"hormone labs\".   - Fasting lab test ordered to evaluate FSH, LH, thyroid, and estrogen levels, r/o primary ovarian insufficiency  - Results will be communicated via [employer] message.  - Advised to schedule a follow-up appointment with Mercedes for a comprehensive evaluation if all results are within normal limits.              Return if symptoms worsen or fail to improve.      Please note that this dictation was created using voice recognition software. I have made every reasonable attempt to correct obvious errors, but I expect that there are errors of grammar and possibly content that I did not discover before finalizing the note.        "

## 2025-05-30 ENCOUNTER — HOSPITAL ENCOUNTER (OUTPATIENT)
Facility: MEDICAL CENTER | Age: 35
End: 2025-05-30
Attending: FAMILY MEDICINE
Payer: MEDICAID

## 2025-05-30 ENCOUNTER — OFFICE VISIT (OUTPATIENT)
Dept: MEDICAL GROUP | Facility: MEDICAL CENTER | Age: 35
End: 2025-05-30
Attending: FAMILY MEDICINE
Payer: MEDICAID

## 2025-05-30 VITALS
HEART RATE: 76 BPM | DIASTOLIC BLOOD PRESSURE: 72 MMHG | OXYGEN SATURATION: 97 % | BODY MASS INDEX: 18.83 KG/M2 | TEMPERATURE: 98 F | WEIGHT: 113 LBS | HEIGHT: 65 IN | RESPIRATION RATE: 16 BRPM | SYSTOLIC BLOOD PRESSURE: 110 MMHG

## 2025-05-30 DIAGNOSIS — N89.8 VAGINAL ITCHING: Primary | ICD-10-CM

## 2025-05-30 DIAGNOSIS — N89.8 VAGINAL ITCHING: ICD-10-CM

## 2025-05-30 LAB — AMBIGUOUS DTTM AMBI4: NORMAL

## 2025-05-30 PROCEDURE — 87480 CANDIDA DNA DIR PROBE: CPT

## 2025-05-30 PROCEDURE — 99212 OFFICE O/P EST SF 10 MIN: CPT | Performed by: FAMILY MEDICINE

## 2025-05-30 PROCEDURE — 87510 GARDNER VAG DNA DIR PROBE: CPT

## 2025-05-30 PROCEDURE — 87660 TRICHOMONAS VAGIN DIR PROBE: CPT

## 2025-05-30 RX ORDER — FLUCONAZOLE 150 MG/1
150 TABLET ORAL DAILY
Qty: 1 TABLET | Refills: 0 | Status: SHIPPED | OUTPATIENT
Start: 2025-05-30

## 2025-05-30 ASSESSMENT — FIBROSIS 4 INDEX: FIB4 SCORE: 1.73

## 2025-05-31 NOTE — PROGRESS NOTES
"Verbal consent was acquired by the patient to use [employer] ambient listening note generation during this visit   Subjective:     HPI:   History of Present Illness  The patient presents for evaluation of a yeast infection.    Vaginal itching  She reports experiencing a yeast infection, a condition she has encountered multiple times in her life. She differentiates this from bacterial vaginosis due to the presence of itching, a symptom she associates with yeast infections, and the absence of an unusual odor. She also reports external inflammation but no rash. Itching occurs during urination, but she does not believe she has a urinary tract infection, although she has had concurrent UTIs and yeast infections in the past. She is uncertain about the presence of discharge due to the use of Monistat but notes that there is no odor.    She has been self-medicating with Diflucan, having taken two doses on 05/26/2025 and 05/28/2025, but reports minimal relief. She has also been using Monistat for four days, which has slightly alleviated her symptoms. She has previously found relief from a more intensive course of Diflucan, but is unsure of the efficacy of Monistat as she is uncertain about its expiration date. She has not attempted any other treatments.    She has a history of frequent yeast infections, particularly when taking antibiotics or experiencing UTIs. She also reports sensitivity to certain soaps, detergents, and foods, which can trigger yeast infections. There is no possibility of pregnancy as she has undergone a partial hysterectomy.        Objective:     Exam:  /72 (BP Location: Left arm, Patient Position: Sitting, BP Cuff Size: Adult)   Pulse 76   Temp 36.7 °C (98 °F) (Temporal)   Resp 16   Ht 1.651 m (5' 5\")   Wt 51.3 kg (113 lb)   LMP 12/12/2024   SpO2 97%   BMI 18.80 kg/m²  Body mass index is 18.8 kg/m².    Constitutional: Alert, no distress, well-groomed.  Respiratory: Unlabored respiratory " effort, no cough.  MSK: moves all extremities.  Psych: normal affect and mood.      Data:   None recent    Assessment & Plan:     1. Vaginal itching  VAGINAL PATHOGENS DNA PANEL    fluconazole (DIFLUCAN) 150 MG tablet          Assessment & Plan  1. Vaginal itching  - Persistent itching and inflammation despite taking two doses of Diflucan and using Monistat for four days.  - Swab will be obtained today - may have false negative due to recent treatments. Will treat for 3rd dose for a complicated vaginal yeast infection. Pause all treatment, and if no improvement pt to come back for repeat swab in 1 week - she can mychart msg me to set this up.   - if itching persists and f/u swab is neg, will recommend a topical hydrocortisone for the itching.    Follow-up  - Return for another swab if symptoms persist.          Return if symptoms worsen or fail to improve.      Please note that this dictation was created using voice recognition software. I have made every reasonable attempt to correct obvious errors, but I expect that there are errors of grammar and possibly content that I did not discover before finalizing the note.

## 2025-06-02 ENCOUNTER — RESULTS FOLLOW-UP (OUTPATIENT)
Dept: MEDICAL GROUP | Facility: MEDICAL CENTER | Age: 35
End: 2025-06-02
Payer: MEDICAID

## 2025-06-02 DIAGNOSIS — N89.8 VAGINAL ITCHING: ICD-10-CM

## 2025-06-02 RX ORDER — FLUCONAZOLE 150 MG/1
150 TABLET ORAL DAILY
Qty: 1 TABLET | Refills: 0 | Status: SHIPPED | OUTPATIENT
Start: 2025-06-02

## 2025-06-03 ENCOUNTER — TELEPHONE (OUTPATIENT)
Dept: MEDICAL GROUP | Facility: MEDICAL CENTER | Age: 35
End: 2025-06-03
Payer: MEDICAID

## 2025-06-03 NOTE — TELEPHONE ENCOUNTER
Pt mention her    Disp Refills Start End   fluconazole (DIFLUCAN) 150 MG tablet       Was sent here at the [facility] and she want it at [facility].

## 2025-07-15 DIAGNOSIS — Z00.6 CLINICAL TRIAL PARTICIPANT: ICD-10-CM

## 2025-08-12 ENCOUNTER — HOSPITAL ENCOUNTER (OUTPATIENT)
Dept: LAB | Facility: MEDICAL CENTER | Age: 35
End: 2025-08-12
Attending: FAMILY MEDICINE
Payer: MEDICAID

## 2025-08-12 DIAGNOSIS — Z00.6 CLINICAL TRIAL PARTICIPANT: ICD-10-CM

## 2025-08-24 LAB
APOB+LDLR+PCSK9 GENE MUT ANL BLD/T: NOT DETECTED
BRCA1+BRCA2 DEL+DUP + FULL MUT ANL BLD/T: NOT DETECTED
MLH1+MSH2+MSH6+PMS2 GN DEL+DUP+FUL M: NOT DETECTED

## 2025-08-25 ENCOUNTER — RESULTS FOLLOW-UP (OUTPATIENT)
Dept: MEDICAL GROUP | Facility: PHYSICIAN GROUP | Age: 35
End: 2025-08-25
Payer: MEDICAID

## (undated) DEVICE — BLADE SURGICAL #10 - (50/BX)

## (undated) DEVICE — WATER IRRIGATION STERILE 1000ML (12EA/CA)

## (undated) DEVICE — CANISTER SUCTION 3000ML MECHANICAL FILTER AUTO SHUTOFF MEDI-VAC NONSTERILE LF DISP (40EA/CA)

## (undated) DEVICE — TOWEL STOP TIMEOUT SAFETY FLAG (40EA/CA)

## (undated) DEVICE — SENSOR OXIMETER ADULT SPO2 RD SET (20EA/BX)

## (undated) DEVICE — CHLORAPREP 26 ML APPLICATOR - ORANGE TINT(25/CA)

## (undated) DEVICE — SET SUCTION/IRRIGATION WITH DISPOSABLE TIP (6/CA )PART #0250-070-520 IS A SUB

## (undated) DEVICE — PAD BABY LAP 4X18 W/O - RINGS PREWASHED 5/PK 40PK/CS

## (undated) DEVICE — TROCAR 5X100 SEPARTATOR ADV - FIXATION (6/BX)

## (undated) DEVICE — SET TUBING PNEUMOCLEAR HIGH FLOW SMOKE EVACUATION (10EA/BX)

## (undated) DEVICE — TROCAR 5X100 BLADED ADVANCE - FIXATION (6/BX)

## (undated) DEVICE — GLOVE BIOGEL INDICATOR SZ 6.5 SURGICAL PF LTX - (50PR/BX 4BX/CA)

## (undated) DEVICE — DERMABOND ADVANCED - (12EA/BX)

## (undated) DEVICE — GLOVE SZ 6.5 BIOGEL PI MICRO - PF LF (50PR/BX)

## (undated) DEVICE — CANNULA O2 COMFORT SOFT EAR ADULT 7 FT TUBING (50/CA)

## (undated) DEVICE — SET LEADWIRE 5 LEAD BEDSIDE DISPOSABLE ECG (1SET OF 5/EA)

## (undated) DEVICE — SPONGE GAUZESTER 4 X 4 4PLY - (128PK/CA)

## (undated) DEVICE — PAD SANITARY 11IN MAXI IND WRAPPED (12EA/PK 24PK/CA)

## (undated) DEVICE — SUTURE 0 COATED VICRYL 6-18IN - (12PK/BX)

## (undated) DEVICE — PACK MINOR ROSEVIEW - (7EA/CA)

## (undated) DEVICE — MASK OXYGEN VNYL ADLT MED CONC WITH 7 FOOT TUBING - (50EA/CA)

## (undated) DEVICE — PACK MAJOR BASIN - (3EA/CA)

## (undated) DEVICE — SODIUM CHL IRRIGATION 0.9% 1000ML (12EA/CA)

## (undated) DEVICE — Device

## (undated) DEVICE — LACTATED RINGERS INJ 1000 ML - (14EA/CA 60CA/PF)

## (undated) DEVICE — DRAPE UNDER BUTTOCKS FLUID - (20/CA)

## (undated) DEVICE — TRAY SRGPRP PVP IOD WT PRP - (20/CA)

## (undated) DEVICE — GOWN WARMING STANDARD FLEX - (30/CA)

## (undated) DEVICE — GLOVE BIOGEL PI INDICATOR SZ 7.5 SURGICAL PF LF -(50/BX 4BX/CA)

## (undated) DEVICE — SLEEVE VASO DVT COMPRESSION CALF MED - (10PR/CA)

## (undated) DEVICE — GLOVE BIOGEL SZ 6.5 SURGICAL PF LTX (50PR/BX 4BX/CA)

## (undated) DEVICE — TUBE CONNECTING SUCTION - CLEAR PLASTIC STERILE 72 IN (50EA/CA)

## (undated) DEVICE — TUBING CLEARLINK DUO-VENT - C-FLO (48EA/CA)

## (undated) DEVICE — CANISTER SUCTION RIGID RED 1500CC (40EA/CA)

## (undated) DEVICE — DRESSING NON-ADHERING 8 X 3 - (50/BX)

## (undated) DEVICE — KIT  I.V. START (100EA/CA)

## (undated) DEVICE — ELECTRODE DUAL RETURN W/ CORD - (50/PK)

## (undated) DEVICE — NEEDLE INSFL 120MM 14GA VRRS - (20/BX)

## (undated) DEVICE — SUTURE GENERAL

## (undated) DEVICE — SYSTEM CLEARIFY VISUALIZATION (10EA/PK)

## (undated) DEVICE — TRAY FOLEY CATHETER STATLOCK 16FR SURESTEP (10EA/CA)

## (undated) DEVICE — SUTURE 0 VICRYL PLUS CT-1 - 36 INCH (36/BX)

## (undated) DEVICE — LIGASURE LAPAROSCOPIC 5MM - (6EA/CA)

## (undated) DEVICE — SUCTION INSTRUMENT YANKAUER BULBOUS TIP W/O VENT (50EA/CA)

## (undated) DEVICE — MANIPULATOR UTERINE VCARE PLUS CEVICAL CUP SMALL 32MM (1EA)

## (undated) DEVICE — GLOVE BIOGEL PI INDICATOR SZ 7.0 SURGICAL PF LF - (50/BX 4BX/CA)

## (undated) DEVICE — SUTURE 0 VICRYL PLUS CT-1 - 8 X 18 INCH (12/BX)

## (undated) DEVICE — GLOVE SZ 6 BIOGEL PI MICRO - PF LF (50PR/BX 4BX/CA)

## (undated) DEVICE — SUTURE 1 6-18IN COATED VICRYL - PLUS VIO TIES(12PK/BX)

## (undated) DEVICE — DRAPESURG STERI-DRAPE LONG - (10/BX 4BX/CA)

## (undated) DEVICE — RETRACTOR O C SECTION LRY - (5/BX)

## (undated) DEVICE — ELECTRODE 5MM LHK LAPSCP STERILE DISP- MEGADYNE (5/CA)

## (undated) DEVICE — SET IRRIGATION CYSTOSCOPY TUBE L80 IN (20EA/CA)

## (undated) DEVICE — SUTURE 4-0 MONOCRYL PLUS PS-2 - 27 INCH (36/BX)